# Patient Record
Sex: MALE | Race: BLACK OR AFRICAN AMERICAN | NOT HISPANIC OR LATINO
[De-identification: names, ages, dates, MRNs, and addresses within clinical notes are randomized per-mention and may not be internally consistent; named-entity substitution may affect disease eponyms.]

---

## 2021-04-09 PROBLEM — Z00.00 ENCOUNTER FOR PREVENTIVE HEALTH EXAMINATION: Status: ACTIVE | Noted: 2021-04-09

## 2021-04-12 ENCOUNTER — APPOINTMENT (OUTPATIENT)
Dept: HEMATOLOGY ONCOLOGY | Facility: CLINIC | Age: 66
End: 2021-04-12
Payer: MEDICAID

## 2021-04-12 VITALS
RESPIRATION RATE: 18 BRPM | SYSTOLIC BLOOD PRESSURE: 115 MMHG | WEIGHT: 132 LBS | DIASTOLIC BLOOD PRESSURE: 78 MMHG | HEART RATE: 91 BPM | BODY MASS INDEX: 18.9 KG/M2 | TEMPERATURE: 98.4 F | OXYGEN SATURATION: 99 % | HEIGHT: 70 IN

## 2021-04-12 PROCEDURE — 99205 OFFICE O/P NEW HI 60 MIN: CPT

## 2021-04-12 PROCEDURE — 99072 ADDL SUPL MATRL&STAF TM PHE: CPT

## 2021-04-12 NOTE — ASSESSMENT
[FreeTextEntry1] : atient is 65yr M with known gastric cancer with liver mets, here for further management. Details of therapy are obtained from pt's EMR. \par \par States he was diagnosed in 2020 during a routine endoscopy/colonoscopy. He completed 8 cycles of FLOT chemotherapy 4/16/20=8/12/20  then had total gastrectomy with esophagojejunostomy 9/20 and completed post-op chemo(Xeloda)RT 12/20 at [facility]. \par \par Surveillance CT AP on 2/3/21 showed interval development of left hepatic vein thrombosis as well as a large geographic region of hypodensity throughout the left hepatic lobe measuring 8.4x7.2x7.8 cm . He underwent liver biopsy on 3/23/21, path confirmed moderately differentiated adenocarcinoma, positive for CK7, negative for CK20, CDX-2 , Hepar-1.  His oncologist at [facility] is suggesting he has Immunotherapy. He has sought second opinion from [facility] and is now here for a third opinion.\par \par 2/3/21 CT CAP At [facility]\par 1.Interval development of left hepatic vein thrombosis as well as a large geographic region of hypodensity throughout the left hepatic lobe measuring 8.4x7.2x7.8cm. Finding could represent a perfusional anomaly related to left hepatic vein occlusion. However, a large metastatic lesion with associated left hepatic vein thrombosis is not excluded. Additionally, there is a 1.3am hypodensity in hepatic seg 6/7, which is also new and raises concern for a small metastatic focus. \par 2.Stable postsurgical changes of total gastrectomy and esophagojejunostomy and jejunojejunostomy , jejunostomy tube in place. No evidence of recurrence. No bowel obstruction. \par 3. No pulmonary nodule, focal consolidation, pleural effusion, or pneumothorax. No thoracic lymphadenopathy. \par 4. No abdominopelvic lymphadenopathy. \par \par 2/24/21 MRI Abdomen\par 1.Gastric adenocarcinoma s/p total gastrectomy with postsurgical appearance. No suspicious nodularity/enhancement at the surgical sites to suggest local recurrence. \par 2. Since the prior to CT dated 11/3/20 there has been interval increased atrophy of the left lateral segment, with  lack of uptake notes on hepatobiliary phase imaging  in a geographic distribution, suggestive of post radiation treatment changes. There is associated diminished caliber of the left portal vein and occluded left hepatic vein. Findings are most likely related to post radiation treatment changes of the left lateral segment. Infiltrative metastasis is considered much less likely. Recommend a 3 months f/u\par 3.Interval development of a new discrete 2 cm segment 6/7 hepatic lesion since the prior to CT AP dated 11/3/20 suspicious for viable hepatic metastasis. Additional indeterminate 0.3cm segment 5 hepatic lesion for which hepatic metastasis also remains on the differential diagnosis. \par \par Plan\par # Gastric cancer with liver mets, s/p C8 FLOT, Xeloda/RT\par -Obtain oncology notes\par -Obtain PDL-1, NGS reports\par - SW f/u\par -Further recommendations pending these results\par -Nutrition referral\par \par RTC next week

## 2021-04-12 NOTE — HISTORY OF PRESENT ILLNESS
[de-identified] : Patient is 65yr M with known gastric cancer with liver mets, here for an opinion. Details of therapy are obtained from pt's EMR. \par \par States he was diagnosed in 2020 during a routine endoscopy/colonoscopy. He completed 8 cycles of FLOT chemotherapy 4/16/20-8/12/20  then had total gastrectomy with esophagojejunostomy 9/20 and completed post-op chemo(Xeloda)RT 12/20 at [facility]. Surveillance CT AP on 2/3/21 showed interval development of left hepatic vein thrombosis as well as a large geographic region of hypodensity throughout the left hepatic lobe measuring 8.4x7.2x7.8 cm . He underwent liver biopsy on 3/23/21, path confirmed moderately differentiated adenocarcinoma, positive for CK7, negative for CK20, CDX-2 , Hepar-1.  His oncologist at [facility] is suggesting he has Immunotherapy. He has sought second opinion from [facility] and is now here for a third opinion. He is reluctant to offer in depth information regarding Phx.\par \par 2/3/21 CT CAP At [facility]\par 1.Interval development of left hepatic vein thrombosis as well as a large geographic region of hypodensity throughout the left hepatic lobe measuring 8.4x7.2x7.8cm. Finding could represent a perfusional anomaly related to left hepatic vein occlusion. However, a large metastatic lesion with associated left hepatic vein thrombosis is not excluded. Additionally, there is a 1.3am hypodensity in hepatic seg 6/7, which is also new and raises concern for a small metastatic focus. \par 2.Stable postsurgical changes of total gastrectomy and esophagojejunostomy and jejunojejunostomy , jejunostomy tube in place. No evidence of recurrence. No bowel obstruction. \par 3. No pulmonary nodule, focal consolidation, pleural effusion, or pneumothorax. No thoracic lymphadenopathy. \par 4. No abdominopelvic lymphadenopathy. \par \par 2/24/21 MRI Abdomen\par 1.Gastric adenocarcinoma s/p total gastrectomy with postsurgical appearance. No suspicious nodularity/enhancement at the surgical sites to suggest local recurrence. \par 2. Since the prior to CT dated 11/3/20 there has been interval increased atrophy of the left lateral segment, with  lack of uptake notes on hepatobiliary phase imaging  in a geographic distribution, suggestive of post radiation treatment changes. There is associated diminished caliber of the left portal vein and occluded left hepatic vein. Findings are most likely related to post radiation treatment changes of the left lateral segment. Infiltrative metastasis is considered much less likely. Recommend a 3 months f/u\par 3.Interval development of a new discrete 2 cm segment 6/7 hepatic lesion since the prior to CT AP dated 11/3/20 suspicious for viable hepatic metastasis. Additional indeterminate 0.3cm segment 5 hepatic lesion for which hepatic metastasis also remains on the differential diagnosis.  [de-identified] : Patient notes numbness/tingling to fingers and recent weight loss, denies all other symptoms at this time.

## 2021-04-12 NOTE — REVIEW OF SYSTEMS
[Recent Change In Weight] : ~T recent weight change [Fever] : no fever [Eye Pain] : no eye pain [Dysphagia] : no dysphagia [Chest Pain] : no chest pain [Lower Ext Edema] : no lower extremity edema [Shortness Of Breath] : no shortness of breath [Wheezing] : no wheezing [Cough] : no cough [Abdominal Pain] : no abdominal pain [Vomiting] : no vomiting [Constipation] : no constipation [Diarrhea] : no diarrhea [FreeTextEntry2] : when asked how much weight loss, pt states "a lot" [de-identified] : tingling to fingers

## 2021-04-12 NOTE — PHYSICAL EXAM
[Restricted in physically strenuous activity but ambulatory and able to carry out work of a light or sedentary nature] : Status 1- Restricted in physically strenuous activity but ambulatory and able to carry out work of a light or sedentary nature, e.g., light house work, office work [Normal] : affect appropriate [de-identified] : g- tube in place

## 2021-04-12 NOTE — REASON FOR VISIT
[Initial Consultation] : an initial consultation [FreeTextEntry1] : [id number] [FreeTextEntry2] : Ariela [TWNoteComboBox1] : Uz[place]

## 2021-04-20 ENCOUNTER — APPOINTMENT (OUTPATIENT)
Dept: HEMATOLOGY ONCOLOGY | Facility: CLINIC | Age: 66
End: 2021-04-20
Payer: MEDICAID

## 2021-04-21 ENCOUNTER — APPOINTMENT (OUTPATIENT)
Dept: HEMATOLOGY ONCOLOGY | Facility: CLINIC | Age: 66
End: 2021-04-21
Payer: MEDICAID

## 2021-04-21 VITALS
HEART RATE: 119 BPM | BODY MASS INDEX: 18.9 KG/M2 | SYSTOLIC BLOOD PRESSURE: 93 MMHG | WEIGHT: 132 LBS | TEMPERATURE: 99.5 F | OXYGEN SATURATION: 99 % | DIASTOLIC BLOOD PRESSURE: 67 MMHG | RESPIRATION RATE: 18 BRPM | HEIGHT: 70 IN

## 2021-04-21 DIAGNOSIS — G47.00 INSOMNIA, UNSPECIFIED: ICD-10-CM

## 2021-04-21 PROCEDURE — 99214 OFFICE O/P EST MOD 30 MIN: CPT

## 2021-04-21 PROCEDURE — 99072 ADDL SUPL MATRL&STAF TM PHE: CPT

## 2021-04-28 ENCOUNTER — NON-APPOINTMENT (OUTPATIENT)
Age: 66
End: 2021-04-28

## 2021-05-04 ENCOUNTER — APPOINTMENT (OUTPATIENT)
Dept: HEMATOLOGY ONCOLOGY | Facility: CLINIC | Age: 66
End: 2021-05-04
Payer: MEDICAID

## 2021-05-04 VITALS
WEIGHT: 130 LBS | HEIGHT: 70 IN | BODY MASS INDEX: 18.61 KG/M2 | TEMPERATURE: 98.2 F | HEART RATE: 69 BPM | DIASTOLIC BLOOD PRESSURE: 67 MMHG | RESPIRATION RATE: 18 BRPM | SYSTOLIC BLOOD PRESSURE: 105 MMHG | OXYGEN SATURATION: 100 %

## 2021-05-04 DIAGNOSIS — Z01.812 ENCOUNTER FOR PREPROCEDURAL LABORATORY EXAMINATION: ICD-10-CM

## 2021-05-04 PROCEDURE — 99214 OFFICE O/P EST MOD 30 MIN: CPT

## 2021-05-04 PROCEDURE — 99072 ADDL SUPL MATRL&STAF TM PHE: CPT

## 2021-05-04 RX ORDER — GABAPENTIN 100 MG/1
CAPSULE ORAL
Refills: 0 | Status: COMPLETED | COMMUNITY
End: 2021-05-04

## 2021-05-04 NOTE — HISTORY OF PRESENT ILLNESS
[de-identified] : Mr. Murry is 65yr M with Stage III(ypT4b,pN2,cMo AJCC 8th ed) poorly  differentiated gastric cancer of the body and antrum with  MSI-H, Her2 -ve, KRAS WT, PD- L1 expression with CPS 5 , s/p 8 cycles of FLOT (4/16/20-8/12/20), Total gastrectomy with esophagojejunostomy with enblock resection of segment 2/3, periportal and D2 lymphadenectomy, feeding jejunostomy, complete omentectomy,oversewing of duodenum (9/14/20), (Xeloda)RT(12/14/20). Now with recurrent dz,isolated biopsy proven liver met segment 6. Started C1 Pembrolizumab with Dr. Mendoza at [place] on 4/20/21. Today here for f/u after Pembrolizumab and wants to transfer his care to us. \par \par He was diagnosed with  Stage III(ypT4b,pN2,cMo) poorly  differentiated gastric cancer of the body and antrum, Her2 negative. He completed 8 cycles of FLOT chemotherapy 4/16/20-8/12/20  then had total gastrectomy with esophagojejunostomy with enblock resection of segment 2/3, periportal and D2 lymphadenectomy, feeding jejunostomy, complete omentectomy,oversewing of duodenum on 9/14/20 and completed post-op chemo(Xeloda)RT 12/14/20 at [facility]. Surveillance CT AP on 2/3/21 showed interval development of left hepatic vein thrombosis as well as a large geographic region of hypodensity throughout the left hepatic lobe measuring 8.4x7.2x7.8 cm . He underwent liver biopsy on 3/23/21, path confirmed moderately differentiated adenocarcinoma, positive for CK7, negative for CK20, CDX-2 , Hepar-1.  His oncologist at [facility] is suggesting he has Immunotherapy. He has sought second opinion from [facility] and is now here for a third opinion. He is reluctant to offer in depth information regarding Phx.\par \par 9/14/20\par surgical pathology report\par A. Omentum, Omentectomy: Benign omental fibroadipose tissue with chronic inflammation and reactive mesothelium\par B Lymph node, common bile duct , excision: One benign lymph node(0/1)\par C. Lymph node, Hepatic artery , excision: Metastatic adenocarcinoma involving three of three lymph node(3/3)\par D. Lymph node, portal vein excision: One benign lymph node(0/1)\par E. Stomach and Liber, Total gastrectomy and partial hepatectomy \par - Invasive poorly differentiated adenocarcinoma, measuring 8.0cm, predominantly involving gastric body and antrum\par -Tumor invades through entire thickness of gastric wall and into liver parenchyma. \par -All surgical margins( proximal ,distal, radial and liver resection margins) are free of tumor.\par - Focal lymphovascular space invasion present \par - No peripheral invasion seen\par -Metastatic adenocarcinoma involving one of twenty five lymph nodes(1/25)\par -Metastatic focus in lymph node measures 2.2cm\par -Background stomach shows intestinal metaplasia\par -AJCC 8th ed pathologic tumor stage: [id number]\par F.Lymph node, left gastric , excision:  One benign lymph node(0/1)\par G .Lymph node, Infrapancreatic, excision: Metastatic adenocarcinoma involving one of one lymph node(1/1) \par \par 2/3/21 CT CAP At [facility]\par 1.Interval development of left hepatic vein thrombosis as well as a large geographic region of hypodensity throughout the left hepatic lobe measuring 8.4x7.2x7.8cm. Finding could represent a perfusional anomaly related to left hepatic vein occlusion. However, a large metastatic lesion with associated left hepatic vein thrombosis is not excluded. Additionally, there is a 1.3am hypodensity in hepatic seg 6/7, which is also new and raises concern for a small metastatic focus. \par 2.Stable postsurgical changes of total gastrectomy and esophagojejunostomy and jejunojejunostomy , jejunostomy tube in place. No evidence of recurrence. No bowel obstruction. \par 3. No pulmonary nodule, focal consolidation, pleural effusion, or pneumothorax. No thoracic lymphadenopathy. \par 4. No abdominopelvic lymphadenopathy. \par \par 2/24/21 MRI Abdomen\par 1.Gastric adenocarcinoma s/p total gastrectomy with postsurgical appearance. No suspicious nodularity/enhancement at the surgical sites to suggest local recurrence. \par 2. Since the prior to CT dated 11/3/20 there has been interval increased atrophy of the left lateral segment, with  lack of uptake notes on hepatobiliary phase imaging  in a geographic distribution, suggestive of post radiation treatment changes. There is associated diminished caliber of the left portal vein and occluded left hepatic vein. Findings are most likely related to post radiation treatment changes of the left lateral segment. Infiltrative metastasis is considered much less likely. Recommend a 3 months f/u\par 3.Interval development of a new discrete 2 cm segment 6/7 hepatic lesion since the prior to CT AP dated 11/3/20 suspicious for viable hepatic metastasis. Additional indeterminate 0.3cm segment 5 hepatic lesion for which hepatic metastasis also remains on the differential diagnosis. \par \par 3/23/21\par CT guided liver biopsy\par -Adenocarcinoma, moderately-poorly differentiated \par positive for CK7, negative for CK20, CDX-2 Hepar-1\par This pattern of staining supports the diagnosis, but is not specific in regards to site of origin. Given  patient's history of gastric cancer, this morphology and immunophenotype would be compatible with a metastasis from that site.   [de-identified] : Patient returns for follow-up visit, and to further establish care, he is set to resume chemo treatments with us here next week. He notes constipation and fatigue, as well as continued neuropathy of the hands/feet from prior chemo treatments. He continues taking Gabapentin, he can not remember the dosage amount. PEG tube is in place, patient has been using QHS with Vital (3-4 cans via kangaroo pump x 8hr), ordered by [facility].

## 2021-05-04 NOTE — PHYSICAL EXAM
[Restricted in physically strenuous activity but ambulatory and able to carry out work of a light or sedentary nature] : Status 1- Restricted in physically strenuous activity but ambulatory and able to carry out work of a light or sedentary nature, e.g., light house work, office work [Normal] : affect appropriate [Thin] : thin [de-identified] : g- tube in place

## 2021-05-04 NOTE — ASSESSMENT
[FreeTextEntry1] : Mr. Murry is 65yr M with Stage III(ypT4b,pN2,cMo AJCC 8th ed) poorly  differentiated gastric cancer of the body and antrum with MSI-H, Her2 -ve, KRAS WT, PD- L1 expression with CPS 5, s/p 8 cycles of FLOT (4/16/20-8/12/20), Total gastrectomy with esophagojejunostomy with enblock resection of segment 2/3, periportal and D2 lymphadenectomy, feeding jejunostomy, complete omentectomy,oversewing of duodenum (9/14/20), (Xeloda)RT(12/14/20). Now with recurrent dz,isolated biopsy proven liver met segment 6. Started C1 Pembrolizumab with Dr. Mendoza at [place] on 4/20/21. Today here for f/u after Pembrolizumab and wants to transfer his care to us. . \par \par 9/14/20\par surgical pathology report\par A. Omentum, Omentectomy: Benign omental fibroadipose tissue with chronic inflammation and reactive mesothelium\par B Lymph node, common bile duct , excision: One benign lymph node(0/1)\par C. Lymph node, Hepatic artery , excision: Metastatic adenocarcinoma involving three of three lymph node(3/3)\par D. Lymph node, portal vein excision: One benign lymph node(0/1)\par E. Stomach and Liber, Total gastrectomy and partial hepatectomy \par - Invasive poorly differentiated adenocarcinoma, measuring 8.0cm, predominantly involving gastric body and antrum\par -Tumor invades through entire thickness of gastric wall and into liver parenchyma. \par -All surgical margins( proximal ,distal, radial and liver resection margins) are free of tumor.\par - Focal lymphovascular space invasion present \par - No peripheral invasion seen\par -Metastatic adenocarcinoma involving one of twenty five lymph nodes(1/25)\par -Metastatic focus in lymph node measures 2.2cm\par -Background stomach shows intestinal metaplasia\par -AJCC 8th ed pathologic tumor stage: [id number]\par F.Lymph node, left gastric , excision:  One benign lymph node(0/1)\par G .Lymph node, Infrapancreatic, excision: Metastatic adenocarcinoma involving one of one lymph node(1/1) \par \par 2/3/21 CT CAP At [facility]\par 1.Interval development of left hepatic vein thrombosis as well as a large geographic region of hypodensity throughout the left hepatic lobe measuring 8.4x7.2x7.8cm. Finding could represent a perfusional anomaly related to left hepatic vein occlusion. However, a large metastatic lesion with associated left hepatic vein thrombosis is not excluded. Additionally, there is a 1.3am hypodensity in hepatic seg 6/7, which is also new and raises concern for a small metastatic focus. \par 2.Stable postsurgical changes of total gastrectomy and esophagojejunostomy and jejunojejunostomy , jejunostomy tube in place. No evidence of recurrence. No bowel obstruction. \par 3. No pulmonary nodule, focal consolidation, pleural effusion, or pneumothorax. No thoracic lymphadenopathy. \par 4. No abdominopelvic lymphadenopathy. \par \par 2/24/21 MRI Abdomen\par 1.Gastric adenocarcinoma s/p total gastrectomy with postsurgical appearance. No suspicious nodularity/enhancement at the surgical sites to suggest local recurrence. \par 2. Since the prior to CT dated 11/3/20 there has been interval increased atrophy of the left lateral segment, with  lack of uptake notes on hepatobiliary phase imaging  in a geographic distribution, suggestive of post radiation treatment changes. There is associated diminished caliber of the left portal vein and occluded left hepatic vein. Findings are most likely related to post radiation treatment changes of the left lateral segment. Infiltrative metastasis is considered much less likely. Recommend a 3 months f/u\par 3.Interval development of a new discrete 2 cm segment 6/7 hepatic lesion since the prior to CT AP dated 11/3/20 suspicious for viable hepatic metastasis. Additional indeterminate 0.3cm segment 5 hepatic lesion for which hepatic metastasis also remains on the differential diagnosis. \par \par 3/23/21\par CT guided liver biopsy\par -Adenocarcinoma, moderately-poorly differentiated \par positive for CK7, negative for CK20, CDX-2 Hepar-1\par This pattern of staining supports the diagnosis, but is not specific in regards to site of origin. Given  patient's history of gastric cancer, this morphology and immunophenotype would be compatible with a metastasis from that site.  \par \par Plan\par # Stage III(ypT4b,pN2,cMo AJCC 8th ed) MSI-H, Her2 -ve, KRAS-ve, PD- L1 expression with CPS 5  Gastric cancer, now with recurrent dz with liver mets, s/p C8 FLOT, Total gastrectomy with esophagojejunostomy, Xeloda/RT\par -MSI -H Her2 -ve, KRAS-ve, PD- L1 expression with CPS 5 \par -Started C1 Pembrolizumab with Dr. Mendoza at [place] on 4/20/21. He wants to start Pembro with us on 5/11/21. He signed consent. \par - SW f/u\par - Will repeat CT CAP prior to next treatment to serve as baseline\par \par # Weight loss/ Malnutrition/ PEG tube feeding\par -PEG tube feeding QHS with Vital (3-4 cans via kangaroo pump x 8hr), ordered by [facility]. \par -Nutrition referral\par \par # Insomnia\par -Rx for Xanax at bedtime as prn\par \par # Peripheral neuropathy \par - Monitor now\par \par RTC in  2 weeks

## 2021-05-04 NOTE — REVIEW OF SYSTEMS
[Fatigue] : fatigue [Recent Change In Weight] : ~T recent weight change [Muscle Weakness] : muscle weakness [Fever] : no fever [Chills] : no chills [Night Sweats] : no night sweats [Eye Pain] : no eye pain [Dysphagia] : no dysphagia [Odynophagia] : no odynophagia [Chest Pain] : no chest pain [Lower Ext Edema] : no lower extremity edema [Shortness Of Breath] : no shortness of breath [Wheezing] : no wheezing [Cough] : no cough [Abdominal Pain] : no abdominal pain [Vomiting] : no vomiting [Constipation] : no constipation [Diarrhea] : no diarrhea [Dysuria] : no dysuria [FreeTextEntry2] : when asked how much weight loss, pt states "a lot" [de-identified] : tingling to fingers

## 2021-05-05 LAB
25(OH)D3 SERPL-MCNC: 38.2 NG/ML
ALBUMIN SERPL ELPH-MCNC: 4.3 G/DL
ALP BLD-CCNC: 187 U/L
ALT SERPL-CCNC: 32 U/L
ANION GAP SERPL CALC-SCNC: 14 MMOL/L
AST SERPL-CCNC: 30 U/L
BASOPHILS # BLD AUTO: 0.04 K/UL
BASOPHILS NFR BLD AUTO: 1 %
BILIRUB SERPL-MCNC: 0.2 MG/DL
BUN SERPL-MCNC: 14 MG/DL
CALCIUM SERPL-MCNC: 9.3 MG/DL
CANCER AG19-9 SERPL-ACNC: <2 U/ML
CEA SERPL-MCNC: 253 NG/ML
CHLORIDE SERPL-SCNC: 104 MMOL/L
CO2 SERPL-SCNC: 25 MMOL/L
CREAT SERPL-MCNC: 0.67 MG/DL
EOSINOPHIL # BLD AUTO: 0.05 K/UL
EOSINOPHIL NFR BLD AUTO: 1.3 %
GLUCOSE SERPL-MCNC: 81 MG/DL
HCT VFR BLD CALC: 37.1 %
HGB BLD-MCNC: 11.5 G/DL
IMM GRANULOCYTES NFR BLD AUTO: 0.3 %
IRON SATN MFR SERPL: 13 %
IRON SERPL-MCNC: 53 UG/DL
LYMPHOCYTES # BLD AUTO: 0.98 K/UL
LYMPHOCYTES NFR BLD AUTO: 25.4 %
MAN DIFF?: NORMAL
MCHC RBC-ENTMCNC: 27.1 PG
MCHC RBC-ENTMCNC: 31 GM/DL
MCV RBC AUTO: 87.3 FL
MONOCYTES # BLD AUTO: 0.58 K/UL
MONOCYTES NFR BLD AUTO: 15 %
NEUTROPHILS # BLD AUTO: 2.2 K/UL
NEUTROPHILS NFR BLD AUTO: 57 %
PLATELET # BLD AUTO: 260 K/UL
POTASSIUM SERPL-SCNC: 5 MMOL/L
PROT SERPL-MCNC: 7.2 G/DL
RBC # BLD: 4.25 M/UL
RBC # FLD: 16.2 %
SARS-COV-2 N GENE NPH QL NAA+PROBE: NOT DETECTED
SODIUM SERPL-SCNC: 142 MMOL/L
TIBC SERPL-MCNC: 410 UG/DL
UIBC SERPL-MCNC: 357 UG/DL
VIT B12 SERPL-MCNC: >2000 PG/ML
WBC # FLD AUTO: 3.86 K/UL

## 2021-05-06 ENCOUNTER — NON-APPOINTMENT (OUTPATIENT)
Age: 66
End: 2021-05-06

## 2021-05-06 LAB
T4 SERPL-MCNC: 7.6 UG/DL
TSH SERPL-ACNC: 1.19 UIU/ML

## 2021-05-08 ENCOUNTER — OUTPATIENT (OUTPATIENT)
Dept: OUTPATIENT SERVICES | Facility: HOSPITAL | Age: 66
LOS: 1 days | End: 2021-05-08

## 2021-05-08 ENCOUNTER — APPOINTMENT (OUTPATIENT)
Dept: CT IMAGING | Facility: CLINIC | Age: 66
End: 2021-05-08

## 2021-05-11 ENCOUNTER — APPOINTMENT (OUTPATIENT)
Age: 66
End: 2021-05-11

## 2021-05-19 ENCOUNTER — LABORATORY RESULT (OUTPATIENT)
Age: 66
End: 2021-05-19

## 2021-05-19 ENCOUNTER — APPOINTMENT (OUTPATIENT)
Dept: HEMATOLOGY ONCOLOGY | Facility: CLINIC | Age: 66
End: 2021-05-19
Payer: MEDICAID

## 2021-05-19 VITALS
HEART RATE: 98 BPM | DIASTOLIC BLOOD PRESSURE: 71 MMHG | BODY MASS INDEX: 18.9 KG/M2 | WEIGHT: 132 LBS | SYSTOLIC BLOOD PRESSURE: 117 MMHG | HEIGHT: 70 IN | TEMPERATURE: 98 F | OXYGEN SATURATION: 99 % | RESPIRATION RATE: 17 BRPM

## 2021-05-19 PROCEDURE — 99214 OFFICE O/P EST MOD 30 MIN: CPT

## 2021-05-19 RX ORDER — CHLORHEXIDINE GLUCONATE 4 %
325 (65 FE) LIQUID (ML) TOPICAL DAILY
Qty: 30 | Refills: 0 | Status: DISCONTINUED | COMMUNITY
Start: 2021-05-19 | End: 2021-05-19

## 2021-05-21 LAB
ALBUMIN SERPL ELPH-MCNC: 3.4 G/DL
ALP BLD-CCNC: 115 U/L
ALT SERPL-CCNC: 22 U/L
ANION GAP SERPL CALC-SCNC: 7 MMOL/L
AST SERPL-CCNC: 32 U/L
BILIRUB SERPL-MCNC: 0.8 MG/DL
BUN SERPL-MCNC: 15 MG/DL
CALCIUM SERPL-MCNC: 9.5 MG/DL
CANCER AG19-9 SERPL-ACNC: <2 U/ML
CEA SERPL-MCNC: 114 NG/ML
CHLORIDE SERPL-SCNC: 106 MMOL/L
CO2 SERPL-SCNC: 30 MMOL/L
CREAT SERPL-MCNC: 0.7 MG/DL
FERRITIN SERPL-MCNC: 27 NG/ML
GLUCOSE SERPL-MCNC: 92 MG/DL
POTASSIUM SERPL-SCNC: 5 MMOL/L
PROT SERPL-MCNC: 6.7 G/DL
SODIUM SERPL-SCNC: 143 MMOL/L

## 2021-05-21 NOTE — HISTORY OF PRESENT ILLNESS
[de-identified] : Mr. Murry is 65yr M with Stage III(ypT4b,pN2,cMo AJCC 8th ed) poorly  differentiated gastric cancer of the body and antrum with  MSI-H, Her2 -ve, KRAS WT, PD- L1 expression with CPS 5 , s/p 8 cycles of FLOT (4/16/20-8/12/20), Total gastrectomy with esophagojejunostomy with enblock resection of segment 2/3, periportal and D2 lymphadenectomy, feeding jejunostomy, complete omentectomy,oversewing of duodenum (9/14/20), (Xeloda)RT(12/14/20). Now with recurrent dz,isolated biopsy proven liver met segment 6. Started C1 Pembrolizumab with Dr. Mendoza at [place] on 4/20/21. Today here for f/u after Pembrolizumab 5/11/21  and wants to transfer his care to us. \par \par He was diagnosed with  Stage III(ypT4b,pN2,cMo) poorly  differentiated gastric cancer of the body and antrum, Her2 negative. He completed 8 cycles of FLOT chemotherapy 4/16/20-8/12/20  then had total gastrectomy with esophagojejunostomy with enblock resection of segment 2/3, periportal and D2 lymphadenectomy, feeding jejunostomy, complete omentectomy,oversewing of duodenum on 9/14/20 and completed post-op chemo(Xeloda)RT 12/14/20 at [facility]. Surveillance CT AP on 2/3/21 showed interval development of left hepatic vein thrombosis as well as a large geographic region of hypodensity throughout the left hepatic lobe measuring 8.4x7.2x7.8 cm . He underwent liver biopsy on 3/23/21, path confirmed moderately differentiated adenocarcinoma, positive for CK7, negative for CK20, CDX-2 , Hepar-1.  His oncologist at [facility] is suggesting he has Immunotherapy. He has sought second opinion from [facility] and is now here for a third opinion. He is reluctant to offer in depth information regarding Phx.\par \par 9/14/20\par surgical pathology report\par A. Omentum, Omentectomy: Benign omental fibroadipose tissue with chronic inflammation and reactive mesothelium\par B Lymph node, common bile duct , excision: One benign lymph node(0/1)\par C. Lymph node, Hepatic artery , excision: Metastatic adenocarcinoma involving three of three lymph node(3/3)\par D. Lymph node, portal vein excision: One benign lymph node(0/1)\par E. Stomach and Liber, Total gastrectomy and partial hepatectomy \par - Invasive poorly differentiated adenocarcinoma, measuring 8.0cm, predominantly involving gastric body and antrum\par -Tumor invades through entire thickness of gastric wall and into liver parenchyma. \par -All surgical margins( proximal ,distal, radial and liver resection margins) are free of tumor.\par - Focal lymphovascular space invasion present \par - No peripheral invasion seen\par -Metastatic adenocarcinoma involving one of twenty five lymph nodes(1/25)\par -Metastatic focus in lymph node measures 2.2cm\par -Background stomach shows intestinal metaplasia\par -AJCC 8th ed pathologic tumor stage: [id number]\par F.Lymph node, left gastric , excision:  One benign lymph node(0/1)\par G .Lymph node, Infrapancreatic, excision: Metastatic adenocarcinoma involving one of one lymph node(1/1) \par \par 2/3/21 CT CAP At [facility]\par 1.Interval development of left hepatic vein thrombosis as well as a large geographic region of hypodensity throughout the left hepatic lobe measuring 8.4x7.2x7.8cm. Finding could represent a perfusional anomaly related to left hepatic vein occlusion. However, a large metastatic lesion with associated left hepatic vein thrombosis is not excluded. Additionally, there is a 1.3am hypodensity in hepatic seg 6/7, which is also new and raises concern for a small metastatic focus. \par 2.Stable postsurgical changes of total gastrectomy and esophagojejunostomy and jejunojejunostomy , jejunostomy tube in place. No evidence of recurrence. No bowel obstruction. \par 3. No pulmonary nodule, focal consolidation, pleural effusion, or pneumothorax. No thoracic lymphadenopathy. \par 4. No abdominopelvic lymphadenopathy. \par \par 2/24/21 MRI Abdomen\par 1.Gastric adenocarcinoma s/p total gastrectomy with postsurgical appearance. No suspicious nodularity/enhancement at the surgical sites to suggest local recurrence. \par 2. Since the prior to CT dated 11/3/20 there has been interval increased atrophy of the left lateral segment, with  lack of uptake notes on hepatobiliary phase imaging  in a geographic distribution, suggestive of post radiation treatment changes. There is associated diminished caliber of the left portal vein and occluded left hepatic vein. Findings are most likely related to post radiation treatment changes of the left lateral segment. Infiltrative metastasis is considered much less likely. Recommend a 3 months f/u\par 3.Interval development of a new discrete 2 cm segment 6/7 hepatic lesion since the prior to CT AP dated 11/3/20 suspicious for viable hepatic metastasis. Additional indeterminate 0.3cm segment 5 hepatic lesion for which hepatic metastasis also remains on the differential diagnosis. \par \par 3/23/21\par CT guided liver biopsy\par -Adenocarcinoma, moderately-poorly differentiated \par positive for CK7, negative for CK20, CDX-2 Hepar-1\par This pattern of staining supports the diagnosis, but is not specific in regards to site of origin. Given  patient's history of gastric cancer, this morphology and immunophenotype would be compatible with a metastasis from that site.   [de-identified] : Patient returns for follow-up visit. He received C2 pembro at [place] on 5/11/21. Reports persistent peripheral neuropathy, constipation, increased fatigue.  He continues taking Gabapentin solution twice a day as needed.  PEG tube is in place, now using Vital  3-4 times a day  (3-4 cans via kangaroo pump x 8hr), ordered by [facility]. He completed nutrition consult. Denies fever, SOB, chest pain, or N/V/D

## 2021-05-21 NOTE — REVIEW OF SYSTEMS
[Fatigue] : fatigue [Muscle Weakness] : muscle weakness [Constipation] : constipation [Fever] : no fever [Chills] : no chills [Night Sweats] : no night sweats [Recent Change In Weight] : ~T no recent weight change [Eye Pain] : no eye pain [Dysphagia] : no dysphagia [Odynophagia] : no odynophagia [Chest Pain] : no chest pain [Lower Ext Edema] : no lower extremity edema [Shortness Of Breath] : no shortness of breath [Wheezing] : no wheezing [Cough] : no cough [Abdominal Pain] : no abdominal pain [Vomiting] : no vomiting [Diarrhea] : no diarrhea [Dysuria] : no dysuria [de-identified] : tingling to fingers

## 2021-05-21 NOTE — ASSESSMENT
[FreeTextEntry1] : Mr. Murry is 65yr M with Stage III(ypT4b,pN2,cMo AJCC 8th ed) poorly  differentiated gastric cancer of the body and antrum with MSI-H, Her2 -ve, KRAS WT, PD- L1 expression with CPS 5, s/p 8 cycles of FLOT (4/16/20-8/12/20), Total gastrectomy with esophagojejunostomy with enblock resection of segment 2/3, periportal and D2 lymphadenectomy, feeding jejunostomy, complete omentectomy,oversewing of duodenum (9/14/20), (Xeloda)RT(12/14/20). Now with recurrent dz,isolated biopsy proven liver met segment 6. Started C1 Pembrolizumab with Dr. Mendoza at [place] on 4/20/21. Today here for f/u after C2 Pembrolizumab on 5/11. \par \par 9/14/20\par surgical pathology report\par A. Omentum, Omentectomy: Benign omental fibroadipose tissue with chronic inflammation and reactive mesothelium\par B Lymph node, common bile duct , excision: One benign lymph node(0/1)\par C. Lymph node, Hepatic artery , excision: Metastatic adenocarcinoma involving three of three lymph node(3/3)\par D. Lymph node, portal vein excision: One benign lymph node(0/1)\par E. Stomach and Liber, Total gastrectomy and partial hepatectomy \par - Invasive poorly differentiated adenocarcinoma, measuring 8.0cm, predominantly involving gastric body and antrum\par -Tumor invades through entire thickness of gastric wall and into liver parenchyma. \par -All surgical margins( proximal ,distal, radial and liver resection margins) are free of tumor.\par - Focal lymphovascular space invasion present \par - No peripheral invasion seen\par -Metastatic adenocarcinoma involving one of twenty five lymph nodes(1/25)\par -Metastatic focus in lymph node measures 2.2cm\par -Background stomach shows intestinal metaplasia\par -AJCC 8th ed pathologic tumor stage: [id number]\par F.Lymph node, left gastric , excision:  One benign lymph node(0/1)\par G .Lymph node, Infrapancreatic, excision: Metastatic adenocarcinoma involving one of one lymph node(1/1) \par \par 2/3/21 CT CAP At [facility]\par 1.Interval development of left hepatic vein thrombosis as well as a large geographic region of hypodensity throughout the left hepatic lobe measuring 8.4x7.2x7.8cm. Finding could represent a perfusional anomaly related to left hepatic vein occlusion. However, a large metastatic lesion with associated left hepatic vein thrombosis is not excluded. Additionally, there is a 1.3am hypodensity in hepatic seg 6/7, which is also new and raises concern for a small metastatic focus. \par 2.Stable postsurgical changes of total gastrectomy and esophagojejunostomy and jejunojejunostomy , jejunostomy tube in place. No evidence of recurrence. No bowel obstruction. \par 3. No pulmonary nodule, focal consolidation, pleural effusion, or pneumothorax. No thoracic lymphadenopathy. \par 4. No abdominopelvic lymphadenopathy. \par \par 2/24/21 MRI Abdomen\par 1.Gastric adenocarcinoma s/p total gastrectomy with postsurgical appearance. No suspicious nodularity/enhancement at the surgical sites to suggest local recurrence. \par 2. Since the prior to CT dated 11/3/20 there has been interval increased atrophy of the left lateral segment, with  lack of uptake notes on hepatobiliary phase imaging  in a geographic distribution, suggestive of post radiation treatment changes. There is associated diminished caliber of the left portal vein and occluded left hepatic vein. Findings are most likely related to post radiation treatment changes of the left lateral segment. Infiltrative metastasis is considered much less likely. Recommend a 3 months f/u\par 3.Interval development of a new discrete 2 cm segment 6/7 hepatic lesion since the prior to CT AP dated 11/3/20 suspicious for viable hepatic metastasis. Additional indeterminate 0.3cm segment 5 hepatic lesion for which hepatic metastasis also remains on the differential diagnosis. \par \par 3/23/21\par CT guided liver biopsy\par -Adenocarcinoma, moderately-poorly differentiated \par positive for CK7, negative for CK20, CDX-2 Hepar-1\par This pattern of staining supports the diagnosis, but is not specific in regards to site of origin. Given  patient's history of gastric cancer, this morphology and immunophenotype would be compatible with a metastasis from that site.  \par \par Plan\par # Stage III(ypT4b,pN2,cMo AJCC 8th ed) MSI-H, Her2 -ve, KRAS-ve, PD- L1 expression with CPS 5  Gastric cancer, now with recurrent dz with liver mets, s/p C8 FLOT, Total gastrectomy with esophagojejunostomy, Xeloda/RT\par -MSI -H Her2 -ve, KRAS-ve, PD- L1 expression with CPS 5 \par -Started C1 Pembrolizumab with Dr. Mendoza at [place] on 4/20/21. Now S/P C2 on 5/11/21. He will complete C4 of pembrolizumab at [place] and then repeat CT scan and transfer his care to us. \par - SW f/u\par - Will repeat CBC, CMP, CEA today last CEA  253 on 5/5/21.  \par \par # Weight loss/ Malnutrition/ PEG tube feeding\par -PEG tube feeding with Vital 3-4 times a day (3-4 cans via kangaroo pump x 8hr), ordered by [facility]. \par -Nutrition referral completed. \par \par # Insomnia\par -Rx for Xanax at bedtime as prn\par \par # Peripheral neuropathy \par - Monitor now\par - Continue on Gabapentin as needed\par - Referral to Dr. Valdovinos for further management\par \par RTC in  3 weeks

## 2021-05-21 NOTE — PHYSICAL EXAM
[Restricted in physically strenuous activity but ambulatory and able to carry out work of a light or sedentary nature] : Status 1- Restricted in physically strenuous activity but ambulatory and able to carry out work of a light or sedentary nature, e.g., light house work, office work [Thin] : thin [Normal] : affect appropriate [de-identified] : g- tube in place

## 2021-05-26 ENCOUNTER — APPOINTMENT (OUTPATIENT)
Age: 66
End: 2021-05-26

## 2021-05-26 ENCOUNTER — OUTPATIENT (OUTPATIENT)
Dept: OUTPATIENT SERVICES | Facility: HOSPITAL | Age: 66
LOS: 1 days | End: 2021-05-26
Payer: MEDICAID

## 2021-05-26 VITALS
TEMPERATURE: 98 F | DIASTOLIC BLOOD PRESSURE: 74 MMHG | HEART RATE: 74 BPM | SYSTOLIC BLOOD PRESSURE: 126 MMHG | OXYGEN SATURATION: 98 % | RESPIRATION RATE: 16 BRPM

## 2021-05-26 DIAGNOSIS — C16.9 MALIGNANT NEOPLASM OF STOMACH, UNSPECIFIED: ICD-10-CM

## 2021-05-26 DIAGNOSIS — D50.9 IRON DEFICIENCY ANEMIA, UNSPECIFIED: ICD-10-CM

## 2021-05-26 PROCEDURE — 96365 THER/PROPH/DIAG IV INF INIT: CPT

## 2021-05-26 RX ORDER — FERRIC CARBOXYMALTOSE INJECTION 50 MG/ML
750 INJECTION, SOLUTION INTRAVENOUS ONCE
Refills: 0 | Status: COMPLETED | OUTPATIENT
Start: 2021-05-26 | End: 2021-05-26

## 2021-05-26 RX ADMIN — FERRIC CARBOXYMALTOSE INJECTION 750 MILLIGRAM(S): 50 INJECTION, SOLUTION INTRAVENOUS at 13:20

## 2021-05-26 RX ADMIN — FERRIC CARBOXYMALTOSE INJECTION 530 MILLIGRAM(S): 50 INJECTION, SOLUTION INTRAVENOUS at 12:45

## 2021-06-02 ENCOUNTER — OUTPATIENT (OUTPATIENT)
Dept: OUTPATIENT SERVICES | Facility: HOSPITAL | Age: 66
LOS: 1 days | End: 2021-06-02
Payer: MEDICAID

## 2021-06-02 ENCOUNTER — APPOINTMENT (OUTPATIENT)
Age: 66
End: 2021-06-02

## 2021-06-02 VITALS
TEMPERATURE: 98 F | RESPIRATION RATE: 18 BRPM | OXYGEN SATURATION: 98 % | HEIGHT: 70 IN | WEIGHT: 132.06 LBS | SYSTOLIC BLOOD PRESSURE: 126 MMHG | HEART RATE: 76 BPM | DIASTOLIC BLOOD PRESSURE: 73 MMHG

## 2021-06-02 DIAGNOSIS — C16.9 MALIGNANT NEOPLASM OF STOMACH, UNSPECIFIED: ICD-10-CM

## 2021-06-02 DIAGNOSIS — D50.9 IRON DEFICIENCY ANEMIA, UNSPECIFIED: ICD-10-CM

## 2021-06-02 PROCEDURE — 96365 THER/PROPH/DIAG IV INF INIT: CPT

## 2021-06-02 RX ORDER — FERRIC CARBOXYMALTOSE INJECTION 50 MG/ML
750 INJECTION, SOLUTION INTRAVENOUS ONCE
Refills: 0 | Status: COMPLETED | OUTPATIENT
Start: 2021-06-02 | End: 2021-06-02

## 2021-06-02 RX ADMIN — FERRIC CARBOXYMALTOSE INJECTION 530 MILLIGRAM(S): 50 INJECTION, SOLUTION INTRAVENOUS at 10:08

## 2021-06-02 RX ADMIN — FERRIC CARBOXYMALTOSE INJECTION 750 MILLIGRAM(S): 50 INJECTION, SOLUTION INTRAVENOUS at 10:38

## 2021-06-07 ENCOUNTER — LABORATORY RESULT (OUTPATIENT)
Age: 66
End: 2021-06-07

## 2021-06-08 ENCOUNTER — APPOINTMENT (OUTPATIENT)
Dept: HEMATOLOGY ONCOLOGY | Facility: CLINIC | Age: 66
End: 2021-06-08
Payer: MEDICAID

## 2021-06-08 VITALS
SYSTOLIC BLOOD PRESSURE: 106 MMHG | RESPIRATION RATE: 18 BRPM | WEIGHT: 129 LBS | TEMPERATURE: 98.5 F | HEART RATE: 65 BPM | OXYGEN SATURATION: 99 % | HEIGHT: 70 IN | DIASTOLIC BLOOD PRESSURE: 67 MMHG | BODY MASS INDEX: 18.47 KG/M2

## 2021-06-08 PROCEDURE — 99214 OFFICE O/P EST MOD 30 MIN: CPT

## 2021-06-08 NOTE — ASSESSMENT
[FreeTextEntry1] : Mr. Murry is 65yr M with Stage III(ypT4b,pN2,cMo AJCC 8th ed) poorly  differentiated gastric cancer of the body and antrum with MSI-H, Her2 -ve, KRAS WT, PD- L1 expression with CPS 5, s/p 8 cycles of FLOT (4/16/20-8/12/20), Total gastrectomy with esophagojejunostomy with enblock resection of segment 2/3, periportal and D2 lymphadenectomy, feeding jejunostomy, complete omentectomy,oversewing of duodenum (9/14/20), (Xeloda)RT(12/14/20). Now with recurrent dz,isolated biopsy proven liver met segment 6. Started C1 Pembrolizumab with Dr. Mendoza at [place] on 4/20/21. He went to the ED at [facility] for pain and leakage from PEG tube on 5/27 and repeated CT AP with mixed response and PEG tube was removed after that. Today here for f/u after C3 Pembrolizumab on 6/1 \par \par 9/14/20\par surgical pathology report\par A. Omentum, Omentectomy: Benign omental fibroadipose tissue with chronic inflammation and reactive mesothelium\par B Lymph node, common bile duct , excision: One benign lymph node(0/1)\par C. Lymph node, Hepatic artery , excision: Metastatic adenocarcinoma involving three of three lymph node(3/3)\par D. Lymph node, portal vein excision: One benign lymph node(0/1)\par E. Stomach and Liber, Total gastrectomy and partial hepatectomy \par - Invasive poorly differentiated adenocarcinoma, measuring 8.0cm, predominantly involving gastric body and antrum\par -Tumor invades through entire thickness of gastric wall and into liver parenchyma. \par -All surgical margins( proximal ,distal, radial and liver resection margins) are free of tumor.\par - Focal lymphovascular space invasion present \par - No peripheral invasion seen\par -Metastatic adenocarcinoma involving one of twenty five lymph nodes(1/25)\par -Metastatic focus in lymph node measures 2.2cm\par -Background stomach shows intestinal metaplasia\par -AJCC 8th ed pathologic tumor stage: [id number]\par F.Lymph node, left gastric , excision:  One benign lymph node(0/1)\par G .Lymph node, Infrapancreatic, excision: Metastatic adenocarcinoma involving one of one lymph node(1/1) \par \par 2/3/21 CT CAP At [facility]\par 1.Interval development of left hepatic vein thrombosis as well as a large geographic region of hypodensity throughout the left hepatic lobe measuring 8.4x7.2x7.8cm. Finding could represent a perfusional anomaly related to left hepatic vein occlusion. However, a large metastatic lesion with associated left hepatic vein thrombosis is not excluded. Additionally, there is a 1.3am hypodensity in hepatic seg 6/7, which is also new and raises concern for a small metastatic focus. \par 2.Stable postsurgical changes of total gastrectomy and esophagojejunostomy and jejunojejunostomy , jejunostomy tube in place. No evidence of recurrence. No bowel obstruction. \par 3. No pulmonary nodule, focal consolidation, pleural effusion, or pneumothorax. No thoracic lymphadenopathy. \par 4. No abdominopelvic lymphadenopathy. \par \par 2/24/21 MRI Abdomen\par 1.Gastric adenocarcinoma s/p total gastrectomy with postsurgical appearance. No suspicious nodularity/enhancement at the surgical sites to suggest local recurrence. \par 2. Since the prior to CT dated 11/3/20 there has been interval increased atrophy of the left lateral segment, with  lack of uptake notes on hepatobiliary phase imaging  in a geographic distribution, suggestive of post radiation treatment changes. There is associated diminished caliber of the left portal vein and occluded left hepatic vein. Findings are most likely related to post radiation treatment changes of the left lateral segment. Infiltrative metastasis is considered much less likely. Recommend a 3 months f/u\par 3.Interval development of a new discrete 2 cm segment 6/7 hepatic lesion since the prior to CT AP dated 11/3/20 suspicious for viable hepatic metastasis. Additional indeterminate 0.3cm segment 5 hepatic lesion for which hepatic metastasis also remains on the differential diagnosis. \par \par 3/23/21\par CT guided liver biopsy\par -Adenocarcinoma, moderately-poorly differentiated \par positive for CK7, negative for CK20, CDX-2 Hepar-1\par This pattern of staining supports the diagnosis, but is not specific in regards to site of origin. Given  patient's history of gastric cancer, this morphology and immunophenotype would be compatible with a metastasis from that site.  \par \par 5/27/21 CT AP at [facility]\par 2.1 cm x 1.9 cm right hepatic mass previously 1.3x0.9cm. 1.1cm hypodense lesion right hepatic lobe not seen previously. Previously seen 8.4cmx7.2cm ill defined hypodensity throughout the left hepatic lobe is no longer seen. Imp: right hepatic mass increased in size. New small right hepatic lesion suspicious metastasis.\par \par Plan\par # Stage III(ypT4b,pN2,cMo AJCC 8th ed) MSI-H, Her2 -ve, KRAS-ve, PD- L1 expression with CPS 5  Gastric cancer, now with recurrent dz with liver mets, s/p C8 FLOT, Total gastrectomy with esophagojejunostomy, Xeloda/RT\par -MSI -H Her2 -ve, KRAS-ve, PD- L1 expression with CPS 5 \par -Started C1 Pembrolizumab with Dr. Mendoza at [place] on 4/20/21. Now S/P C3 on 6/1//21. Recent CT AP on 5/27 with mixed response. Continue on Pembrolizumab now and  He will complete C4 of pembrolizumab at [place] and then repeat CT scan and transfer his care to us. \par - SW f/u\par - Will repeat CBC, CMP, CEA today. Last CEA  was 114 from 253 on 5/5/21.  \par -Given clinical benefit (no longer requires PEG feeds), decreasing CEA and disappearance of large liver lesion, ct Pembrolizumab. If CEA increases and increased lesions, will add ipilimumab. \par \par # Weight loss/ Malnutrition/ s/p PEG tube removal\par  -Nutrition f/u \par \par # Insomnia\par -Rx for Xanax at bedtime as prn\par \par # Peripheral neuropathy \par - Monitor now\par - Continue on Gabapentin as needed\par - Referral to Dr. Valdovinos for further management\par \par # ADAN\par -s/p Injectafer x2 (5/26,6/2)\par \par RTC next week

## 2021-06-08 NOTE — PHYSICAL EXAM
[Restricted in physically strenuous activity but ambulatory and able to carry out work of a light or sedentary nature] : Status 1- Restricted in physically strenuous activity but ambulatory and able to carry out work of a light or sedentary nature, e.g., light house work, office work [Thin] : thin [Normal] : affect appropriate [de-identified] : s/p g- tube removal

## 2021-06-08 NOTE — REVIEW OF SYSTEMS
[Fatigue] : fatigue [Recent Change In Weight] : ~T recent weight change [Fever] : no fever [Chills] : no chills [Night Sweats] : no night sweats [Eye Pain] : no eye pain [Dysphagia] : no dysphagia [Odynophagia] : no odynophagia [Chest Pain] : no chest pain [Lower Ext Edema] : no lower extremity edema [Shortness Of Breath] : no shortness of breath [Wheezing] : no wheezing [Cough] : no cough [Abdominal Pain] : no abdominal pain [Vomiting] : no vomiting [Constipation] : no constipation [Diarrhea] : no diarrhea [Dysuria] : no dysuria [Muscle Weakness] : no muscle weakness [Confused] : no confusion [Dizziness] : no dizziness [Difficulty Walking] : no difficulty walking [FreeTextEntry2] : 3lb weight loss since May [FreeTextEntry7] : PEG tube removed [de-identified] : tingling to fingers/toes

## 2021-06-08 NOTE — HISTORY OF PRESENT ILLNESS
[de-identified] : Mr. Murry is 65yr M with Stage III(ypT4b,pN2,cMo AJCC 8th ed) poorly  differentiated gastric cancer of the body and antrum with  MSI-H, Her2 -ve, KRAS WT, PD- L1 expression with CPS 5 , s/p 8 cycles of FLOT (4/16/20-8/12/20), Total gastrectomy with esophagojejunostomy with enblock resection of segment 2/3, periportal and D2 lymphadenectomy, feeding jejunostomy, complete omentectomy,oversewing of duodenum (9/14/20), (Xeloda)RT(12/14/20). Now with recurrent dz,isolated biopsy proven liver met segment 6. Started C1 Pembrolizumab with Dr. Mendoza at [place] on 4/20/21. Today here for f/u after C3 Pembrolizumab 6/1//21  and wants to transfer his care to us. \par \par He was diagnosed with  Stage III(ypT4b,pN2,cMo) poorly  differentiated gastric cancer of the body and antrum, Her2 negative. He completed 8 cycles of FLOT chemotherapy 4/16/20-8/12/20  then had total gastrectomy with esophagojejunostomy with enblock resection of segment 2/3, periportal and D2 lymphadenectomy, feeding jejunostomy, complete omentectomy,oversewing of duodenum on 9/14/20 and completed post-op chemo(Xeloda)RT 12/14/20 at [facility]. Surveillance CT AP on 2/3/21 showed interval development of left hepatic vein thrombosis as well as a large geographic region of hypodensity throughout the left hepatic lobe measuring 8.4x7.2x7.8 cm . He underwent liver biopsy on 3/23/21, path confirmed moderately differentiated adenocarcinoma, positive for CK7, negative for CK20, CDX-2 , Hepar-1.  His oncologist at [facility] is suggesting he has Immunotherapy. He has sought second opinion from [facility] and is now here for a third opinion. He is reluctant to offer in depth information regarding Phx.\par \par 9/14/20\par surgical pathology report\par A. Omentum, Omentectomy: Benign omental fibroadipose tissue with chronic inflammation and reactive mesothelium\par B Lymph node, common bile duct , excision: One benign lymph node(0/1)\par C. Lymph node, Hepatic artery , excision: Metastatic adenocarcinoma involving three of three lymph node(3/3)\par D. Lymph node, portal vein excision: One benign lymph node(0/1)\par E. Stomach and Liber, Total gastrectomy and partial hepatectomy \par - Invasive poorly differentiated adenocarcinoma, measuring 8.0cm, predominantly involving gastric body and antrum\par -Tumor invades through entire thickness of gastric wall and into liver parenchyma. \par -All surgical margins( proximal ,distal, radial and liver resection margins) are free of tumor.\par - Focal lymphovascular space invasion present \par - No peripheral invasion seen\par -Metastatic adenocarcinoma involving one of twenty five lymph nodes(1/25)\par -Metastatic focus in lymph node measures 2.2cm\par -Background stomach shows intestinal metaplasia\par -AJCC 8th ed pathologic tumor stage: [id number]\par F.Lymph node, left gastric , excision:  One benign lymph node(0/1)\par G .Lymph node, Infrapancreatic, excision: Metastatic adenocarcinoma involving one of one lymph node(1/1) \par \par 2/3/21 CT CAP At [facility]\par 1.Interval development of left hepatic vein thrombosis as well as a large geographic region of hypodensity throughout the left hepatic lobe measuring 8.4x7.2x7.8cm. Finding could represent a perfusional anomaly related to left hepatic vein occlusion. However, a large metastatic lesion with associated left hepatic vein thrombosis is not excluded. Additionally, there is a 1.3am hypodensity in hepatic seg 6/7, which is also new and raises concern for a small metastatic focus. \par 2.Stable postsurgical changes of total gastrectomy and esophagojejunostomy and jejunojejunostomy , jejunostomy tube in place. No evidence of recurrence. No bowel obstruction. \par 3. No pulmonary nodule, focal consolidation, pleural effusion, or pneumothorax. No thoracic lymphadenopathy. \par 4. No abdominopelvic lymphadenopathy. \par \par 2/24/21 MRI Abdomen\par 1.Gastric adenocarcinoma s/p total gastrectomy with postsurgical appearance. No suspicious nodularity/enhancement at the surgical sites to suggest local recurrence. \par 2. Since the prior to CT dated 11/3/20 there has been interval increased atrophy of the left lateral segment, with  lack of uptake notes on hepatobiliary phase imaging  in a geographic distribution, suggestive of post radiation treatment changes. There is associated diminished caliber of the left portal vein and occluded left hepatic vein. Findings are most likely related to post radiation treatment changes of the left lateral segment. Infiltrative metastasis is considered much less likely. Recommend a 3 months f/u\par 3.Interval development of a new discrete 2 cm segment 6/7 hepatic lesion since the prior to CT AP dated 11/3/20 suspicious for viable hepatic metastasis. Additional indeterminate 0.3cm segment 5 hepatic lesion for which hepatic metastasis also remains on the differential diagnosis. \par \par 3/23/21\par CT guided liver biopsy\par -Adenocarcinoma, moderately-poorly differentiated \par positive for CK7, negative for CK20, CDX-2 Hepar-1\par This pattern of staining supports the diagnosis, but is not specific in regards to site of origin. Given  patient's history of gastric cancer, this morphology and immunophenotype would be compatible with a metastasis from that site.  \par \par 5/27/21 CT AP at [facility]\par 2.1 cm x 1.9 cm right hepatic mass previously 1.3x0.9cm. 1.1cm hypodense lesion right hepatic lobe not seen previously. Previously seen 8.4cmx7.2cm ill defined hypodensity throughout the left hepatic lobe is no longer seen. Imp: right hepatic mass increased in size. New small right hepatic lesion suspicious metastasis [de-identified] : Patient returns for follow-up visit. He was seen at [facility] ED last week for abd pain surrounding PEG tube which was later removed. States he has been drinking 2 Vital PO and 1 Ensure daily in addition to eating 3 meals a day, noted 3lb weight loss since last month. He currently denies pain and states he has completed C3 of Pembro as of June 1. Repeat Abd CT was done while in ED and notes increase in size of hepatic lesion as well as new lesion suspected to be a new met. He continues to have Neuropathy to hands and feet, appointment with Dr. Tavarez isn't until August, pt endorses taking Gabapentin on an inconsistent basis.

## 2021-06-09 LAB
ALBUMIN SERPL ELPH-MCNC: 4.4 G/DL
ALP BLD-CCNC: 157 U/L
ALT SERPL-CCNC: 25 U/L
ANION GAP SERPL CALC-SCNC: 14 MMOL/L
AST SERPL-CCNC: 29 U/L
BILIRUB SERPL-MCNC: 0.3 MG/DL
BUN SERPL-MCNC: 13 MG/DL
CALCIUM SERPL-MCNC: 9.1 MG/DL
CANCER AG19-9 SERPL-ACNC: <2 U/ML
CEA SERPL-MCNC: 55.5 NG/ML
CHLORIDE SERPL-SCNC: 105 MMOL/L
CO2 SERPL-SCNC: 23 MMOL/L
CREAT SERPL-MCNC: 0.64 MG/DL
GLUCOSE SERPL-MCNC: 61 MG/DL
POTASSIUM SERPL-SCNC: 4.3 MMOL/L
PROT SERPL-MCNC: 6.7 G/DL
SODIUM SERPL-SCNC: 143 MMOL/L

## 2021-06-15 ENCOUNTER — LABORATORY RESULT (OUTPATIENT)
Age: 66
End: 2021-06-15

## 2021-06-15 ENCOUNTER — APPOINTMENT (OUTPATIENT)
Dept: HEMATOLOGY ONCOLOGY | Facility: CLINIC | Age: 66
End: 2021-06-15
Payer: MEDICAID

## 2021-06-15 VITALS
WEIGHT: 130 LBS | BODY MASS INDEX: 18.61 KG/M2 | HEART RATE: 70 BPM | OXYGEN SATURATION: 96 % | HEIGHT: 70 IN | SYSTOLIC BLOOD PRESSURE: 107 MMHG | RESPIRATION RATE: 18 BRPM | TEMPERATURE: 98 F | DIASTOLIC BLOOD PRESSURE: 69 MMHG

## 2021-06-15 LAB
ALBUMIN SERPL ELPH-MCNC: 3.6 G/DL
ALP BLD-CCNC: 120 U/L
ALT SERPL-CCNC: 32 U/L
ANION GAP SERPL CALC-SCNC: 2 MMOL/L
AST SERPL-CCNC: 38 U/L
BILIRUB SERPL-MCNC: 0.6 MG/DL
BUN SERPL-MCNC: 11 MG/DL
CALCIUM SERPL-MCNC: 8.9 MG/DL
CHLORIDE SERPL-SCNC: 108 MMOL/L
CO2 SERPL-SCNC: 30 MMOL/L
CREAT SERPL-MCNC: 0.5 MG/DL
GLUCOSE SERPL-MCNC: 78 MG/DL
POTASSIUM SERPL-SCNC: 3.9 MMOL/L
PROT SERPL-MCNC: 6.5 G/DL
SODIUM SERPL-SCNC: 140 MMOL/L

## 2021-06-15 PROCEDURE — 99214 OFFICE O/P EST MOD 30 MIN: CPT

## 2021-06-15 NOTE — REVIEW OF SYSTEMS
[Fatigue] : fatigue [Recent Change In Weight] : ~T recent weight change [Fever] : no fever [Chills] : no chills [Night Sweats] : no night sweats [Eye Pain] : no eye pain [Dysphagia] : no dysphagia [Odynophagia] : no odynophagia [Chest Pain] : no chest pain [Lower Ext Edema] : no lower extremity edema [Shortness Of Breath] : no shortness of breath [Wheezing] : no wheezing [Cough] : no cough [Abdominal Pain] : no abdominal pain [Vomiting] : no vomiting [Constipation] : no constipation [Diarrhea] : no diarrhea [Dysuria] : no dysuria [Muscle Weakness] : no muscle weakness [Confused] : no confusion [Dizziness] : no dizziness [Difficulty Walking] : no difficulty walking [FreeTextEntry7] : PEG tube removed [de-identified] : tingling to fingers/toes

## 2021-06-15 NOTE — PHYSICAL EXAM
[Restricted in physically strenuous activity but ambulatory and able to carry out work of a light or sedentary nature] : Status 1- Restricted in physically strenuous activity but ambulatory and able to carry out work of a light or sedentary nature, e.g., light house work, office work [Thin] : thin [Normal] : affect appropriate [de-identified] : s/p g- tube removal

## 2021-06-15 NOTE — ASSESSMENT
[FreeTextEntry1] : Mr. Murry is 65yr M with Stage III(ypT4b,pN2,cMo AJCC 8th ed) poorly  differentiated gastric cancer of the body and antrum with MSI-H, Her2 -ve, KRAS WT, PD- L1 expression with CPS 5, s/p 8 cycles of FLOT (4/16/20-8/12/20), Total gastrectomy with esophagojejunostomy with enblock resection of segment 2/3, periportal and D2 lymphadenectomy, feeding jejunostomy, complete omentectomy,oversewing of duodenum (9/14/20), (Xeloda)RT(12/14/20). Now with recurrent dz,isolated biopsy proven liver met segment 6. Started C1 Pembrolizumab with Dr. Mendoza at [place] on 4/20/21. He went to the ED at [facility] for pain and leakage from PEG tube on 5/27 and repeated CT AP with mixed response and PEG tube was removed after that. Today here for f/u after C3 Pembrolizumab on 6/1 \par \par 9/14/20\par surgical pathology report\par A. Omentum, Omentectomy: Benign omental fibroadipose tissue with chronic inflammation and reactive mesothelium\par B Lymph node, common bile duct , excision: One benign lymph node(0/1)\par C. Lymph node, Hepatic artery , excision: Metastatic adenocarcinoma involving three of three lymph node(3/3)\par D. Lymph node, portal vein excision: One benign lymph node(0/1)\par E. Stomach and Liber, Total gastrectomy and partial hepatectomy \par - Invasive poorly differentiated adenocarcinoma, measuring 8.0cm, predominantly involving gastric body and antrum\par -Tumor invades through entire thickness of gastric wall and into liver parenchyma. \par -All surgical margins( proximal ,distal, radial and liver resection margins) are free of tumor.\par - Focal lymphovascular space invasion present \par - No peripheral invasion seen\par -Metastatic adenocarcinoma involving one of twenty five lymph nodes(1/25)\par -Metastatic focus in lymph node measures 2.2cm\par -Background stomach shows intestinal metaplasia\par -AJCC 8th ed pathologic tumor stage: [id number]\par F.Lymph node, left gastric , excision:  One benign lymph node(0/1)\par G .Lymph node, Infrapancreatic, excision: Metastatic adenocarcinoma involving one of one lymph node(1/1) \par \par 2/3/21 CT CAP At [facility]\par 1.Interval development of left hepatic vein thrombosis as well as a large geographic region of hypodensity throughout the left hepatic lobe measuring 8.4x7.2x7.8cm. Finding could represent a perfusional anomaly related to left hepatic vein occlusion. However, a large metastatic lesion with associated left hepatic vein thrombosis is not excluded. Additionally, there is a 1.3am hypodensity in hepatic seg 6/7, which is also new and raises concern for a small metastatic focus. \par 2.Stable postsurgical changes of total gastrectomy and esophagojejunostomy and jejunojejunostomy , jejunostomy tube in place. No evidence of recurrence. No bowel obstruction. \par 3. No pulmonary nodule, focal consolidation, pleural effusion, or pneumothorax. No thoracic lymphadenopathy. \par 4. No abdominopelvic lymphadenopathy. \par \par 2/24/21 MRI Abdomen\par 1.Gastric adenocarcinoma s/p total gastrectomy with postsurgical appearance. No suspicious nodularity/enhancement at the surgical sites to suggest local recurrence. \par 2. Since the prior to CT dated 11/3/20 there has been interval increased atrophy of the left lateral segment, with  lack of uptake notes on hepatobiliary phase imaging  in a geographic distribution, suggestive of post radiation treatment changes. There is associated diminished caliber of the left portal vein and occluded left hepatic vein. Findings are most likely related to post radiation treatment changes of the left lateral segment. Infiltrative metastasis is considered much less likely. Recommend a 3 months f/u\par 3.Interval development of a new discrete 2 cm segment 6/7 hepatic lesion since the prior to CT AP dated 11/3/20 suspicious for viable hepatic metastasis. Additional indeterminate 0.3cm segment 5 hepatic lesion for which hepatic metastasis also remains on the differential diagnosis. \par \par 3/23/21\par CT guided liver biopsy\par -Adenocarcinoma, moderately-poorly differentiated \par positive for CK7, negative for CK20, CDX-2 Hepar-1\par This pattern of staining supports the diagnosis, but is not specific in regards to site of origin. Given  patient's history of gastric cancer, this morphology and immunophenotype would be compatible with a metastasis from that site.  \par \par 5/27/21 CT AP at [facility]\par 2.1 cm x 1.9 cm right hepatic mass previously 1.3x0.9cm. 1.1cm hypodense lesion right hepatic lobe not seen previously. Previously seen 8.4cmx7.2cm ill defined hypodensity throughout the left hepatic lobe is no longer seen. Imp: right hepatic mass increased in size. New small right hepatic lesion suspicious metastasis.\par \par Plan\par # Stage III(ypT4b,pN2,cMo AJCC 8th ed) MSI-H, Her2 -ve, KRAS-ve, PD- L1 expression with CPS 5  Gastric cancer, now with recurrent dz with liver mets, s/p C8 FLOT, Total gastrectomy with esophagojejunostomy, Xeloda/RT\par -MSI -H Her2 -ve, KRAS-ve, PD- L1 expression with CPS 5 \par -Started C1 Pembrolizumab with Dr. Mendoza at [place] on 4/20/21. Now S/P C3 on 6/1//21. Recent CT AP on 5/27 with mixed response. Continue on Pembrolizumab now and  He will complete C4 of pembrolizumab at [place] and then repeat CT scan and transfer his care to us. \par - SW f/u\par - Will repeat CBC, CMP, CEA today. Last CEA  was 114 from 253 on 5/5/21, 55 on 6/9/21\par -Given clinical benefit (no longer requires PEG feeds), decreasing CEA and disappearance of large liver lesion, ct Pembrolizumab. If CEA increases and increased lesions, will add ipilimumab. \par \par # Weight loss/ Malnutrition/ s/p PEG tube removal\par  -Nutrition f/u \par \par # Insomnia\par -Rx for Xanax at bedtime as prn\par \par # Peripheral neuropathy \par - Monitor now\par - Continue on Gabapentin as needed\par - Referral to Dr. Valdovinos for further management\par \par # ADAN\par -s/p Injectafer x2 (5/26,6/2)\par \par RTC 2 weeks

## 2021-06-15 NOTE — HISTORY OF PRESENT ILLNESS
[de-identified] : Mr. Murry is 65yr M with Stage III(ypT4b,pN2,cMo AJCC 8th ed) poorly  differentiated gastric cancer of the body and antrum with  MSI-H, Her2 -ve, KRAS WT, PD- L1 expression with CPS 5 , s/p 8 cycles of FLOT (4/16/20-8/12/20), Total gastrectomy with esophagojejunostomy with enblock resection of segment 2/3, periportal and D2 lymphadenectomy, feeding jejunostomy, complete omentectomy,oversewing of duodenum (9/14/20), (Xeloda)RT(12/14/20). Now with recurrent dz,isolated biopsy proven liver met segment 6. Started C1 Pembrolizumab with Dr. Mendoza at [place] on 4/20/21. Today here for f/u after C3 Pembrolizumab 6/1//21  and wants to transfer his care to us. \par \par He was diagnosed with  Stage III(ypT4b,pN2,cMo) poorly  differentiated gastric cancer of the body and antrum, Her2 negative. He completed 8 cycles of FLOT chemotherapy 4/16/20-8/12/20  then had total gastrectomy with esophagojejunostomy with enblock resection of segment 2/3, periportal and D2 lymphadenectomy, feeding jejunostomy, complete omentectomy,oversewing of duodenum on 9/14/20 and completed post-op chemo(Xeloda)RT 12/14/20 at [facility]. Surveillance CT AP on 2/3/21 showed interval development of left hepatic vein thrombosis as well as a large geographic region of hypodensity throughout the left hepatic lobe measuring 8.4x7.2x7.8 cm . He underwent liver biopsy on 3/23/21, path confirmed moderately differentiated adenocarcinoma, positive for CK7, negative for CK20, CDX-2 , Hepar-1.  His oncologist at [facility] is suggesting he has Immunotherapy. He has sought second opinion from [facility] and is now here for a third opinion. He is reluctant to offer in depth information regarding Phx.\par \par 9/14/20\par surgical pathology report\par A. Omentum, Omentectomy: Benign omental fibroadipose tissue with chronic inflammation and reactive mesothelium\par B Lymph node, common bile duct , excision: One benign lymph node(0/1)\par C. Lymph node, Hepatic artery , excision: Metastatic adenocarcinoma involving three of three lymph node(3/3)\par D. Lymph node, portal vein excision: One benign lymph node(0/1)\par E. Stomach and Liber, Total gastrectomy and partial hepatectomy \par - Invasive poorly differentiated adenocarcinoma, measuring 8.0cm, predominantly involving gastric body and antrum\par -Tumor invades through entire thickness of gastric wall and into liver parenchyma. \par -All surgical margins( proximal ,distal, radial and liver resection margins) are free of tumor.\par - Focal lymphovascular space invasion present \par - No peripheral invasion seen\par -Metastatic adenocarcinoma involving one of twenty five lymph nodes(1/25)\par -Metastatic focus in lymph node measures 2.2cm\par -Background stomach shows intestinal metaplasia\par -AJCC 8th ed pathologic tumor stage: [id number]\par F.Lymph node, left gastric , excision:  One benign lymph node(0/1)\par G .Lymph node, Infrapancreatic, excision: Metastatic adenocarcinoma involving one of one lymph node(1/1) \par \par 2/3/21 CT CAP At [facility]\par 1.Interval development of left hepatic vein thrombosis as well as a large geographic region of hypodensity throughout the left hepatic lobe measuring 8.4x7.2x7.8cm. Finding could represent a perfusional anomaly related to left hepatic vein occlusion. However, a large metastatic lesion with associated left hepatic vein thrombosis is not excluded. Additionally, there is a 1.3am hypodensity in hepatic seg 6/7, which is also new and raises concern for a small metastatic focus. \par 2.Stable postsurgical changes of total gastrectomy and esophagojejunostomy and jejunojejunostomy , jejunostomy tube in place. No evidence of recurrence. No bowel obstruction. \par 3. No pulmonary nodule, focal consolidation, pleural effusion, or pneumothorax. No thoracic lymphadenopathy. \par 4. No abdominopelvic lymphadenopathy. \par \par 2/24/21 MRI Abdomen\par 1.Gastric adenocarcinoma s/p total gastrectomy with postsurgical appearance. No suspicious nodularity/enhancement at the surgical sites to suggest local recurrence. \par 2. Since the prior to CT dated 11/3/20 there has been interval increased atrophy of the left lateral segment, with  lack of uptake notes on hepatobiliary phase imaging  in a geographic distribution, suggestive of post radiation treatment changes. There is associated diminished caliber of the left portal vein and occluded left hepatic vein. Findings are most likely related to post radiation treatment changes of the left lateral segment. Infiltrative metastasis is considered much less likely. Recommend a 3 months f/u\par 3.Interval development of a new discrete 2 cm segment 6/7 hepatic lesion since the prior to CT AP dated 11/3/20 suspicious for viable hepatic metastasis. Additional indeterminate 0.3cm segment 5 hepatic lesion for which hepatic metastasis also remains on the differential diagnosis. \par \par 3/23/21\par CT guided liver biopsy\par -Adenocarcinoma, moderately-poorly differentiated \par positive for CK7, negative for CK20, CDX-2 Hepar-1\par This pattern of staining supports the diagnosis, but is not specific in regards to site of origin. Given  patient's history of gastric cancer, this morphology and immunophenotype would be compatible with a metastasis from that site.  \par \par 5/27/21 CT AP at [facility]\par 2.1 cm x 1.9 cm right hepatic mass previously 1.3x0.9cm. 1.1cm hypodense lesion right hepatic lobe not seen previously. Previously seen 8.4cmx7.2cm ill defined hypodensity throughout the left hepatic lobe is no longer seen. Imp: right hepatic mass increased in size. New small right hepatic lesion suspicious metastasis [de-identified] : 6/15/21- Reports ongoing peripheral neuropathy. Appetite is ok and weight is stable. Denies any pain, SOB, chest pain, V/N/C/D, or fever.

## 2021-06-16 LAB — CEA SERPL-MCNC: 42.3 NG/ML

## 2021-07-06 ENCOUNTER — LABORATORY RESULT (OUTPATIENT)
Age: 66
End: 2021-07-06

## 2021-07-06 ENCOUNTER — APPOINTMENT (OUTPATIENT)
Dept: HEMATOLOGY ONCOLOGY | Facility: CLINIC | Age: 66
End: 2021-07-06
Payer: MEDICAID

## 2021-07-06 VITALS
DIASTOLIC BLOOD PRESSURE: 67 MMHG | RESPIRATION RATE: 18 BRPM | BODY MASS INDEX: 18.47 KG/M2 | SYSTOLIC BLOOD PRESSURE: 104 MMHG | TEMPERATURE: 98.1 F | HEIGHT: 70 IN | OXYGEN SATURATION: 100 % | HEART RATE: 81 BPM | WEIGHT: 129 LBS

## 2021-07-06 PROCEDURE — 99214 OFFICE O/P EST MOD 30 MIN: CPT

## 2021-07-06 RX ORDER — FERROUS SULFATE 220 (44)/5
220 (44 FE) SOLUTION, ORAL ORAL DAILY
Qty: 1 | Refills: 0 | Status: DISCONTINUED | COMMUNITY
Start: 2021-05-19 | End: 2021-07-06

## 2021-07-06 NOTE — PHYSICAL EXAM
[Restricted in physically strenuous activity but ambulatory and able to carry out work of a light or sedentary nature] : Status 1- Restricted in physically strenuous activity but ambulatory and able to carry out work of a light or sedentary nature, e.g., light house work, office work [Thin] : thin [Normal] : affect appropriate [de-identified] : s/p g- tube removal

## 2021-07-06 NOTE — REVIEW OF SYSTEMS
[Fatigue] : fatigue [Fever] : no fever [Chills] : no chills [Night Sweats] : no night sweats [Recent Change In Weight] : ~T no recent weight change [Eye Pain] : no eye pain [Dysphagia] : no dysphagia [Odynophagia] : no odynophagia [Chest Pain] : no chest pain [Lower Ext Edema] : no lower extremity edema [Shortness Of Breath] : no shortness of breath [Wheezing] : no wheezing [Cough] : no cough [Abdominal Pain] : no abdominal pain [Vomiting] : no vomiting [Constipation] : no constipation [Diarrhea] : no diarrhea [Dysuria] : no dysuria [Muscle Weakness] : no muscle weakness [Confused] : no confusion [Dizziness] : no dizziness [Difficulty Walking] : no difficulty walking [FreeTextEntry7] : PEG tube removed [de-identified] : tingling to fingers/toes

## 2021-07-06 NOTE — HISTORY OF PRESENT ILLNESS
[de-identified] : Mr. Murry is 65yr M with Stage III(ypT4b,pN2,cMo AJCC 8th ed) poorly  differentiated gastric cancer of the body and antrum with  MSI-H, Her2 -ve, KRAS WT, PD- L1 expression with CPS 5 , s/p 8 cycles of FLOT (4/16/20-8/12/20), Total gastrectomy with esophagojejunostomy with enblock resection of segment 2/3, periportal and D2 lymphadenectomy, feeding jejunostomy, complete omentectomy,oversewing of duodenum (9/14/20), (Xeloda)RT(12/14/20). Now with recurrent dz,isolated biopsy proven liver met segment 6. Started C1 Pembrolizumab with Dr. Mendoza at [place] on 4/20/21. Today here for f/u after C4 Pembrolizumab 6/22//21  and wants to transfer his care to us. \par \par He was diagnosed with  Stage III(ypT4b,pN2,cMo) poorly  differentiated gastric cancer of the body and antrum, Her2 negative. He completed 8 cycles of FLOT chemotherapy 4/16/20-8/12/20  then had total gastrectomy with esophagojejunostomy with enblock resection of segment 2/3, periportal and D2 lymphadenectomy, feeding jejunostomy, complete omentectomy,oversewing of duodenum on 9/14/20 and completed post-op chemo(Xeloda)RT 12/14/20 at [facility]. Surveillance CT AP on 2/3/21 showed interval development of left hepatic vein thrombosis as well as a large geographic region of hypodensity throughout the left hepatic lobe measuring 8.4x7.2x7.8 cm . He underwent liver biopsy on 3/23/21, path confirmed moderately differentiated adenocarcinoma, positive for CK7, negative for CK20, CDX-2 , Hepar-1.  His oncologist at [facility] is suggesting he has Immunotherapy. He has sought second opinion from [facility] and is now here for a third opinion. He is reluctant to offer in depth information regarding Phx.\par \par 9/14/20\par surgical pathology report\par A. Omentum, Omentectomy: Benign omental fibroadipose tissue with chronic inflammation and reactive mesothelium\par B Lymph node, common bile duct , excision: One benign lymph node(0/1)\par C. Lymph node, Hepatic artery , excision: Metastatic adenocarcinoma involving three of three lymph node(3/3)\par D. Lymph node, portal vein excision: One benign lymph node(0/1)\par E. Stomach and Liber, Total gastrectomy and partial hepatectomy \par - Invasive poorly differentiated adenocarcinoma, measuring 8.0cm, predominantly involving gastric body and antrum\par -Tumor invades through entire thickness of gastric wall and into liver parenchyma. \par -All surgical margins( proximal ,distal, radial and liver resection margins) are free of tumor.\par - Focal lymphovascular space invasion present \par - No peripheral invasion seen\par -Metastatic adenocarcinoma involving one of twenty five lymph nodes(1/25)\par -Metastatic focus in lymph node measures 2.2cm\par -Background stomach shows intestinal metaplasia\par -AJCC 8th ed pathologic tumor stage: [id number]\par F.Lymph node, left gastric , excision:  One benign lymph node(0/1)\par G .Lymph node, Infrapancreatic, excision: Metastatic adenocarcinoma involving one of one lymph node(1/1) \par \par 2/3/21 CT CAP At [facility]\par 1.Interval development of left hepatic vein thrombosis as well as a large geographic region of hypodensity throughout the left hepatic lobe measuring 8.4x7.2x7.8cm. Finding could represent a perfusional anomaly related to left hepatic vein occlusion. However, a large metastatic lesion with associated left hepatic vein thrombosis is not excluded. Additionally, there is a 1.3am hypodensity in hepatic seg 6/7, which is also new and raises concern for a small metastatic focus. \par 2.Stable postsurgical changes of total gastrectomy and esophagojejunostomy and jejunojejunostomy , jejunostomy tube in place. No evidence of recurrence. No bowel obstruction. \par 3. No pulmonary nodule, focal consolidation, pleural effusion, or pneumothorax. No thoracic lymphadenopathy. \par 4. No abdominopelvic lymphadenopathy. \par \par 2/24/21 MRI Abdomen\par 1.Gastric adenocarcinoma s/p total gastrectomy with postsurgical appearance. No suspicious nodularity/enhancement at the surgical sites to suggest local recurrence. \par 2. Since the prior to CT dated 11/3/20 there has been interval increased atrophy of the left lateral segment, with  lack of uptake notes on hepatobiliary phase imaging  in a geographic distribution, suggestive of post radiation treatment changes. There is associated diminished caliber of the left portal vein and occluded left hepatic vein. Findings are most likely related to post radiation treatment changes of the left lateral segment. Infiltrative metastasis is considered much less likely. Recommend a 3 months f/u\par 3.Interval development of a new discrete 2 cm segment 6/7 hepatic lesion since the prior to CT AP dated 11/3/20 suspicious for viable hepatic metastasis. Additional indeterminate 0.3cm segment 5 hepatic lesion for which hepatic metastasis also remains on the differential diagnosis. \par \par 3/23/21\par CT guided liver biopsy\par -Adenocarcinoma, moderately-poorly differentiated \par positive for CK7, negative for CK20, CDX-2 Hepar-1\par This pattern of staining supports the diagnosis, but is not specific in regards to site of origin. Given  patient's history of gastric cancer, this morphology and immunophenotype would be compatible with a metastasis from that site.  \par \par 5/27/21 CT AP at [facility]\par 2.1 cm x 1.9 cm right hepatic mass previously 1.3x0.9cm. 1.1cm hypodense lesion right hepatic lobe not seen previously. Previously seen 8.4cmx7.2cm ill defined hypodensity throughout the left hepatic lobe is no longer seen. Imp: right hepatic mass increased in size. New small right hepatic lesion suspicious metastasis [de-identified] : Patient presents to clinic today for f/u. Recieved C4 pembro on 6/22. Continue to have ongoing peripheral neuropathy and takes Gabapentin with some help.  Appetite is ok and weight is stable. Denies any pain, SOB, chest pain, V/N/C/D, or fever.

## 2021-07-06 NOTE — ASSESSMENT
[FreeTextEntry1] : Mr. Murry is 65yr M with Stage III(ypT4b,pN2,cMo AJCC 8th ed) poorly  differentiated gastric cancer of the body and antrum with MSI-H, Her2 -ve, KRAS WT, PD- L1 expression with CPS 5, s/p 8 cycles of FLOT (4/16/20-8/12/20), Total gastrectomy with esophagojejunostomy with enblock resection of segment 2/3, periportal and D2 lymphadenectomy, feeding jejunostomy, complete omentectomy,oversewing of duodenum (9/14/20), (Xeloda)RT(12/14/20). Now with recurrent dz,isolated biopsy proven liver met segment 6. Started C1 Pembrolizumab with Dr. Mendoza at [place] on 4/20/21. He went to the ED at [facility] for pain and leakage from PEG tube on 5/27 and repeated CT AP with mixed response and PEG tube was removed after that. Today here for f/u after C4 Pembrolizumab on 6/22. Tolerating Tx well, symptomatically improved and CEA has been trending down.\par \par 9/14/20\par surgical pathology report\par A. Omentum, Omentectomy: Benign omental fibroadipose tissue with chronic inflammation and reactive mesothelium\par B Lymph node, common bile duct , excision: One benign lymph node(0/1)\par C. Lymph node, Hepatic artery , excision: Metastatic adenocarcinoma involving three of three lymph node(3/3)\par D. Lymph node, portal vein excision: One benign lymph node(0/1)\par E. Stomach and Liber, Total gastrectomy and partial hepatectomy \par - Invasive poorly differentiated adenocarcinoma, measuring 8.0cm, predominantly involving gastric body and antrum\par -Tumor invades through entire thickness of gastric wall and into liver parenchyma. \par -All surgical margins( proximal ,distal, radial and liver resection margins) are free of tumor.\par - Focal lymphovascular space invasion present \par - No peripheral invasion seen\par -Metastatic adenocarcinoma involving one of twenty five lymph nodes(1/25)\par -Metastatic focus in lymph node measures 2.2cm\par -Background stomach shows intestinal metaplasia\par -AJCC 8th ed pathologic tumor stage: [id number]\par F.Lymph node, left gastric , excision:  One benign lymph node(0/1)\par G .Lymph node, Infrapancreatic, excision: Metastatic adenocarcinoma involving one of one lymph node(1/1) \par \par 2/3/21 CT CAP At [facility]\par 1.Interval development of left hepatic vein thrombosis as well as a large geographic region of hypodensity throughout the left hepatic lobe measuring 8.4x7.2x7.8cm. Finding could represent a perfusional anomaly related to left hepatic vein occlusion. However, a large metastatic lesion with associated left hepatic vein thrombosis is not excluded. Additionally, there is a 1.3am hypodensity in hepatic seg 6/7, which is also new and raises concern for a small metastatic focus. \par 2.Stable postsurgical changes of total gastrectomy and esophagojejunostomy and jejunojejunostomy , jejunostomy tube in place. No evidence of recurrence. No bowel obstruction. \par 3. No pulmonary nodule, focal consolidation, pleural effusion, or pneumothorax. No thoracic lymphadenopathy. \par 4. No abdominopelvic lymphadenopathy. \par \par 2/24/21 MRI Abdomen\par 1.Gastric adenocarcinoma s/p total gastrectomy with postsurgical appearance. No suspicious nodularity/enhancement at the surgical sites to suggest local recurrence. \par 2. Since the prior to CT dated 11/3/20 there has been interval increased atrophy of the left lateral segment, with  lack of uptake notes on hepatobiliary phase imaging  in a geographic distribution, suggestive of post radiation treatment changes. There is associated diminished caliber of the left portal vein and occluded left hepatic vein. Findings are most likely related to post radiation treatment changes of the left lateral segment. Infiltrative metastasis is considered much less likely. Recommend a 3 months f/u\par 3.Interval development of a new discrete 2 cm segment 6/7 hepatic lesion since the prior to CT AP dated 11/3/20 suspicious for viable hepatic metastasis. Additional indeterminate 0.3cm segment 5 hepatic lesion for which hepatic metastasis also remains on the differential diagnosis. \par \par 3/23/21\par CT guided liver biopsy\par -Adenocarcinoma, moderately-poorly differentiated \par positive for CK7, negative for CK20, CDX-2 Hepar-1\par This pattern of staining supports the diagnosis, but is not specific in regards to site of origin. Given  patient's history of gastric cancer, this morphology and immunophenotype would be compatible with a metastasis from that site.  \par \par 5/27/21 CT AP at [facility]\par 2.1 cm x 1.9 cm right hepatic mass previously 1.3x0.9cm. 1.1cm hypodense lesion right hepatic lobe not seen previously. Previously seen 8.4cmx7.2cm ill defined hypodensity throughout the left hepatic lobe is no longer seen. Imp: right hepatic mass increased in size. New small right hepatic lesion suspicious metastasis.\par \par Plan\par # Stage III(ypT4b,pN2,cMo AJCC 8th ed) MSI-H, Her2 -ve, KRAS-ve, PD- L1 expression with CPS 5  Gastric cancer, now with recurrent dz with liver mets, s/p C8 FLOT, Total gastrectomy with esophagojejunostomy, Xeloda/RT and then noted to have local and hep recurrence\par -Started C1 Pembrolizumab with Dr. Mendoza at [place] on 4/20/21. Now S/P C4 on 6/1//22. Recent CT AP on 5/27 with mixed response. Continue on Pembrolizumab now and  He is scheduled for MRI AP at  [place] on 7/10 and will be receiving C5 Pembro on 7/13  and transfer his care to us. \par - SW f/u\par - Will repeat CBC, CMP, CEA today. Recent CEA on 6/14  was 42.3 from 253 on 5/5/2. \par -Given clinical benefit (no longer requires PEG feeds), decreasing CEA and disappearance of large liver lesion, ct Pembrolizumab. If CEA increases and increased lesions, will add ipilimumab. \par - Will send ct DNA today\par \par # Weight loss/ Malnutrition/ s/p PEG tube removal\par  -Nutrition f/u \par \par # Peripheral neuropathy \par - Monitor now\par - Continue on Gabapentin as needed\par - Referral to Dr. Valdovinos for further management\par \par # ADAN\par -s/p Injectafer x2 (5/26,6/2)\par \par RTC on 7/20 after C5 Pembro

## 2021-07-07 LAB — CEA SERPL-MCNC: 20.2 NG/ML

## 2021-07-27 ENCOUNTER — APPOINTMENT (OUTPATIENT)
Dept: HEMATOLOGY ONCOLOGY | Facility: CLINIC | Age: 66
End: 2021-07-27
Payer: MEDICAID

## 2021-07-27 PROCEDURE — 99214 OFFICE O/P EST MOD 30 MIN: CPT

## 2021-07-27 NOTE — REVIEW OF SYSTEMS
[Fatigue] : fatigue [Fever] : no fever [Chills] : no chills [Night Sweats] : no night sweats [Recent Change In Weight] : ~T no recent weight change [Eye Pain] : no eye pain [Dysphagia] : no dysphagia [Odynophagia] : no odynophagia [Chest Pain] : no chest pain [Lower Ext Edema] : no lower extremity edema [Shortness Of Breath] : no shortness of breath [Wheezing] : no wheezing [Cough] : no cough [Abdominal Pain] : no abdominal pain [Vomiting] : no vomiting [Constipation] : no constipation [Diarrhea] : no diarrhea [Dysuria] : no dysuria [Muscle Weakness] : no muscle weakness [Confused] : no confusion [Dizziness] : no dizziness [Difficulty Walking] : no difficulty walking [FreeTextEntry7] : PEG tube removed, open wound in left abdominal region [de-identified] : tingling to fingers/toes, burning in body, Progressive confusion/memory loss noticeable in last month

## 2021-07-27 NOTE — HISTORY OF PRESENT ILLNESS
[de-identified] : Mr. Murry is 65yr M with Stage III(ypT4b,pN2,cMo AJCC 8th ed) poorly  differentiated gastric cancer of the body and antrum with MSI-H, Her2 -ve, KRAS WT, PD- L1 expression with CPS 5, s/p 8 cycles of FLOT (4/16/20-8/12/20), Total gastrectomy with esophagojejunostomy with enblock resection of segment 2/3, periportal and D2 lymphadenectomy, feeding jejunostomy, complete omentectomy,oversewing of duodenum (9/14/20), (Xeloda)RT(12/14/20). Now with recurrent dz,isolated biopsy proven liver met segment 6. Started C1 Pembrolizumab with Dr. Mendoza at [place] on 4/20/21. He went to the ED at [facility] for pain and leakage from PEG tube on 5/27 and repeated CT AP with mixed response and PEG tube was removed after that. Today here for f/u after C5 Pembrolizumab on 7/20/21. Tolerating Tx well, symptomatically improved and CEA has been trending down. MR Abdomen (07/13) showing decreased in left and right lobe mets. Patient here for follow-up of MRI report.  \par \par He was diagnosed with  Stage III(ypT4b,pN2,cMo) poorly  differentiated gastric cancer of the body and antrum, Her2 negative. He completed 8 cycles of FLOT chemotherapy 4/16/20-8/12/20  then had total gastrectomy with esophagojejunostomy with enblock resection of segment 2/3, periportal and D2 lymphadenectomy, feeding jejunostomy, complete omentectomy,oversewing of duodenum on 9/14/20 and completed post-op chemo(Xeloda)RT 12/14/20 at [facility]. Surveillance CT AP on 2/3/21 showed interval development of left hepatic vein thrombosis as well as a large geographic region of hypodensity throughout the left hepatic lobe measuring 8.4x7.2x7.8 cm . He underwent liver biopsy on 3/23/21, path confirmed moderately differentiated adenocarcinoma, positive for CK7, negative for CK20, CDX-2 , Hepar-1.  His oncologist at [facility] is suggesting he has Immunotherapy. He has sought second opinion from [facility] and is now here for a third opinion. He is reluctant to offer in depth information regarding Phx.\par \par 9/14/20\par surgical pathology report\par A. Omentum, Omentectomy: Benign omental fibroadipose tissue with chronic inflammation and reactive mesothelium\par B Lymph node, common bile duct , excision: One benign lymph node(0/1)\par C. Lymph node, Hepatic artery , excision: Metastatic adenocarcinoma involving three of three lymph node(3/3)\par D. Lymph node, portal vein excision: One benign lymph node(0/1)\par E. Stomach and Liber, Total gastrectomy and partial hepatectomy \par - Invasive poorly differentiated adenocarcinoma, measuring 8.0cm, predominantly involving gastric body and antrum\par -Tumor invades through entire thickness of gastric wall and into liver parenchyma. \par -All surgical margins( proximal ,distal, radial and liver resection margins) are free of tumor.\par - Focal lymphovascular space invasion present \par - No peripheral invasion seen\par -Metastatic adenocarcinoma involving one of twenty five lymph nodes(1/25)\par -Metastatic focus in lymph node measures 2.2cm\par -Background stomach shows intestinal metaplasia\par -AJCC 8th ed pathologic tumor stage: [id number]\par F.Lymph node, left gastric , excision:  One benign lymph node(0/1)\par G .Lymph node, Infrapancreatic, excision: Metastatic adenocarcinoma involving one of one lymph node(1/1) \par \par 2/3/21 CT CAP At [facility]\par 1.Interval development of left hepatic vein thrombosis as well as a large geographic region of hypodensity throughout the left hepatic lobe measuring 8.4x7.2x7.8cm. Finding could represent a perfusional anomaly related to left hepatic vein occlusion. However, a large metastatic lesion with associated left hepatic vein thrombosis is not excluded. Additionally, there is a 1.3am hypodensity in hepatic seg 6/7, which is also new and raises concern for a small metastatic focus. \par 2.Stable postsurgical changes of total gastrectomy and esophagojejunostomy and jejunojejunostomy , jejunostomy tube in place. No evidence of recurrence. No bowel obstruction. \par 3. No pulmonary nodule, focal consolidation, pleural effusion, or pneumothorax. No thoracic lymphadenopathy. \par 4. No abdominopelvic lymphadenopathy. \par \par 2/24/21 MRI Abdomen\par 1.Gastric adenocarcinoma s/p total gastrectomy with postsurgical appearance. No suspicious nodularity/enhancement at the surgical sites to suggest local recurrence. \par 2. Since the prior to CT dated 11/3/20 there has been interval increased atrophy of the left lateral segment, with  lack of uptake notes on hepatobiliary phase imaging  in a geographic distribution, suggestive of post radiation treatment changes. There is associated diminished caliber of the left portal vein and occluded left hepatic vein. Findings are most likely related to post radiation treatment changes of the left lateral segment. Infiltrative metastasis is considered much less likely. Recommend a 3 months f/u\par 3.Interval development of a new discrete 2 cm segment 6/7 hepatic lesion since the prior to CT AP dated 11/3/20 suspicious for viable hepatic metastasis. Additional indeterminate 0.3cm segment 5 hepatic lesion for which hepatic metastasis also remains on the differential diagnosis. \par \par 3/23/21\par CT guided liver biopsy\par -Adenocarcinoma, moderately-poorly differentiated \par positive for CK7, negative for CK20, CDX-2 Hepar-1\par This pattern of staining supports the diagnosis, but is not specific in regards to site of origin. Given  patient's history of gastric cancer, this morphology and immunophenotype would be compatible with a metastasis from that site.  \par \par 5/27/21 CT AP at [facility]\par 2.1 cm x 1.9 cm right hepatic mass previously 1.3x0.9cm. 1.1cm hypodense lesion right hepatic lobe not seen previously. Previously seen 8.4cmx7.2cm ill defined hypodensity throughout the left hepatic lobe is no longer seen. Imp: right hepatic mass increased in size. New small right hepatic lesion suspicious metastasis\par \par 07/13/21 MR Abdomen w/wo IV Contrast ([facility])\par Progressive atrophy of the left lobe of the liver and metastatic lesion with hypoenhancing defect on hepatobiliary phase imaging measuring approximately 4.2 x 3.2 cm, decreased in size from February CT where it measured 8.4 x 7.2 cm and from May CT where it measured approximately 4.3 x 4.1 cm. Hypoenhancing segment 6/7 lesion measuring 2.1 cm on prior CT scan measures 1.4 cm on the current exam. Segment 6 lesion is not readily seen on current study. \par \par No new liver metastasis are identified. MRI Findings of hepatic iron deposition. Hemochromatosis protocol by be performed as needed. Asymmetric left hemidiaphragm elevation as above.  [de-identified] : Received Keytruda on 07/20. Continue to have ongoing peripheral neuropathy and takes Gabapentin PRN once daily with some help.  Appetite is ok and weight is stable. Denies any pain, SOB, chest pain, V/N/C/D, skin rash, or fever. \par \par Has burning sensation in body and has been more confused in the past month.

## 2021-07-27 NOTE — PHYSICAL EXAM
[Restricted in physically strenuous activity but ambulatory and able to carry out work of a light or sedentary nature] : Status 1- Restricted in physically strenuous activity but ambulatory and able to carry out work of a light or sedentary nature, e.g., light house work, office work [Thin] : thin [Normal] : affect appropriate [de-identified] : s/p g- tube removal with clean open wound without surrounding erythema/tenderness

## 2021-07-27 NOTE — ASSESSMENT
[FreeTextEntry1] : Mr. Murry is 65yr M with Stage III(ypT4b,pN2,cMo AJCC 8th ed) poorly  differentiated gastric cancer of the body and antrum with MSI-H, Her2 -ve, KRAS WT, PD- L1 expression with CPS 5, s/p 8 cycles of FLOT (4/16/20-8/12/20), Total gastrectomy with esophagojejunostomy with enblock resection of segment 2/3, periportal and D2 lymphadenectomy, feeding jejunostomy, complete omentectomy,oversewing of duodenum (9/14/20), (Xeloda)RT(12/14/20). Now with recurrent dz,isolated biopsy proven liver met segment 6. Started C1 Pembrolizumab with Dr. Mendoza at [facility] on 4/20/21. He went to the ED at [facility] for pain and leakage from PEG tube on 5/27 and repeated CT AP with mixed response and PEG tube was removed after that. Today here for f/u after C5 Pembrolizumab on 7/13/21. Tolerating Tx well, symptomatically improved and CEA has been trending down. MR Abdomen (07/20) showing decreased in left and right lobe mets. Patient here for follow-up of MRI report. \par \par 9/14/20\par surgical pathology report\par A. Omentum, Omentectomy: Benign omental fibroadipose tissue with chronic inflammation and reactive mesothelium\par B Lymph node, common bile duct , excision: One benign lymph node(0/1)\par C. Lymph node, Hepatic artery , excision: Metastatic adenocarcinoma involving three of three lymph node(3/3)\par D. Lymph node, portal vein excision: One benign lymph node(0/1)\par E. Stomach and Liber, Total gastrectomy and partial hepatectomy \par - Invasive poorly differentiated adenocarcinoma, measuring 8.0cm, predominantly involving gastric body and antrum\par -Tumor invades through entire thickness of gastric wall and into liver parenchyma. \par -All surgical margins( proximal ,distal, radial and liver resection margins) are free of tumor.\par - Focal lymphovascular space invasion present \par - No peripheral invasion seen\par -Metastatic adenocarcinoma involving one of twenty five lymph nodes(1/25)\par -Metastatic focus in lymph node measures 2.2cm\par -Background stomach shows intestinal metaplasia\par -AJCC 8th ed pathologic tumor stage: [id number]\par F.Lymph node, left gastric , excision:  One benign lymph node(0/1)\par G .Lymph node, Infrapancreatic, excision: Metastatic adenocarcinoma involving one of one lymph node(1/1) \par \par 2/3/21 CT CAP At [facility]\par 1.Interval development of left hepatic vein thrombosis as well as a large geographic region of hypodensity throughout the left hepatic lobe measuring 8.4x7.2x7.8cm. Finding could represent a perfusional anomaly related to left hepatic vein occlusion. However, a large metastatic lesion with associated left hepatic vein thrombosis is not excluded. Additionally, there is a 1.3am hypodensity in hepatic seg 6/7, which is also new and raises concern for a small metastatic focus. \par 2.Stable postsurgical changes of total gastrectomy and esophagojejunostomy and jejunojejunostomy , jejunostomy tube in place. No evidence of recurrence. No bowel obstruction. \par 3. No pulmonary nodule, focal consolidation, pleural effusion, or pneumothorax. No thoracic lymphadenopathy. \par 4. No abdominopelvic lymphadenopathy. \par \par 2/24/21 MRI Abdomen\par 1.Gastric adenocarcinoma s/p total gastrectomy with postsurgical appearance. No suspicious nodularity/enhancement at the surgical sites to suggest local recurrence. \par 2. Since the prior to CT dated 11/3/20 there has been interval increased atrophy of the left lateral segment, with  lack of uptake notes on hepatobiliary phase imaging  in a geographic distribution, suggestive of post radiation treatment changes. There is associated diminished caliber of the left portal vein and occluded left hepatic vein. Findings are most likely related to post radiation treatment changes of the left lateral segment. Infiltrative metastasis is considered much less likely. Recommend a 3 months f/u\par 3.Interval development of a new discrete 2 cm segment 6/7 hepatic lesion since the prior to CT AP dated 11/3/20 suspicious for viable hepatic metastasis. Additional indeterminate 0.3cm segment 5 hepatic lesion for which hepatic metastasis also remains on the differential diagnosis. \par \par 3/23/21\par CT guided liver biopsy\par -Adenocarcinoma, moderately-poorly differentiated \par positive for CK7, negative for CK20, CDX-2 Hepar-1\par This pattern of staining supports the diagnosis, but is not specific in regards to site of origin. Given  patient's history of gastric cancer, this morphology and immunophenotype would be compatible with a metastasis from that site.  \par \par 5/27/21 CT AP at [facility]\par 2.1 cm x 1.9 cm right hepatic mass previously 1.3x0.9cm. 1.1cm hypodense lesion right hepatic lobe not seen previously. Previously seen 8.4cmx7.2cm ill defined hypodensity throughout the left hepatic lobe is no longer seen. Imp: right hepatic mass increased in size. New small right hepatic lesion suspicious metastasis.\par \par 07/13/21 MR Abdomen w/wo IV Contrast ([facility])\par Progressive atrophy of the left lobe of the liver and metastatic lesion with hypoenhancing defect on hepatobiliary phase imaging measuring approximately 4.2 x 3.2 cm, decreased in size from February CT where it measured 8.4 x 7.2 cm and from May CT where it measured approximately 4.3 x 4.1 cm. Hypoenhancing segment 6/7 lesion measuring 2.1 cm on prior CT scan measures 1.4 cm on the current exam. Segment 6 lesion is not readily seen on current study. \par \par No new liver metastasis are identified. MRI Findings of hepatic iron deposition. Hemochromatosis protocol by be performed as needed. Asymmetric left hemidiaphragm elevation as above. \par \par Plan\par # Stage III(ypT4b,pN2,cMo AJCC 8th ed) MSI-H, Her2 -ve, KRAS-ve, PD- L1 expression CPS 5  Gastric cancer, now with recurrent dz with liver mets\par -s/p C8 FLOT, Total gastrectomy with esophagojejunostomy, Xeloda/RT and then noted to have local and hep recurrence\par -Started C1 Pembrolizumab with Dr. Mendoza at [facility] on 4/20/21. Now S/P C5 on 07/20. Recent CT AP on 5/27 with mixed response. MR Abdomen/Pelvis on 07/13 showing decrease in size of liver mets. Continue on Pembrolizumab for now, potentially for 3 more cycles. Received C5 Pembro on 7/20. \par - Patient to decide by next week to see whether or not he would like to transfer all of his care to [facility] versus [facility]. Patient may consider surgery here at [facility] if liver lesions continue to shrink. \par - SW f/u\par - Will repeat CBC, CMP, CEA today. Recent CEA on 7/6/20 was 20,  42.3 (06/14/21) and 253 (05/05/21). \par -Given clinical benefit (no longer requires PEG feeds), decreasing CEA and disappearance of large liver lesion, continue with Pembrolizumab. If CEA increases and increased lesions, will add ipilimumab. \par - Follow-up ct DNA \par \par # Weight loss/ Malnutrition/ s/p PEG tube removal\par - Nutrition f/u \par \par # Stoma wound\par - Follow-up with Surgeon at [facility] regarding wound care for now\par \par # Peripheral neuropathy/confusion \par - Stop Gabapentin for now due to report of confusion. May also be potentially related to IO side effect. Will reevaluate next week after cessation of Gabapentin\par - Referral to Dr. Valdovinos (next appointment on 08/13)\par \par # ADAN\par -s/p Injectafer x2 (5/26,6/2)\par \par RTC on 08/03/21

## 2021-07-28 LAB
ALBUMIN SERPL ELPH-MCNC: 4.1 G/DL
ALP BLD-CCNC: 142 U/L
ALT SERPL-CCNC: 34 U/L
ANION GAP SERPL CALC-SCNC: 8 MMOL/L
AST SERPL-CCNC: 37 U/L
BASOPHILS # BLD AUTO: 0.03 K/UL
BASOPHILS NFR BLD AUTO: 1 %
BILIRUB SERPL-MCNC: 0.4 MG/DL
BUN SERPL-MCNC: 10 MG/DL
CALCIUM SERPL-MCNC: 8.7 MG/DL
CEA SERPL-MCNC: 11.2 NG/ML
CHLORIDE SERPL-SCNC: 104 MMOL/L
CO2 SERPL-SCNC: 27 MMOL/L
CREAT SERPL-MCNC: 0.75 MG/DL
EOSINOPHIL # BLD AUTO: 0.13 K/UL
EOSINOPHIL NFR BLD AUTO: 4.3 %
GLUCOSE SERPL-MCNC: 48 MG/DL
HCT VFR BLD CALC: 35.5 %
HGB BLD-MCNC: 11.6 G/DL
IMM GRANULOCYTES NFR BLD AUTO: 0.3 %
LYMPHOCYTES # BLD AUTO: 0.82 K/UL
LYMPHOCYTES NFR BLD AUTO: 27.2 %
MAN DIFF?: NORMAL
MCHC RBC-ENTMCNC: 28.7 PG
MCHC RBC-ENTMCNC: 32.7 GM/DL
MCV RBC AUTO: 87.9 FL
MONOCYTES # BLD AUTO: 0.49 K/UL
MONOCYTES NFR BLD AUTO: 16.2 %
NEUTROPHILS # BLD AUTO: 1.54 K/UL
NEUTROPHILS NFR BLD AUTO: 51 %
PLATELET # BLD AUTO: 181 K/UL
POTASSIUM SERPL-SCNC: 4.4 MMOL/L
PROT SERPL-MCNC: 6.2 G/DL
RBC # BLD: 4.04 M/UL
RBC # FLD: 16.5 %
SODIUM SERPL-SCNC: 139 MMOL/L
WBC # FLD AUTO: 3.02 K/UL

## 2021-08-04 ENCOUNTER — LABORATORY RESULT (OUTPATIENT)
Age: 66
End: 2021-08-04

## 2021-08-04 ENCOUNTER — APPOINTMENT (OUTPATIENT)
Dept: HEMATOLOGY ONCOLOGY | Facility: CLINIC | Age: 66
End: 2021-08-04
Payer: MEDICAID

## 2021-08-04 VITALS
SYSTOLIC BLOOD PRESSURE: 85 MMHG | OXYGEN SATURATION: 100 % | RESPIRATION RATE: 16 BRPM | DIASTOLIC BLOOD PRESSURE: 53 MMHG | BODY MASS INDEX: 18.47 KG/M2 | TEMPERATURE: 98.3 F | HEIGHT: 70 IN | WEIGHT: 129 LBS | HEART RATE: 92 BPM

## 2021-08-04 LAB
ALBUMIN SERPL ELPH-MCNC: 3.5 G/DL
ALP BLD-CCNC: 122 U/L
ALT SERPL-CCNC: 30 U/L
ANION GAP SERPL CALC-SCNC: 5 MMOL/L
AST SERPL-CCNC: 36 U/L
BILIRUB SERPL-MCNC: 0.6 MG/DL
BUN SERPL-MCNC: 12 MG/DL
CALCIUM SERPL-MCNC: 8.9 MG/DL
CHLORIDE SERPL-SCNC: 106 MMOL/L
CO2 SERPL-SCNC: 29 MMOL/L
CREAT SERPL-MCNC: 0.9 MG/DL
GLUCOSE SERPL-MCNC: 105 MG/DL
POTASSIUM SERPL-SCNC: 4.1 MMOL/L
PROT SERPL-MCNC: 6.3 G/DL
SODIUM SERPL-SCNC: 140 MMOL/L

## 2021-08-04 PROCEDURE — 99214 OFFICE O/P EST MOD 30 MIN: CPT

## 2021-08-04 NOTE — PHYSICAL EXAM
[Restricted in physically strenuous activity but ambulatory and able to carry out work of a light or sedentary nature] : Status 1- Restricted in physically strenuous activity but ambulatory and able to carry out work of a light or sedentary nature, e.g., light house work, office work [Thin] : thin [Normal] : affect appropriate [de-identified] : s/p g- tube removal

## 2021-08-04 NOTE — REVIEW OF SYSTEMS
[Fatigue] : fatigue [Fever] : no fever [Chills] : no chills [Night Sweats] : no night sweats [Recent Change In Weight] : ~T no recent weight change [Eye Pain] : no eye pain [Dysphagia] : no dysphagia [Odynophagia] : no odynophagia [Chest Pain] : no chest pain [Lower Ext Edema] : no lower extremity edema [Shortness Of Breath] : no shortness of breath [Wheezing] : no wheezing [Cough] : no cough [Abdominal Pain] : no abdominal pain [Vomiting] : no vomiting [Constipation] : no constipation [Diarrhea] : no diarrhea [Dysuria] : no dysuria [Muscle Weakness] : no muscle weakness [Confused] : no confusion [Dizziness] : no dizziness [Difficulty Walking] : no difficulty walking [FreeTextEntry7] : PEG tube removed [de-identified] : tingling to fingers/toes, burning in body, Progressive confusion/memory loss noticeable in last month

## 2021-08-04 NOTE — ASSESSMENT
[FreeTextEntry1] : Mr. Murry is 65yr M with Stage III(ypT4b,pN2,cMo AJCC 8th ed) poorly  differentiated gastric cancer of the body and antrum with MSI-H, Her2 -ve, KRAS WT, PD- L1 expression with CPS 5, s/p 8 cycles of FLOT (4/16/20-8/12/20), Total gastrectomy with esophagojejunostomy with enblock resection of segment 2/3, periportal and D2 lymphadenectomy, feeding jejunostomy, complete omentectomy,oversewing of duodenum (9/14/20), (Xeloda)RT(12/14/20). Now with recurrent dz,isolated biopsy proven liver met segment 6. Started C1 Pembrolizumab with Dr. Mendoza at [facility] on 4/20/21. He went to the ED at [facility] for pain and leakage from PEG tube on 5/27 and repeated CT AP with mixed response and PEG tube was removed after that. Today here for f/u after C5 Pembrolizumab on 7/13/21. Tolerating Tx well, symptomatically improved and CEA has been trending down. MR Abdomen (07/20) showing decreased in left and right lobe mets. Patient here for f/u\par \par 9/14/20\par surgical pathology report\par A. Omentum, Omentectomy: Benign omental fibroadipose tissue with chronic inflammation and reactive mesothelium\par B Lymph node, common bile duct , excision: One benign lymph node(0/1)\par C. Lymph node, Hepatic artery , excision: Metastatic adenocarcinoma involving three of three lymph node(3/3)\par D. Lymph node, portal vein excision: One benign lymph node(0/1)\par E. Stomach and Liber, Total gastrectomy and partial hepatectomy \par - Invasive poorly differentiated adenocarcinoma, measuring 8.0cm, predominantly involving gastric body and antrum\par -Tumor invades through entire thickness of gastric wall and into liver parenchyma. \par -All surgical margins( proximal ,distal, radial and liver resection margins) are free of tumor.\par - Focal lymphovascular space invasion present \par - No peripheral invasion seen\par -Metastatic adenocarcinoma involving one of twenty five lymph nodes(1/25)\par -Metastatic focus in lymph node measures 2.2cm\par -Background stomach shows intestinal metaplasia\par -AJCC 8th ed pathologic tumor stage: [id number]\par F.Lymph node, left gastric , excision:  One benign lymph node(0/1)\par G .Lymph node, Infrapancreatic, excision: Metastatic adenocarcinoma involving one of one lymph node(1/1) \par \par 2/3/21 CT CAP At [facility]\par 1.Interval development of left hepatic vein thrombosis as well as a large geographic region of hypodensity throughout the left hepatic lobe measuring 8.4x7.2x7.8cm. Finding could represent a perfusional anomaly related to left hepatic vein occlusion. However, a large metastatic lesion with associated left hepatic vein thrombosis is not excluded. Additionally, there is a 1.3am hypodensity in hepatic seg 6/7, which is also new and raises concern for a small metastatic focus. \par 2.Stable postsurgical changes of total gastrectomy and esophagojejunostomy and jejunojejunostomy , jejunostomy tube in place. No evidence of recurrence. No bowel obstruction. \par 3. No pulmonary nodule, focal consolidation, pleural effusion, or pneumothorax. No thoracic lymphadenopathy. \par 4. No abdominopelvic lymphadenopathy. \par \par 2/24/21 MRI Abdomen\par 1.Gastric adenocarcinoma s/p total gastrectomy with postsurgical appearance. No suspicious nodularity/enhancement at the surgical sites to suggest local recurrence. \par 2. Since the prior to CT dated 11/3/20 there has been interval increased atrophy of the left lateral segment, with  lack of uptake notes on hepatobiliary phase imaging  in a geographic distribution, suggestive of post radiation treatment changes. There is associated diminished caliber of the left portal vein and occluded left hepatic vein. Findings are most likely related to post radiation treatment changes of the left lateral segment. Infiltrative metastasis is considered much less likely. Recommend a 3 months f/u\par 3.Interval development of a new discrete 2 cm segment 6/7 hepatic lesion since the prior to CT AP dated 11/3/20 suspicious for viable hepatic metastasis. Additional indeterminate 0.3cm segment 5 hepatic lesion for which hepatic metastasis also remains on the differential diagnosis. \par \par 3/23/21\par CT guided liver biopsy\par -Adenocarcinoma, moderately-poorly differentiated \par positive for CK7, negative for CK20, CDX-2 Hepar-1\par This pattern of staining supports the diagnosis, but is not specific in regards to site of origin. Given  patient's history of gastric cancer, this morphology and immunophenotype would be compatible with a metastasis from that site.  \par \par 5/27/21 CT AP at [facility]\par 2.1 cm x 1.9 cm right hepatic mass previously 1.3x0.9cm. 1.1cm hypodense lesion right hepatic lobe not seen previously. Previously seen 8.4cmx7.2cm ill defined hypodensity throughout the left hepatic lobe is no longer seen. Imp: right hepatic mass increased in size. New small right hepatic lesion suspicious metastasis.\par \par 07/13/21 MR Abdomen w/wo IV Contrast ([facility])\par Progressive atrophy of the left lobe of the liver and metastatic lesion with hypoenhancing defect on hepatobiliary phase imaging measuring approximately 4.2 x 3.2 cm, decreased in size from February CT where it measured 8.4 x 7.2 cm and from May CT where it measured approximately 4.3 x 4.1 cm. Hypoenhancing segment 6/7 lesion measuring 2.1 cm on prior CT scan measures 1.4 cm on the current exam. Segment 6 lesion is not readily seen on current study. \par \par No new liver metastasis are identified. MRI Findings of hepatic iron deposition. Hemochromatosis protocol by be performed as needed. Asymmetric left hemidiaphragm elevation as above. \par \par Plan\par # Stage III(ypT4b,pN2,cMo AJCC 8th ed) MSI-H, Her2 -ve, KRAS-ve, PD- L1 expression CPS 5  Gastric cancer, now with recurrent dz with liver mets\par -s/p C8 FLOT, Total gastrectomy with esophagojejunostomy, Xeloda/RT and then noted to have local and hep recurrence\par -Started C1 Pembrolizumab with Dr. Mendoza at [facility] on 4/20/21. Now S/P C5 on 07/20. Recent CT AP on 5/27 with mixed response. MR Abdomen/Pelvis on 07/13 showing decrease in size of liver mets. Continue on Pembrolizumab for now, potentially for 3 more cycles. Received C5 Pembro on 7/20. C6 will be on 8/12. \par - Patient to decide by next week to see whether or not he would like to transfer all of his care to [facility] versus [facility]. Patient may consider surgery here at [facility] if liver lesions continue to shrink. \par - SW f/u\par - Will repeat CBC, CMP, CEA today. Recent CEA on 7/26/20 was 11.2,  42.3 (06/14/21) and 253 (05/05/21). \par -Given clinical benefit (no longer requires PEG feeds), decreasing CEA and disappearance of large liver lesion, continue with Pembrolizumab. If CEA increases and increased lesions, will add ipilimumab. \par - ct DNA  with pending results\par \par # Weight loss/ Malnutrition/ s/p PEG tube removal\par - Nutrition f/u \par \par # Peripheral neuropathy/confusion \par - Stop Gabapentin for now due to report of confusion. May also be potentially related to IO side effect. Will reevaluate next week after cessation of Gabapentin\par - Referral to Dr. Valdovinos (next appointment on 08/13)\par \par # ADAN\par -s/p Injectafer x2 (5/26,6/2)\par \par RTC on 8/25/21

## 2021-08-04 NOTE — HISTORY OF PRESENT ILLNESS
[de-identified] : Mr. Murry is 65yr M with Stage III(ypT4b,pN2,cMo AJCC 8th ed) poorly  differentiated gastric cancer of the body and antrum with MSI-H, Her2 -ve, KRAS WT, PD- L1 expression with CPS 5, s/p 8 cycles of FLOT (4/16/20-8/12/20), Total gastrectomy with esophagojejunostomy with enblock resection of segment 2/3, periportal and D2 lymphadenectomy, feeding jejunostomy, complete omentectomy,oversewing of duodenum (9/14/20), (Xeloda)RT(12/14/20). Now with recurrent dz,isolated biopsy proven liver met segment 6. Started C1 Pembrolizumab with Dr. Mendoza at [place] on 4/20/21. He went to the ED at [facility] for pain and leakage from PEG tube on 5/27 and repeated CT AP with mixed response and PEG tube was removed after that. Today here for f/u after C5 Pembrolizumab on 7/20/21. Tolerating Tx well, symptomatically improved and CEA has been trending down. MR Abdomen (07/13) showing decreased in left and right lobe mets. Patient here for follow-up. \par \par He was diagnosed with  Stage III(ypT4b,pN2,cMo) poorly  differentiated gastric cancer of the body and antrum, Her2 negative. He completed 8 cycles of FLOT chemotherapy 4/16/20-8/12/20  then had total gastrectomy with esophagojejunostomy with enblock resection of segment 2/3, periportal and D2 lymphadenectomy, feeding jejunostomy, complete omentectomy,oversewing of duodenum on 9/14/20 and completed post-op chemo(Xeloda)RT 12/14/20 at [facility]. Surveillance CT AP on 2/3/21 showed interval development of left hepatic vein thrombosis as well as a large geographic region of hypodensity throughout the left hepatic lobe measuring 8.4x7.2x7.8 cm . He underwent liver biopsy on 3/23/21, path confirmed moderately differentiated adenocarcinoma, positive for CK7, negative for CK20, CDX-2 , Hepar-1.  His oncologist at [facility] is suggesting he has Immunotherapy. He has sought second opinion from [facility] and is now here for a third opinion. He is reluctant to offer in depth information regarding Phx.\par \par 9/14/20\par surgical pathology report\par A. Omentum, Omentectomy: Benign omental fibroadipose tissue with chronic inflammation and reactive mesothelium\par B Lymph node, common bile duct , excision: One benign lymph node(0/1)\par C. Lymph node, Hepatic artery , excision: Metastatic adenocarcinoma involving three of three lymph node(3/3)\par D. Lymph node, portal vein excision: One benign lymph node(0/1)\par E. Stomach and Liber, Total gastrectomy and partial hepatectomy \par - Invasive poorly differentiated adenocarcinoma, measuring 8.0cm, predominantly involving gastric body and antrum\par -Tumor invades through entire thickness of gastric wall and into liver parenchyma. \par -All surgical margins( proximal ,distal, radial and liver resection margins) are free of tumor.\par - Focal lymphovascular space invasion present \par - No peripheral invasion seen\par -Metastatic adenocarcinoma involving one of twenty five lymph nodes(1/25)\par -Metastatic focus in lymph node measures 2.2cm\par -Background stomach shows intestinal metaplasia\par -AJCC 8th ed pathologic tumor stage: [id number]\par F.Lymph node, left gastric , excision:  One benign lymph node(0/1)\par G .Lymph node, Infrapancreatic, excision: Metastatic adenocarcinoma involving one of one lymph node(1/1) \par \par 2/3/21 CT CAP At [facility]\par 1.Interval development of left hepatic vein thrombosis as well as a large geographic region of hypodensity throughout the left hepatic lobe measuring 8.4x7.2x7.8cm. Finding could represent a perfusional anomaly related to left hepatic vein occlusion. However, a large metastatic lesion with associated left hepatic vein thrombosis is not excluded. Additionally, there is a 1.3am hypodensity in hepatic seg 6/7, which is also new and raises concern for a small metastatic focus. \par 2.Stable postsurgical changes of total gastrectomy and esophagojejunostomy and jejunojejunostomy , jejunostomy tube in place. No evidence of recurrence. No bowel obstruction. \par 3. No pulmonary nodule, focal consolidation, pleural effusion, or pneumothorax. No thoracic lymphadenopathy. \par 4. No abdominopelvic lymphadenopathy. \par \par 2/24/21 MRI Abdomen\par 1.Gastric adenocarcinoma s/p total gastrectomy with postsurgical appearance. No suspicious nodularity/enhancement at the surgical sites to suggest local recurrence. \par 2. Since the prior to CT dated 11/3/20 there has been interval increased atrophy of the left lateral segment, with  lack of uptake notes on hepatobiliary phase imaging  in a geographic distribution, suggestive of post radiation treatment changes. There is associated diminished caliber of the left portal vein and occluded left hepatic vein. Findings are most likely related to post radiation treatment changes of the left lateral segment. Infiltrative metastasis is considered much less likely. Recommend a 3 months f/u\par 3.Interval development of a new discrete 2 cm segment 6/7 hepatic lesion since the prior to CT AP dated 11/3/20 suspicious for viable hepatic metastasis. Additional indeterminate 0.3cm segment 5 hepatic lesion for which hepatic metastasis also remains on the differential diagnosis. \par \par 3/23/21\par CT guided liver biopsy\par -Adenocarcinoma, moderately-poorly differentiated \par positive for CK7, negative for CK20, CDX-2 Hepar-1\par This pattern of staining supports the diagnosis, but is not specific in regards to site of origin. Given  patient's history of gastric cancer, this morphology and immunophenotype would be compatible with a metastasis from that site.  \par \par 5/27/21 CT AP at [facility]\par 2.1 cm x 1.9 cm right hepatic mass previously 1.3x0.9cm. 1.1cm hypodense lesion right hepatic lobe not seen previously. Previously seen 8.4cmx7.2cm ill defined hypodensity throughout the left hepatic lobe is no longer seen. Imp: right hepatic mass increased in size. New small right hepatic lesion suspicious metastasis\par \par 07/13/21 MR Abdomen w/wo IV Contrast ([facility])\par Progressive atrophy of the left lobe of the liver and metastatic lesion with hypoenhancing defect on hepatobiliary phase imaging measuring approximately 4.2 x 3.2 cm, decreased in size from February CT where it measured 8.4 x 7.2 cm and from May CT where it measured approximately 4.3 x 4.1 cm. Hypoenhancing segment 6/7 lesion measuring 2.1 cm on prior CT scan measures 1.4 cm on the current exam. Segment 6 lesion is not readily seen on current study. \par \par No new liver metastasis are identified. MRI Findings of hepatic iron deposition. Hemochromatosis protocol by be performed as needed. Asymmetric left hemidiaphragm elevation as above.  [de-identified] : Patient presents to clinic for f/u.  He lost his mother and sister recently, looks depressed.  Continue to have ongoing peripheral neuropathy and takes Gabapentin PRN once daily with some help. Weight is stable. Denies any pain, SOB, chest pain, V/N/C/D, skin rash, or fever. \par \par

## 2021-08-05 LAB
CEA SERPL-MCNC: 9.6 NG/ML
TSH SERPL-ACNC: 0.68 UIU/ML

## 2021-08-13 ENCOUNTER — APPOINTMENT (OUTPATIENT)
Dept: NEUROLOGY | Facility: CLINIC | Age: 66
End: 2021-08-13
Payer: MEDICAID

## 2021-08-13 VITALS
HEIGHT: 70 IN | BODY MASS INDEX: 18.47 KG/M2 | OXYGEN SATURATION: 95 % | WEIGHT: 129 LBS | SYSTOLIC BLOOD PRESSURE: 96 MMHG | DIASTOLIC BLOOD PRESSURE: 61 MMHG | HEART RATE: 72 BPM | TEMPERATURE: 98.4 F

## 2021-08-13 PROCEDURE — 99204 OFFICE O/P NEW MOD 45 MIN: CPT

## 2021-08-13 NOTE — HISTORY OF PRESENT ILLNESS
[FreeTextEntry1] : Referred by Dr. Ramirez for numbness and burning sensation in hands and feet\par Symptoms started years ago, but worse after chemotherapy started about 1 year ago \par He was prescribed gabapentin, which helped "sometimes" but stopped it due to confusion and fatigue. However after stopping gabapentin confusion did not get completely better - feels he has trouble concentrating. \par Gabapentin has helped him with sleeping - without it he wakes up at night. \par \par Reviewed:\par Notes from heme/onc\par Labs - B12 > 2000, TSH 1.19, CMP unremarkable\par

## 2021-08-13 NOTE — PHYSICAL EXAM
[FreeTextEntry1] : Gen: appears well, well-nourished, no acute distress\par \par MS: awake, alert, oriented, speech fluent, comprehension intact, good fund of knowledge, recent and remote memory intact, attention intact\par \par CN: PERRL, EOMI, visual fields full, facial strength and sensation intact and symmetric, palate elevation symmetric, tongue midline, no tongue atrophy or fasciculations\par \par Motor: normal bulk and tone, 5/5 strength throughout, no abnormal movements\par \par Sensory: vibration severely reduced at knees, absent at toes and ankles; cold sensation diminished up to knees b/l \par \par Reflexes: 1+ triceps b/l, otherwise absent \par \par Coordination: no dysmetria on finger to nose, Romberg negative\par \par Gait: normal

## 2021-08-13 NOTE — CONSULT LETTER
[Dear  ___] : Dear  [unfilled], [Consult Letter:] : I had the pleasure of evaluating your patient, [unfilled]. [Please see my note below.] : Please see my note below. [Consult Closing:] : Thank you very much for allowing me to participate in the care of this patient.  If you have any questions, please do not hesitate to contact me. [Sincerely,] : Sincerely, [FreeTextEntry3] : Bonifacio Tavarez M.D.\par Neurology, Electromyography and Neuromuscular Medicine\par [facility]\par \par  of Neurology\par [facility] / [employer]

## 2021-08-16 ENCOUNTER — APPOINTMENT (OUTPATIENT)
Dept: HEMATOLOGY ONCOLOGY | Facility: CLINIC | Age: 66
End: 2021-08-16

## 2021-08-16 LAB
FOLATE SERPL-MCNC: 10.9 NG/ML
HCYS SERPL-MCNC: 15.5 UMOL/L

## 2021-08-19 LAB
PARANEOPLASTIC AB PNL SER: NORMAL
VIT B1 SERPL-MCNC: 94 NMOL/L

## 2021-08-23 ENCOUNTER — NON-APPOINTMENT (OUTPATIENT)
Age: 66
End: 2021-08-23

## 2021-08-23 LAB — VIT B6 SERPL-MCNC: 16 UG/L

## 2021-08-25 ENCOUNTER — LABORATORY RESULT (OUTPATIENT)
Age: 66
End: 2021-08-25

## 2021-08-25 ENCOUNTER — APPOINTMENT (OUTPATIENT)
Dept: HEMATOLOGY ONCOLOGY | Facility: CLINIC | Age: 66
End: 2021-08-25
Payer: MEDICAID

## 2021-08-25 VITALS
WEIGHT: 127 LBS | DIASTOLIC BLOOD PRESSURE: 64 MMHG | SYSTOLIC BLOOD PRESSURE: 100 MMHG | RESPIRATION RATE: 18 BRPM | HEART RATE: 72 BPM | OXYGEN SATURATION: 99 % | HEIGHT: 70 IN | BODY MASS INDEX: 18.18 KG/M2

## 2021-08-25 DIAGNOSIS — R63.4 ABNORMAL WEIGHT LOSS: ICD-10-CM

## 2021-08-25 PROCEDURE — 99214 OFFICE O/P EST MOD 30 MIN: CPT

## 2021-08-25 RX ORDER — ALPRAZOLAM 0.25 MG/1
0.25 TABLET ORAL
Qty: 15 | Refills: 0 | Status: DISCONTINUED | COMMUNITY
Start: 2021-04-21 | End: 2021-08-25

## 2021-08-25 NOTE — ASSESSMENT
[FreeTextEntry1] : Mr. Murry is 65yr M with Stage III(ypT4b,pN2,cMo AJCC 8th ed) poorly  differentiated gastric cancer of the body and antrum with MSI-H, Her2 -ve, KRAS WT, PD- L1 expression with CPS 5, s/p 8 cycles of FLOT (4/16/20-8/12/20), Total gastrectomy with esophagojejunostomy with enblock resection of segment 2/3, periportal and D2 lymphadenectomy, feeding jejunostomy, complete omentectomy,oversewing of duodenum (9/14/20), (Xeloda)RT(12/14/20). Now with recurrent dz,isolated biopsy proven liver met segment 6. Started C1 Pembrolizumab with Dr. Mendoza at [place] on 4/20/21. He went to the ED at [facility] for pain and leakage from PEG tube on 5/27 and repeated CT AP with mixed response and PEG tube was removed after that. S/P C6 Pembrolizumab on 8/12/21. He gets Pembro at [facility] due to proximity to his home. Tolerating Tx well, symptomatically improved and CEA has been trending down. MR Abdomen (8/9/21) showing decreased in left and right lobe mets. Patient here for f/u after C6. \par \par 9/14/20\par surgical pathology report\par A. Omentum, Omentectomy: Benign omental fibroadipose tissue with chronic inflammation and reactive mesothelium\par B Lymph node, common bile duct , excision: One benign lymph node(0/1)\par C. Lymph node, Hepatic artery , excision: Metastatic adenocarcinoma involving three of three lymph node(3/3)\par D. Lymph node, portal vein excision: One benign lymph node(0/1)\par E. Stomach and Liber, Total gastrectomy and partial hepatectomy \par - Invasive poorly differentiated adenocarcinoma, measuring 8.0cm, predominantly involving gastric body and antrum\par -Tumor invades through entire thickness of gastric wall and into liver parenchyma. \par -All surgical margins( proximal ,distal, radial and liver resection margins) are free of tumor.\par - Focal lymphovascular space invasion present \par - No peripheral invasion seen\par -Metastatic adenocarcinoma involving one of twenty five lymph nodes(1/25)\par -Metastatic focus in lymph node measures 2.2cm\par -Background stomach shows intestinal metaplasia\par -AJCC 8th ed pathologic tumor stage: [id number]\par F.Lymph node, left gastric , excision:  One benign lymph node(0/1)\par G .Lymph node, Infrapancreatic, excision: Metastatic adenocarcinoma involving one of one lymph node(1/1) \par \par 2/3/21 CT CAP At [facility]\par 1.Interval development of left hepatic vein thrombosis as well as a large geographic region of hypodensity throughout the left hepatic lobe measuring 8.4x7.2x7.8cm. Finding could represent a perfusional anomaly related to left hepatic vein occlusion. However, a large metastatic lesion with associated left hepatic vein thrombosis is not excluded. Additionally, there is a 1.3am hypodensity in hepatic seg 6/7, which is also new and raises concern for a small metastatic focus. \par 2.Stable postsurgical changes of total gastrectomy and esophagojejunostomy and jejunojejunostomy , jejunostomy tube in place. No evidence of recurrence. No bowel obstruction. \par 3. No pulmonary nodule, focal consolidation, pleural effusion, or pneumothorax. No thoracic lymphadenopathy. \par 4. No abdominopelvic lymphadenopathy. \par \par 2/24/21 MRI Abdomen\par 1.Gastric adenocarcinoma s/p total gastrectomy with postsurgical appearance. No suspicious nodularity/enhancement at the surgical sites to suggest local recurrence. \par 2. Since the prior to CT dated 11/3/20 there has been interval increased atrophy of the left lateral segment, with  lack of uptake notes on hepatobiliary phase imaging  in a geographic distribution, suggestive of post radiation treatment changes. There is associated diminished caliber of the left portal vein and occluded left hepatic vein. Findings are most likely related to post radiation treatment changes of the left lateral segment. Infiltrative metastasis is considered much less likely. Recommend a 3 months f/u\par 3.Interval development of a new discrete 2 cm segment 6/7 hepatic lesion since the prior to CT AP dated 11/3/20 suspicious for viable hepatic metastasis. Additional indeterminate 0.3cm segment 5 hepatic lesion for which hepatic metastasis also remains on the differential diagnosis. \par \par 3/23/21\par CT guided liver biopsy\par -Adenocarcinoma, moderately-poorly differentiated \par positive for CK7, negative for CK20, CDX-2 Hepar-1\par This pattern of staining supports the diagnosis, but is not specific in regards to site of origin. Given  patient's history of gastric cancer, this morphology and immunophenotype would be compatible with a metastasis from that site.  \par \par 5/27/21 CT AP at [facility]\par 2.1 cm x 1.9 cm right hepatic mass previously 1.3x0.9cm. 1.1cm hypodense lesion right hepatic lobe not seen previously. Previously seen 8.4cmx7.2cm ill defined hypodensity throughout the left hepatic lobe is no longer seen. Imp: right hepatic mass increased in size. New small right hepatic lesion suspicious metastasis.\par \par 07/13/21 MR Abdomen w/wo IV Contrast ([facility])\par Progressive atrophy of the left lobe of the liver and metastatic lesion with hypoenhancing defect on hepatobiliary phase imaging measuring approximately 4.2 x 3.2 cm, decreased in size from February CT where it measured 8.4 x 7.2 cm and from May CT where it measured approximately 4.3 x 4.1 cm. Hypoenhancing segment 6/7 lesion measuring 2.1 cm on prior CT scan measures 1.4 cm on the current exam. Segment 6 lesion is not readily seen on current study. \par No new liver metastasis are identified. MRI Findings of hepatic iron deposition. Hemochromatosis protocol by be performed as needed. Asymmetric left hemidiaphragm elevation as above. \par \par ct DNA not detected (7/6/21)\par \par 8/9/21 MRI Abdomen\par 1. Minimal interval decreased size left hepatic lobe hypoenhancing lesion now measuring 3.9cm, previously measuring 4.2cm\par 2. Interval decreased size right hepatic lobe segment 6/7 lesion measuring 1.0 cm previously measuring 1.4cm\par 3. No significant change right hepatic lobe segment 6 lesion ,measuring 0.7cm\par 4. No signs of hepatic iron deposition. \par  \par Plan\par # Stage III(ypT4b,pN2,cMo AJCC 8th ed) MSI-H, Her2 -ve, KRAS-ve, PD- L1 expression CPS 5  Gastric cancer, now with recurrent dz with liver mets\par -s/p C8 FLOT, Total gastrectomy with esophagojejunostomy, Xeloda/RT and then noted to have local and hep recurrence\par -Started C1 Pembrolizumab with Dr. Mendoza at [facility] on 4/20/21. CT AP on 5/27 with mixed response. MR Abdomen/Pelvis on 8/9/21 showing decrease in size of liver mets. \par - Received C6 Pembro on 8/12/21. C7 will be on 9/2\par - Saw surgeon at [facility] and not candidate for surgery  at this time. would continue treatment for 2- 3 months and reevaluate. \par - SW f/u\par - Will repeat CBC, CMP, CEA today. Recent CEA on 8/4/21 was 9.6,  42.3 (06/14/21) and 253 (05/05/21). \par -Given clinical benefit (no longer requires PEG feeds), decreasing CEA and disappearance of large liver lesion, continue with Pembrolizumab. If CEA increases and increased lesions, will add ipilimumab. \par -ct DNA not detected (7/6/21) will repeat it in 3 months\par \par # Weight loss/ Malnutrition/ s/p PEG tube removal\par - Nutrition f/u \par \par # Peripheral neuropathy/confusion \par - Continue gabapentin as per Dr. Tavarez. \par \par # ADAN\par -s/p Injectafer x2 (5/26,6/2)\par \par RTC in 3 weeks

## 2021-08-25 NOTE — PHYSICAL EXAM
[Restricted in physically strenuous activity but ambulatory and able to carry out work of a light or sedentary nature] : Status 1- Restricted in physically strenuous activity but ambulatory and able to carry out work of a light or sedentary nature, e.g., light house work, office work [Thin] : thin [Normal] : affect appropriate [de-identified] : s/p g- tube removal

## 2021-08-25 NOTE — HISTORY OF PRESENT ILLNESS
[de-identified] : Mr. Murry is 65yr M with Stage III(ypT4b,pN2,cMo AJCC 8th ed) poorly  differentiated gastric cancer of the body and antrum with MSI-H, Her2 -ve, KRAS WT, PD- L1 expression with CPS 5, s/p 8 cycles of FLOT (4/16/20-8/12/20), Total gastrectomy with esophagojejunostomy with enblock resection of segment 2/3, periportal and D2 lymphadenectomy, feeding jejunostomy, complete omentectomy,oversewing of duodenum (9/14/20), (Xeloda)RT(12/14/20). Now with recurrent dz,isolated biopsy proven liver met segment 6. Started C1 Pembrolizumab with Dr. Mendoza at [place] on 4/20/21. He went to the ED at [facility] for pain and leakage from PEG tube on 5/27 and repeated CT AP with mixed response and PEG tube was removed after that. s/p C6 Pembrolizumab on 8/12/21. Tolerating Tx well, symptomatically improved and CEA has been trending down. MR Abdomen (8/9/21) showing decreased in left and right lobe mets. Patient here for follow-up after C6 on 8/12/21\par \par He was diagnosed with  Stage III(ypT4b,pN2,cMo) poorly  differentiated gastric cancer of the body and antrum, Her2 negative. He completed 8 cycles of FLOT chemotherapy 4/16/20-8/12/20  then had total gastrectomy with esophagojejunostomy with enblock resection of segment 2/3, periportal and D2 lymphadenectomy, feeding jejunostomy, complete omentectomy,oversewing of duodenum on 9/14/20 and completed post-op chemo(Xeloda)RT 12/14/20 at [facility]. Surveillance CT AP on 2/3/21 showed interval development of left hepatic vein thrombosis as well as a large geographic region of hypodensity throughout the left hepatic lobe measuring 8.4x7.2x7.8 cm . He underwent liver biopsy on 3/23/21, path confirmed moderately differentiated adenocarcinoma, positive for CK7, negative for CK20, CDX-2 , Hepar-1.  His oncologist at [facility] is suggesting he has Immunotherapy. He has sought second opinion from [facility] and is now here for a third opinion. He is reluctant to offer in depth information regarding Phx.\par \par 9/14/20\par surgical pathology report\par A. Omentum, Omentectomy: Benign omental fibroadipose tissue with chronic inflammation and reactive mesothelium\par B Lymph node, common bile duct , excision: One benign lymph node(0/1)\par C. Lymph node, Hepatic artery , excision: Metastatic adenocarcinoma involving three of three lymph node(3/3)\par D. Lymph node, portal vein excision: One benign lymph node(0/1)\par E. Stomach and Liber, Total gastrectomy and partial hepatectomy \par - Invasive poorly differentiated adenocarcinoma, measuring 8.0cm, predominantly involving gastric body and antrum\par -Tumor invades through entire thickness of gastric wall and into liver parenchyma. \par -All surgical margins( proximal ,distal, radial and liver resection margins) are free of tumor.\par - Focal lymphovascular space invasion present \par - No peripheral invasion seen\par -Metastatic adenocarcinoma involving one of twenty five lymph nodes(1/25)\par -Metastatic focus in lymph node measures 2.2cm\par -Background stomach shows intestinal metaplasia\par -AJCC 8th ed pathologic tumor stage: [id number]\par F.Lymph node, left gastric , excision:  One benign lymph node(0/1)\par G .Lymph node, Infrapancreatic, excision: Metastatic adenocarcinoma involving one of one lymph node(1/1) \par \par 2/3/21 CT CAP At [facility]\par 1.Interval development of left hepatic vein thrombosis as well as a large geographic region of hypodensity throughout the left hepatic lobe measuring 8.4x7.2x7.8cm. Finding could represent a perfusional anomaly related to left hepatic vein occlusion. However, a large metastatic lesion with associated left hepatic vein thrombosis is not excluded. Additionally, there is a 1.3am hypodensity in hepatic seg 6/7, which is also new and raises concern for a small metastatic focus. \par 2.Stable postsurgical changes of total gastrectomy and esophagojejunostomy and jejunojejunostomy , jejunostomy tube in place. No evidence of recurrence. No bowel obstruction. \par 3. No pulmonary nodule, focal consolidation, pleural effusion, or pneumothorax. No thoracic lymphadenopathy. \par 4. No abdominopelvic lymphadenopathy. \par \par 2/24/21 MRI Abdomen\par 1.Gastric adenocarcinoma s/p total gastrectomy with postsurgical appearance. No suspicious nodularity/enhancement at the surgical sites to suggest local recurrence. \par 2. Since the prior to CT dated 11/3/20 there has been interval increased atrophy of the left lateral segment, with  lack of uptake notes on hepatobiliary phase imaging  in a geographic distribution, suggestive of post radiation treatment changes. There is associated diminished caliber of the left portal vein and occluded left hepatic vein. Findings are most likely related to post radiation treatment changes of the left lateral segment. Infiltrative metastasis is considered much less likely. Recommend a 3 months f/u\par 3.Interval development of a new discrete 2 cm segment 6/7 hepatic lesion since the prior to CT AP dated 11/3/20 suspicious for viable hepatic metastasis. Additional indeterminate 0.3cm segment 5 hepatic lesion for which hepatic metastasis also remains on the differential diagnosis. \par \par 3/23/21\par CT guided liver biopsy\par -Adenocarcinoma, moderately-poorly differentiated \par positive for CK7, negative for CK20, CDX-2 Hepar-1\par This pattern of staining supports the diagnosis, but is not specific in regards to site of origin. Given  patient's history of gastric cancer, this morphology and immunophenotype would be compatible with a metastasis from that site.  \par \par 5/27/21 CT AP at [facility]\par 2.1 cm x 1.9 cm right hepatic mass previously 1.3x0.9cm. 1.1cm hypodense lesion right hepatic lobe not seen previously. Previously seen 8.4cmx7.2cm ill defined hypodensity throughout the left hepatic lobe is no longer seen. Imp: right hepatic mass increased in size. New small right hepatic lesion suspicious metastasis\par \par 07/13/21 MR Abdomen w/wo IV Contrast ([facility])\par Progressive atrophy of the left lobe of the liver and metastatic lesion with hypoenhancing defect on hepatobiliary phase imaging measuring approximately 4.2 x 3.2 cm, decreased in size from February CT where it measured 8.4 x 7.2 cm and from May CT where it measured approximately 4.3 x 4.1 cm. Hypoenhancing segment 6/7 lesion measuring 2.1 cm on prior CT scan measures 1.4 cm on the current exam. Segment 6 lesion is not readily seen on current study. \par No new liver metastasis are identified. MRI Findings of hepatic iron deposition. Hemochromatosis protocol by be performed as needed. Asymmetric left hemidiaphragm elevation as above. \par \par ct DNA not detected (7/6/21)\par \par 8/9/21 MRI Abdomen\par 1. Minimal interval decreased size left hepatic lobe hypoenhancing lesion now measuring 3.9cm, previously measuring 4.2cm\par 2. Interval decreased size right hepatic lobe segment 6/7 lesion measuring 1.0 cm previously measuring 1.4cm\par 3. No significant change right hepatic lobe segment 6 lesion ,measuring 0.7cm\par 4. No signs of hepatic iron deposition. \par   [de-identified] : Patient presents to clinic for f/u after C6. He saw Dr. Tavarez and was recommended to continue on Gabapentin 500mg TID  Continue to have ongoing peripheral neuropathy. Lost 2 lbs since last visit but states appetite is fine. Denies any pain, SOB, chest pain, V/N/C/D, skin rash, or fever. \par \par

## 2021-08-25 NOTE — REVIEW OF SYSTEMS
[Fatigue] : fatigue [Recent Change In Weight] : ~T recent weight change [Fever] : no fever [Chills] : no chills [Night Sweats] : no night sweats [Eye Pain] : no eye pain [Dysphagia] : no dysphagia [Odynophagia] : no odynophagia [Chest Pain] : no chest pain [Lower Ext Edema] : no lower extremity edema [Shortness Of Breath] : no shortness of breath [Wheezing] : no wheezing [Cough] : no cough [Abdominal Pain] : no abdominal pain [Vomiting] : no vomiting [Diarrhea] : no diarrhea [Constipation] : no constipation [Dysuria] : no dysuria [Muscle Weakness] : no muscle weakness [Confused] : no confusion [Dizziness] : no dizziness [Difficulty Walking] : no difficulty walking [FreeTextEntry7] : PEG tube removed [de-identified] : chronic tingling to fingers/toes, burning in body,

## 2021-08-27 LAB — CEA SERPL-MCNC: 7 NG/ML

## 2021-09-09 ENCOUNTER — NON-APPOINTMENT (OUTPATIENT)
Age: 66
End: 2021-09-09

## 2021-09-14 ENCOUNTER — LABORATORY RESULT (OUTPATIENT)
Age: 66
End: 2021-09-14

## 2021-09-14 ENCOUNTER — APPOINTMENT (OUTPATIENT)
Dept: HEMATOLOGY ONCOLOGY | Facility: CLINIC | Age: 66
End: 2021-09-14
Payer: MEDICAID

## 2021-09-14 VITALS
OXYGEN SATURATION: 100 % | WEIGHT: 128 LBS | HEIGHT: 70 IN | BODY MASS INDEX: 18.32 KG/M2 | TEMPERATURE: 98.2 F | RESPIRATION RATE: 18 BRPM | DIASTOLIC BLOOD PRESSURE: 70 MMHG | SYSTOLIC BLOOD PRESSURE: 108 MMHG | HEART RATE: 60 BPM

## 2021-09-14 PROBLEM — G47.00 INSOMNIA: Status: ACTIVE | Noted: 2021-04-21

## 2021-09-14 PROCEDURE — 99214 OFFICE O/P EST MOD 30 MIN: CPT

## 2021-09-14 NOTE — HISTORY OF PRESENT ILLNESS
[de-identified] : Mr. Murry is 65yr M with Stage III(ypT4b,pN2,cMo AJCC 8th ed) poorly  differentiated gastric cancer of the body and antrum with MSI-H, Her2 -ve, KRAS WT, PD- L1 expression with CPS 5, s/p 8 cycles of FLOT (4/16/20-8/12/20), Total gastrectomy with esophagojejunostomy with enblock resection of segment 2/3, periportal and D2 lymphadenectomy, feeding jejunostomy, complete omentectomy,oversewing of duodenum (9/14/20), (Xeloda)RT(12/14/20). Now with recurrent dz,isolated biopsy proven liver met segment 6. Started C1 Pembrolizumab with Dr. Mendoza at [place] on 4/20/21. He went to the ED at [facility] for pain and leakage from PEG tube on 5/27 and repeated CT AP with mixed response and PEG tube was removed after that. s/p C6 Pembrolizumab on 8/12/21. Tolerating Tx well, symptomatically improved and CEA has been trending down. MR Abdomen (8/9/21) showing decreased in left and right lobe mets. Patient here for follow-up after C7 on 9/2/21.\par \par He was diagnosed with  Stage III(ypT4b,pN2,cMo) poorly  differentiated gastric cancer of the body and antrum, Her2 negative. He completed 8 cycles of FLOT chemotherapy 4/16/20-8/12/20  then had total gastrectomy with esophagojejunostomy with enblock resection of segment 2/3, periportal and D2 lymphadenectomy, feeding jejunostomy, complete omentectomy,oversewing of duodenum on 9/14/20 and completed post-op chemo(Xeloda)RT 12/14/20 at [facility]. Surveillance CT AP on 2/3/21 showed interval development of left hepatic vein thrombosis as well as a large geographic region of hypodensity throughout the left hepatic lobe measuring 8.4x7.2x7.8 cm . He underwent liver biopsy on 3/23/21, path confirmed moderately differentiated adenocarcinoma, positive for CK7, negative for CK20, CDX-2 , Hepar-1.  His oncologist at [facility] is suggesting he has Immunotherapy. He has sought second opinion from [facility] and is now here for a third opinion. He is reluctant to offer in depth information regarding Phx.\par \par 9/14/20\par surgical pathology report\par A. Omentum, Omentectomy: Benign omental fibroadipose tissue with chronic inflammation and reactive mesothelium\par B Lymph node, common bile duct , excision: One benign lymph node(0/1)\par C. Lymph node, Hepatic artery , excision: Metastatic adenocarcinoma involving three of three lymph node(3/3)\par D. Lymph node, portal vein excision: One benign lymph node(0/1)\par E. Stomach and Liber, Total gastrectomy and partial hepatectomy \par - Invasive poorly differentiated adenocarcinoma, measuring 8.0cm, predominantly involving gastric body and antrum\par -Tumor invades through entire thickness of gastric wall and into liver parenchyma. \par -All surgical margins( proximal ,distal, radial and liver resection margins) are free of tumor.\par - Focal lymphovascular space invasion present \par - No peripheral invasion seen\par -Metastatic adenocarcinoma involving one of twenty five lymph nodes(1/25)\par -Metastatic focus in lymph node measures 2.2cm\par -Background stomach shows intestinal metaplasia\par -AJCC 8th ed pathologic tumor stage: [id number]\par F.Lymph node, left gastric , excision:  One benign lymph node(0/1)\par G .Lymph node, Infrapancreatic, excision: Metastatic adenocarcinoma involving one of one lymph node(1/1) \par \par 2/3/21 CT CAP At [facility]\par 1.Interval development of left hepatic vein thrombosis as well as a large geographic region of hypodensity throughout the left hepatic lobe measuring 8.4x7.2x7.8cm. Finding could represent a perfusional anomaly related to left hepatic vein occlusion. However, a large metastatic lesion with associated left hepatic vein thrombosis is not excluded. Additionally, there is a 1.3am hypodensity in hepatic seg 6/7, which is also new and raises concern for a small metastatic focus. \par 2.Stable postsurgical changes of total gastrectomy and esophagojejunostomy and jejunojejunostomy , jejunostomy tube in place. No evidence of recurrence. No bowel obstruction. \par 3. No pulmonary nodule, focal consolidation, pleural effusion, or pneumothorax. No thoracic lymphadenopathy. \par 4. No abdominopelvic lymphadenopathy. \par \par 2/24/21 MRI Abdomen\par 1.Gastric adenocarcinoma s/p total gastrectomy with postsurgical appearance. No suspicious nodularity/enhancement at the surgical sites to suggest local recurrence. \par 2. Since the prior to CT dated 11/3/20 there has been interval increased atrophy of the left lateral segment, with  lack of uptake notes on hepatobiliary phase imaging  in a geographic distribution, suggestive of post radiation treatment changes. There is associated diminished caliber of the left portal vein and occluded left hepatic vein. Findings are most likely related to post radiation treatment changes of the left lateral segment. Infiltrative metastasis is considered much less likely. Recommend a 3 months f/u\par 3.Interval development of a new discrete 2 cm segment 6/7 hepatic lesion since the prior to CT AP dated 11/3/20 suspicious for viable hepatic metastasis. Additional indeterminate 0.3cm segment 5 hepatic lesion for which hepatic metastasis also remains on the differential diagnosis. \par \par 3/23/21\par CT guided liver biopsy\par -Adenocarcinoma, moderately-poorly differentiated \par positive for CK7, negative for CK20, CDX-2 Hepar-1\par This pattern of staining supports the diagnosis, but is not specific in regards to site of origin. Given  patient's history of gastric cancer, this morphology and immunophenotype would be compatible with a metastasis from that site.  \par \par 5/27/21 CT AP at [facility]\par 2.1 cm x 1.9 cm right hepatic mass previously 1.3x0.9cm. 1.1cm hypodense lesion right hepatic lobe not seen previously. Previously seen 8.4cmx7.2cm ill defined hypodensity throughout the left hepatic lobe is no longer seen. Imp: right hepatic mass increased in size. New small right hepatic lesion suspicious metastasis\par \par 07/13/21 MR Abdomen w/wo IV Contrast ([facility])\par Progressive atrophy of the left lobe of the liver and metastatic lesion with hypoenhancing defect on hepatobiliary phase imaging measuring approximately 4.2 x 3.2 cm, decreased in size from February CT where it measured 8.4 x 7.2 cm and from May CT where it measured approximately 4.3 x 4.1 cm. Hypoenhancing segment 6/7 lesion measuring 2.1 cm on prior CT scan measures 1.4 cm on the current exam. Segment 6 lesion is not readily seen on current study. \par No new liver metastasis are identified. MRI Findings of hepatic iron deposition. Hemochromatosis protocol by be performed as needed. Asymmetric left hemidiaphragm elevation as above. \par \par ct DNA not detected (7/6/21)\par \par 8/9/21 MRI Abdomen\par 1. Minimal interval decreased size left hepatic lobe hypoenhancing lesion now measuring 3.9cm, previously measuring 4.2cm\par 2. Interval decreased size right hepatic lobe segment 6/7 lesion measuring 1.0 cm previously measuring 1.4cm\par 3. No significant change right hepatic lobe segment 6 lesion ,measuring 0.7cm\par 4. No signs of hepatic iron deposition. \par   [de-identified] : Patient presents to clinic for f/u after C7. States appetite is fine and he feels well. Denies any pain, SOB, chest pain, V/N/C/D, skin rash, or fever. \par \par

## 2021-09-14 NOTE — PHYSICAL EXAM
[Restricted in physically strenuous activity but ambulatory and able to carry out work of a light or sedentary nature] : Status 1- Restricted in physically strenuous activity but ambulatory and able to carry out work of a light or sedentary nature, e.g., light house work, office work [Normal] : affect appropriate [Thin] : thin [de-identified] : s/p g- tube removal

## 2021-09-14 NOTE — REVIEW OF SYSTEMS
[Recent Change In Weight] : ~T recent weight change [Fatigue] : fatigue [Fever] : no fever [Chills] : no chills [Night Sweats] : no night sweats [Eye Pain] : no eye pain [Dysphagia] : no dysphagia [Odynophagia] : no odynophagia [Chest Pain] : no chest pain [Lower Ext Edema] : no lower extremity edema [Shortness Of Breath] : no shortness of breath [Wheezing] : no wheezing [Cough] : no cough [Abdominal Pain] : no abdominal pain [Vomiting] : no vomiting [Constipation] : no constipation [Diarrhea] : no diarrhea [Dysuria] : no dysuria [Muscle Weakness] : no muscle weakness [Confused] : no confusion [Dizziness] : no dizziness [Difficulty Walking] : no difficulty walking [FreeTextEntry2] : when asked how much weight loss, pt states "a lot" [FreeTextEntry7] : PEG tube removed [de-identified] : chronic tingling to fingers/toes, burning in body,

## 2021-09-14 NOTE — ASSESSMENT
[FreeTextEntry1] : Mr. Murry is 65yr M with Stage III(ypT4b,pN2,cMo AJCC 8th ed) poorly  differentiated gastric cancer of the body and antrum with MSI-H, Her2 -ve, KRAS WT, PD- L1 expression with CPS 5, s/p 8 cycles of FLOT (4/16/20-8/12/20), Total gastrectomy with esophagojejunostomy with enblock resection of segment 2/3, periportal and D2 lymphadenectomy, feeding jejunostomy, complete omentectomy,oversewing of duodenum (9/14/20), (Xeloda)RT(12/14/20). Now with recurrent dz,isolated biopsy proven liver met segment 6. Started C1 Pembrolizumab with Dr. Mendoza at [place] on 4/20/21. He went to the ED at [facility] for pain and leakage from PEG tube on 5/27 and repeated CT AP with mixed response and PEG tube was removed after that. S/P C6 Pembrolizumab on 8/12/21. He gets Pembro at [facility] due to proximity to his home. Tolerating Tx well, symptomatically improved and CEA has been trending down. MR Abdomen (8/9/21) showing decreased in left and right lobe mets. Patient here for f/u after C7. \par \par 9/14/20\par surgical pathology report\par A. Omentum, Omentectomy: Benign omental fibroadipose tissue with chronic inflammation and reactive mesothelium\par B Lymph node, common bile duct , excision: One benign lymph node(0/1)\par C. Lymph node, Hepatic artery , excision: Metastatic adenocarcinoma involving three of three lymph node(3/3)\par D. Lymph node, portal vein excision: One benign lymph node(0/1)\par E. Stomach and Liber, Total gastrectomy and partial hepatectomy \par - Invasive poorly differentiated adenocarcinoma, measuring 8.0cm, predominantly involving gastric body and antrum\par -Tumor invades through entire thickness of gastric wall and into liver parenchyma. \par -All surgical margins( proximal ,distal, radial and liver resection margins) are free of tumor.\par - Focal lymphovascular space invasion present \par - No peripheral invasion seen\par -Metastatic adenocarcinoma involving one of twenty five lymph nodes(1/25)\par -Metastatic focus in lymph node measures 2.2cm\par -Background stomach shows intestinal metaplasia\par -AJCC 8th ed pathologic tumor stage: [id number]\par F.Lymph node, left gastric , excision:  One benign lymph node(0/1)\par G .Lymph node, Infrapancreatic, excision: Metastatic adenocarcinoma involving one of one lymph node(1/1) \par \par 2/3/21 CT CAP At [facility]\par 1.Interval development of left hepatic vein thrombosis as well as a large geographic region of hypodensity throughout the left hepatic lobe measuring 8.4x7.2x7.8cm. Finding could represent a perfusional anomaly related to left hepatic vein occlusion. However, a large metastatic lesion with associated left hepatic vein thrombosis is not excluded. Additionally, there is a 1.3am hypodensity in hepatic seg 6/7, which is also new and raises concern for a small metastatic focus. \par 2.Stable postsurgical changes of total gastrectomy and esophagojejunostomy and jejunojejunostomy , jejunostomy tube in place. No evidence of recurrence. No bowel obstruction. \par 3. No pulmonary nodule, focal consolidation, pleural effusion, or pneumothorax. No thoracic lymphadenopathy. \par 4. No abdominopelvic lymphadenopathy. \par \par 2/24/21 MRI Abdomen\par 1.Gastric adenocarcinoma s/p total gastrectomy with postsurgical appearance. No suspicious nodularity/enhancement at the surgical sites to suggest local recurrence. \par 2. Since the prior to CT dated 11/3/20 there has been interval increased atrophy of the left lateral segment, with  lack of uptake notes on hepatobiliary phase imaging  in a geographic distribution, suggestive of post radiation treatment changes. There is associated diminished caliber of the left portal vein and occluded left hepatic vein. Findings are most likely related to post radiation treatment changes of the left lateral segment. Infiltrative metastasis is considered much less likely. Recommend a 3 months f/u\par 3.Interval development of a new discrete 2 cm segment 6/7 hepatic lesion since the prior to CT AP dated 11/3/20 suspicious for viable hepatic metastasis. Additional indeterminate 0.3cm segment 5 hepatic lesion for which hepatic metastasis also remains on the differential diagnosis. \par \par 3/23/21\par CT guided liver biopsy\par -Adenocarcinoma, moderately-poorly differentiated \par positive for CK7, negative for CK20, CDX-2 Hepar-1\par This pattern of staining supports the diagnosis, but is not specific in regards to site of origin. Given  patient's history of gastric cancer, this morphology and immunophenotype would be compatible with a metastasis from that site.  \par \par 5/27/21 CT AP at [facility]\par 2.1 cm x 1.9 cm right hepatic mass previously 1.3x0.9cm. 1.1cm hypodense lesion right hepatic lobe not seen previously. Previously seen 8.4cmx7.2cm ill defined hypodensity throughout the left hepatic lobe is no longer seen. Imp: right hepatic mass increased in size. New small right hepatic lesion suspicious metastasis.\par \par 07/13/21 MR Abdomen w/wo IV Contrast ([facility])\par Progressive atrophy of the left lobe of the liver and metastatic lesion with hypoenhancing defect on hepatobiliary phase imaging measuring approximately 4.2 x 3.2 cm, decreased in size from February CT where it measured 8.4 x 7.2 cm and from May CT where it measured approximately 4.3 x 4.1 cm. Hypoenhancing segment 6/7 lesion measuring 2.1 cm on prior CT scan measures 1.4 cm on the current exam. Segment 6 lesion is not readily seen on current study. \par No new liver metastasis are identified. MRI Findings of hepatic iron deposition. Hemochromatosis protocol by be performed as needed. Asymmetric left hemidiaphragm elevation as above. \par \par ct DNA not detected (7/6/21)\par \par 8/9/21 MRI Abdomen\par 1. Minimal interval decreased size left hepatic lobe hypoenhancing lesion now measuring 3.9cm, previously measuring 4.2cm\par 2. Interval decreased size right hepatic lobe segment 6/7 lesion measuring 1.0 cm previously measuring 1.4cm\par 3. No significant change right hepatic lobe segment 6 lesion ,measuring 0.7cm\par 4. No signs of hepatic iron deposition. \par  \par Plan\par # Stage III(ypT4b,pN2,cMo AJCC 8th ed) MSI-H, Her2 -ve, KRAS-ve, PD- L1 expression CPS 5  Gastric cancer, now with recurrent dz with liver mets\par -s/p C8 FLOT, Total gastrectomy with esophagojejunostomy, Xeloda/RT and then noted to have local and hep recurrence\par -Started C1 Pembrolizumab with Dr. Mendoza at [place] on 4/20/21. CT AP on 5/27 with mixed response. MR Abdomen/Pelvis on 8/9/21 showing decrease in size of liver mets. \par - Received C7 Pembro on 9/2\par - Saw surgeon at [facility] and not candidate for surgery  at this time. would continue treatment for 2- 3 months and reevaluate. \par - SW f/u\par - Will repeat CBC, CMP, CEA today. Recent CEA on 8/25/21 was 7,  42.3 (06/14/21) and 253 (05/05/21). \par -Given clinical benefit (no longer requires PEG feeds), decreasing CEA and disappearance of large liver lesion, continue with Pembrolizumab. If CEA increases and increased lesions, will add ipilimumab. \par -ct DNA not detected (7/6/21) will repeat it in 3 months\par -check CBC, CMP, CEA, B12, fe studies and Vit D\par \par # Peripheral neuropathy/confusion \par - Continue gabapentin as per Dr. Tavarez. \par \par # ADAN\par -s/p Injectafer x2 (5/26,6/2)\par \par RTC in 3 weeks

## 2021-09-14 NOTE — REVIEW OF SYSTEMS
[FreeTextEntry2] : when asked how much weight loss, pt states "a lot" [Fever] : no fever [Chills] : no chills [Night Sweats] : no night sweats [Fatigue] : fatigue [Recent Change In Weight] : ~T recent weight change [Eye Pain] : no eye pain [Dysphagia] : no dysphagia [Odynophagia] : no odynophagia [Chest Pain] : no chest pain [Lower Ext Edema] : no lower extremity edema [Wheezing] : no wheezing [Shortness Of Breath] : no shortness of breath [Cough] : no cough [Abdominal Pain] : no abdominal pain [Vomiting] : no vomiting [Constipation] : no constipation [Dysuria] : no dysuria [Diarrhea] : no diarrhea [Muscle Weakness] : no muscle weakness [Confused] : no confusion [Dizziness] : no dizziness [Difficulty Walking] : no difficulty walking [FreeTextEntry7] : PEG tube removed [de-identified] : chronic tingling to fingers/toes, burning in body,

## 2021-09-14 NOTE — REVIEW OF SYSTEMS
[FreeTextEntry2] : when asked how much weight loss, pt states "a lot" [Fever] : no fever [Chills] : no chills [Night Sweats] : no night sweats [Fatigue] : fatigue [Recent Change In Weight] : ~T recent weight change [Eye Pain] : no eye pain [Dysphagia] : no dysphagia [Odynophagia] : no odynophagia [Chest Pain] : no chest pain [Lower Ext Edema] : no lower extremity edema [Wheezing] : no wheezing [Shortness Of Breath] : no shortness of breath [Cough] : no cough [Abdominal Pain] : no abdominal pain [Vomiting] : no vomiting [Constipation] : no constipation [Diarrhea] : no diarrhea [Dysuria] : no dysuria [Muscle Weakness] : no muscle weakness [Confused] : no confusion [Dizziness] : no dizziness [Difficulty Walking] : no difficulty walking [FreeTextEntry7] : PEG tube removed [de-identified] : chronic tingling to fingers/toes, burning in body,

## 2021-09-14 NOTE — PHYSICAL EXAM
[Restricted in physically strenuous activity but ambulatory and able to carry out work of a light or sedentary nature] : Status 1- Restricted in physically strenuous activity but ambulatory and able to carry out work of a light or sedentary nature, e.g., light house work, office work [Thin] : thin [Normal] : affect appropriate [de-identified] : s/p g- tube removal

## 2021-09-14 NOTE — ASSESSMENT
[FreeTextEntry1] : Mr. Murry is 65yr M with Stage III(ypT4b,pN2,cMo AJCC 8th ed) poorly  differentiated gastric cancer of the body and antrum with MSI-H, Her2 -ve, KRAS WT, PD- L1 expression with CPS 5, s/p 8 cycles of FLOT (4/16/20-8/12/20), Total gastrectomy with esophagojejunostomy with enblock resection of segment 2/3, periportal and D2 lymphadenectomy, feeding jejunostomy, complete omentectomy,oversewing of duodenum (9/14/20), (Xeloda)RT(12/14/20). Now with recurrent dz,isolated biopsy proven liver met segment 6. Started C1 Pembrolizumab with Dr. Mendoza at [place] on 4/20/21. He went to the ED at [facility] for pain and leakage from PEG tube on 5/27 and repeated CT AP with mixed response and PEG tube was removed after that. S/P C6 Pembrolizumab on 8/12/21. He gets Pembro at [facility] due to proximity to his home. Tolerating Tx well, symptomatically improved and CEA has been trending down. MR Abdomen (8/9/21) showing decreased in left and right lobe mets. Patient here for f/u after C7. \par \par 9/14/20\par surgical pathology report\par A. Omentum, Omentectomy: Benign omental fibroadipose tissue with chronic inflammation and reactive mesothelium\par B Lymph node, common bile duct , excision: One benign lymph node(0/1)\par C. Lymph node, Hepatic artery , excision: Metastatic adenocarcinoma involving three of three lymph node(3/3)\par D. Lymph node, portal vein excision: One benign lymph node(0/1)\par E. Stomach and Liber, Total gastrectomy and partial hepatectomy \par - Invasive poorly differentiated adenocarcinoma, measuring 8.0cm, predominantly involving gastric body and antrum\par -Tumor invades through entire thickness of gastric wall and into liver parenchyma. \par -All surgical margins( proximal ,distal, radial and liver resection margins) are free of tumor.\par - Focal lymphovascular space invasion present \par - No peripheral invasion seen\par -Metastatic adenocarcinoma involving one of twenty five lymph nodes(1/25)\par -Metastatic focus in lymph node measures 2.2cm\par -Background stomach shows intestinal metaplasia\par -AJCC 8th ed pathologic tumor stage: [id number]\par F.Lymph node, left gastric , excision:  One benign lymph node(0/1)\par G .Lymph node, Infrapancreatic, excision: Metastatic adenocarcinoma involving one of one lymph node(1/1) \par \par 2/3/21 CT CAP At [facility]\par 1.Interval development of left hepatic vein thrombosis as well as a large geographic region of hypodensity throughout the left hepatic lobe measuring 8.4x7.2x7.8cm. Finding could represent a perfusional anomaly related to left hepatic vein occlusion. However, a large metastatic lesion with associated left hepatic vein thrombosis is not excluded. Additionally, there is a 1.3am hypodensity in hepatic seg 6/7, which is also new and raises concern for a small metastatic focus. \par 2.Stable postsurgical changes of total gastrectomy and esophagojejunostomy and jejunojejunostomy , jejunostomy tube in place. No evidence of recurrence. No bowel obstruction. \par 3. No pulmonary nodule, focal consolidation, pleural effusion, or pneumothorax. No thoracic lymphadenopathy. \par 4. No abdominopelvic lymphadenopathy. \par \par 2/24/21 MRI Abdomen\par 1.Gastric adenocarcinoma s/p total gastrectomy with postsurgical appearance. No suspicious nodularity/enhancement at the surgical sites to suggest local recurrence. \par 2. Since the prior to CT dated 11/3/20 there has been interval increased atrophy of the left lateral segment, with  lack of uptake notes on hepatobiliary phase imaging  in a geographic distribution, suggestive of post radiation treatment changes. There is associated diminished caliber of the left portal vein and occluded left hepatic vein. Findings are most likely related to post radiation treatment changes of the left lateral segment. Infiltrative metastasis is considered much less likely. Recommend a 3 months f/u\par 3.Interval development of a new discrete 2 cm segment 6/7 hepatic lesion since the prior to CT AP dated 11/3/20 suspicious for viable hepatic metastasis. Additional indeterminate 0.3cm segment 5 hepatic lesion for which hepatic metastasis also remains on the differential diagnosis. \par \par 3/23/21\par CT guided liver biopsy\par -Adenocarcinoma, moderately-poorly differentiated \par positive for CK7, negative for CK20, CDX-2 Hepar-1\par This pattern of staining supports the diagnosis, but is not specific in regards to site of origin. Given  patient's history of gastric cancer, this morphology and immunophenotype would be compatible with a metastasis from that site.  \par \par 5/27/21 CT AP at [facility]\par 2.1 cm x 1.9 cm right hepatic mass previously 1.3x0.9cm. 1.1cm hypodense lesion right hepatic lobe not seen previously. Previously seen 8.4cmx7.2cm ill defined hypodensity throughout the left hepatic lobe is no longer seen. Imp: right hepatic mass increased in size. New small right hepatic lesion suspicious metastasis.\par \par 07/13/21 MR Abdomen w/wo IV Contrast ([facility])\par Progressive atrophy of the left lobe of the liver and metastatic lesion with hypoenhancing defect on hepatobiliary phase imaging measuring approximately 4.2 x 3.2 cm, decreased in size from February CT where it measured 8.4 x 7.2 cm and from May CT where it measured approximately 4.3 x 4.1 cm. Hypoenhancing segment 6/7 lesion measuring 2.1 cm on prior CT scan measures 1.4 cm on the current exam. Segment 6 lesion is not readily seen on current study. \par No new liver metastasis are identified. MRI Findings of hepatic iron deposition. Hemochromatosis protocol by be performed as needed. Asymmetric left hemidiaphragm elevation as above. \par \par ct DNA not detected (7/6/21)\par \par 8/9/21 MRI Abdomen\par 1. Minimal interval decreased size left hepatic lobe hypoenhancing lesion now measuring 3.9cm, previously measuring 4.2cm\par 2. Interval decreased size right hepatic lobe segment 6/7 lesion measuring 1.0 cm previously measuring 1.4cm\par 3. No significant change right hepatic lobe segment 6 lesion ,measuring 0.7cm\par 4. No signs of hepatic iron deposition. \par  \par Plan\par # Stage III(ypT4b,pN2,cMo AJCC 8th ed) MSI-H, Her2 -ve, KRAS-ve, PD- L1 expression CPS 5  Gastric cancer, now with recurrent dz with liver mets\par -s/p C8 FLOT, Total gastrectomy with esophagojejunostomy, Xeloda/RT and then noted to have local and hep recurrence\par -Started C1 Pembrolizumab with Dr. Mendoza at [place] on 4/20/21. CT AP on 5/27 with mixed response. MR Abdomen/Pelvis on 8/9/21 showing decrease in size of liver mets. \par - Received C7 Pembro on 9/2\par - Saw surgeon at [facility] and not candidate for surgery  at this time. would continue treatment for 2- 3 months and reevaluate. \par - SW f/u\par - Will repeat CBC, CMP, CEA today. Recent CEA on 8/25/21 was 7,  42.3 (06/14/21) and 253 (05/05/21). \par -Given clinical benefit (no longer requires PEG feeds), decreasing CEA and disappearance of large liver lesion, continue with Pembrolizumab. If CEA increases and increased lesions, will add ipilimumab. \par -ct DNA not detected (7/6/21) will repeat it in 3 months\par -check CBC, CMP, CEA, B12, fe studies and Vit D\par \par # Peripheral neuropathy/confusion \par - Continue gabapentin as per Dr. Tavarez. \par \par # ADAN\par -s/p Injectafer x2 (5/26,6/2)\par \par RTC in 3 weeks

## 2021-09-14 NOTE — HISTORY OF PRESENT ILLNESS
[de-identified] : Mr. Murry is 65yr M with Stage III(ypT4b,pN2,cMo AJCC 8th ed) poorly  differentiated gastric cancer of the body and antrum with MSI-H, Her2 -ve, KRAS WT, PD- L1 expression with CPS 5, s/p 8 cycles of FLOT (4/16/20-8/12/20), Total gastrectomy with esophagojejunostomy with enblock resection of segment 2/3, periportal and D2 lymphadenectomy, feeding jejunostomy, complete omentectomy,oversewing of duodenum (9/14/20), (Xeloda)RT(12/14/20). Now with recurrent dz,isolated biopsy proven liver met segment 6. Started C1 Pembrolizumab with Dr. Mendoza at [place] on 4/20/21. He went to the ED at [facility] for pain and leakage from PEG tube on 5/27 and repeated CT AP with mixed response and PEG tube was removed after that. s/p C6 Pembrolizumab on 8/12/21. Tolerating Tx well, symptomatically improved and CEA has been trending down. MR Abdomen (8/9/21) showing decreased in left and right lobe mets. Patient here for follow-up after C7 on 9/2/21.\par \par He was diagnosed with  Stage III(ypT4b,pN2,cMo) poorly  differentiated gastric cancer of the body and antrum, Her2 negative. He completed 8 cycles of FLOT chemotherapy 4/16/20-8/12/20  then had total gastrectomy with esophagojejunostomy with enblock resection of segment 2/3, periportal and D2 lymphadenectomy, feeding jejunostomy, complete omentectomy,oversewing of duodenum on 9/14/20 and completed post-op chemo(Xeloda)RT 12/14/20 at [facility]. Surveillance CT AP on 2/3/21 showed interval development of left hepatic vein thrombosis as well as a large geographic region of hypodensity throughout the left hepatic lobe measuring 8.4x7.2x7.8 cm . He underwent liver biopsy on 3/23/21, path confirmed moderately differentiated adenocarcinoma, positive for CK7, negative for CK20, CDX-2 , Hepar-1.  His oncologist at [facility] is suggesting he has Immunotherapy. He has sought second opinion from [facility] and is now here for a third opinion. He is reluctant to offer in depth information regarding Phx.\par \par 9/14/20\par surgical pathology report\par A. Omentum, Omentectomy: Benign omental fibroadipose tissue with chronic inflammation and reactive mesothelium\par B Lymph node, common bile duct , excision: One benign lymph node(0/1)\par C. Lymph node, Hepatic artery , excision: Metastatic adenocarcinoma involving three of three lymph node(3/3)\par D. Lymph node, portal vein excision: One benign lymph node(0/1)\par E. Stomach and Liber, Total gastrectomy and partial hepatectomy \par - Invasive poorly differentiated adenocarcinoma, measuring 8.0cm, predominantly involving gastric body and antrum\par -Tumor invades through entire thickness of gastric wall and into liver parenchyma. \par -All surgical margins( proximal ,distal, radial and liver resection margins) are free of tumor.\par - Focal lymphovascular space invasion present \par - No peripheral invasion seen\par -Metastatic adenocarcinoma involving one of twenty five lymph nodes(1/25)\par -Metastatic focus in lymph node measures 2.2cm\par -Background stomach shows intestinal metaplasia\par -AJCC 8th ed pathologic tumor stage: [id number]\par F.Lymph node, left gastric , excision:  One benign lymph node(0/1)\par G .Lymph node, Infrapancreatic, excision: Metastatic adenocarcinoma involving one of one lymph node(1/1) \par \par 2/3/21 CT CAP At [facility]\par 1.Interval development of left hepatic vein thrombosis as well as a large geographic region of hypodensity throughout the left hepatic lobe measuring 8.4x7.2x7.8cm. Finding could represent a perfusional anomaly related to left hepatic vein occlusion. However, a large metastatic lesion with associated left hepatic vein thrombosis is not excluded. Additionally, there is a 1.3am hypodensity in hepatic seg 6/7, which is also new and raises concern for a small metastatic focus. \par 2.Stable postsurgical changes of total gastrectomy and esophagojejunostomy and jejunojejunostomy , jejunostomy tube in place. No evidence of recurrence. No bowel obstruction. \par 3. No pulmonary nodule, focal consolidation, pleural effusion, or pneumothorax. No thoracic lymphadenopathy. \par 4. No abdominopelvic lymphadenopathy. \par \par 2/24/21 MRI Abdomen\par 1.Gastric adenocarcinoma s/p total gastrectomy with postsurgical appearance. No suspicious nodularity/enhancement at the surgical sites to suggest local recurrence. \par 2. Since the prior to CT dated 11/3/20 there has been interval increased atrophy of the left lateral segment, with  lack of uptake notes on hepatobiliary phase imaging  in a geographic distribution, suggestive of post radiation treatment changes. There is associated diminished caliber of the left portal vein and occluded left hepatic vein. Findings are most likely related to post radiation treatment changes of the left lateral segment. Infiltrative metastasis is considered much less likely. Recommend a 3 months f/u\par 3.Interval development of a new discrete 2 cm segment 6/7 hepatic lesion since the prior to CT AP dated 11/3/20 suspicious for viable hepatic metastasis. Additional indeterminate 0.3cm segment 5 hepatic lesion for which hepatic metastasis also remains on the differential diagnosis. \par \par 3/23/21\par CT guided liver biopsy\par -Adenocarcinoma, moderately-poorly differentiated \par positive for CK7, negative for CK20, CDX-2 Hepar-1\par This pattern of staining supports the diagnosis, but is not specific in regards to site of origin. Given  patient's history of gastric cancer, this morphology and immunophenotype would be compatible with a metastasis from that site.  \par \par 5/27/21 CT AP at [facility]\par 2.1 cm x 1.9 cm right hepatic mass previously 1.3x0.9cm. 1.1cm hypodense lesion right hepatic lobe not seen previously. Previously seen 8.4cmx7.2cm ill defined hypodensity throughout the left hepatic lobe is no longer seen. Imp: right hepatic mass increased in size. New small right hepatic lesion suspicious metastasis\par \par 07/13/21 MR Abdomen w/wo IV Contrast ([facility])\par Progressive atrophy of the left lobe of the liver and metastatic lesion with hypoenhancing defect on hepatobiliary phase imaging measuring approximately 4.2 x 3.2 cm, decreased in size from February CT where it measured 8.4 x 7.2 cm and from May CT where it measured approximately 4.3 x 4.1 cm. Hypoenhancing segment 6/7 lesion measuring 2.1 cm on prior CT scan measures 1.4 cm on the current exam. Segment 6 lesion is not readily seen on current study. \par No new liver metastasis are identified. MRI Findings of hepatic iron deposition. Hemochromatosis protocol by be performed as needed. Asymmetric left hemidiaphragm elevation as above. \par \par ct DNA not detected (7/6/21)\par \par 8/9/21 MRI Abdomen\par 1. Minimal interval decreased size left hepatic lobe hypoenhancing lesion now measuring 3.9cm, previously measuring 4.2cm\par 2. Interval decreased size right hepatic lobe segment 6/7 lesion measuring 1.0 cm previously measuring 1.4cm\par 3. No significant change right hepatic lobe segment 6 lesion ,measuring 0.7cm\par 4. No signs of hepatic iron deposition. \par   [de-identified] : Patient presents to clinic for f/u after C7. States appetite is fine and he feels well. Denies any pain, SOB, chest pain, V/N/C/D, skin rash, or fever. \par \par

## 2021-09-14 NOTE — ASSESSMENT
[FreeTextEntry1] : Mr. Murry is 65yr M with Stage III(ypT4b,pN2,cMo AJCC 8th ed) poorly  differentiated gastric cancer of the body and antrum with MSI-H, Her2 -ve, KRAS WT, PD- L1 expression with CPS 5, s/p 8 cycles of FLOT (4/16/20-8/12/20), Total gastrectomy with esophagojejunostomy with enblock resection of segment 2/3, periportal and D2 lymphadenectomy, feeding jejunostomy, complete omentectomy,oversewing of duodenum (9/14/20), (Xeloda)RT(12/14/20). Now with recurrent dz,isolated biopsy proven liver met segment 6. Started C1 Pembrolizumab with Dr. Mendoza at [place] on 4/20/21. He went to the ED at [facility] for pain and leakage from PEG tube on 5/27 and repeated CT AP with mixed response and PEG tube was removed after that. S/P C6 Pembrolizumab on 8/12/21. He gets Pembro at [facility] due to proximity to his home. Tolerating Tx well, symptomatically improved and CEA has been trending down. MR Abdomen (8/9/21) showing decreased in left and right lobe mets. Patient here for f/u after C7. \par \par 9/14/20\par surgical pathology report\par A. Omentum, Omentectomy: Benign omental fibroadipose tissue with chronic inflammation and reactive mesothelium\par B Lymph node, common bile duct , excision: One benign lymph node(0/1)\par C. Lymph node, Hepatic artery , excision: Metastatic adenocarcinoma involving three of three lymph node(3/3)\par D. Lymph node, portal vein excision: One benign lymph node(0/1)\par E. Stomach and Liber, Total gastrectomy and partial hepatectomy \par - Invasive poorly differentiated adenocarcinoma, measuring 8.0cm, predominantly involving gastric body and antrum\par -Tumor invades through entire thickness of gastric wall and into liver parenchyma. \par -All surgical margins( proximal ,distal, radial and liver resection margins) are free of tumor.\par - Focal lymphovascular space invasion present \par - No peripheral invasion seen\par -Metastatic adenocarcinoma involving one of twenty five lymph nodes(1/25)\par -Metastatic focus in lymph node measures 2.2cm\par -Background stomach shows intestinal metaplasia\par -AJCC 8th ed pathologic tumor stage: [id number]\par F.Lymph node, left gastric , excision:  One benign lymph node(0/1)\par G .Lymph node, Infrapancreatic, excision: Metastatic adenocarcinoma involving one of one lymph node(1/1) \par \par 2/3/21 CT CAP At [facility]\par 1.Interval development of left hepatic vein thrombosis as well as a large geographic region of hypodensity throughout the left hepatic lobe measuring 8.4x7.2x7.8cm. Finding could represent a perfusional anomaly related to left hepatic vein occlusion. However, a large metastatic lesion with associated left hepatic vein thrombosis is not excluded. Additionally, there is a 1.3am hypodensity in hepatic seg 6/7, which is also new and raises concern for a small metastatic focus. \par 2.Stable postsurgical changes of total gastrectomy and esophagojejunostomy and jejunojejunostomy , jejunostomy tube in place. No evidence of recurrence. No bowel obstruction. \par 3. No pulmonary nodule, focal consolidation, pleural effusion, or pneumothorax. No thoracic lymphadenopathy. \par 4. No abdominopelvic lymphadenopathy. \par \par 2/24/21 MRI Abdomen\par 1.Gastric adenocarcinoma s/p total gastrectomy with postsurgical appearance. No suspicious nodularity/enhancement at the surgical sites to suggest local recurrence. \par 2. Since the prior to CT dated 11/3/20 there has been interval increased atrophy of the left lateral segment, with  lack of uptake notes on hepatobiliary phase imaging  in a geographic distribution, suggestive of post radiation treatment changes. There is associated diminished caliber of the left portal vein and occluded left hepatic vein. Findings are most likely related to post radiation treatment changes of the left lateral segment. Infiltrative metastasis is considered much less likely. Recommend a 3 months f/u\par 3.Interval development of a new discrete 2 cm segment 6/7 hepatic lesion since the prior to CT AP dated 11/3/20 suspicious for viable hepatic metastasis. Additional indeterminate 0.3cm segment 5 hepatic lesion for which hepatic metastasis also remains on the differential diagnosis. \par \par 3/23/21\par CT guided liver biopsy\par -Adenocarcinoma, moderately-poorly differentiated \par positive for CK7, negative for CK20, CDX-2 Hepar-1\par This pattern of staining supports the diagnosis, but is not specific in regards to site of origin. Given  patient's history of gastric cancer, this morphology and immunophenotype would be compatible with a metastasis from that site.  \par \par 5/27/21 CT AP at [facility]\par 2.1 cm x 1.9 cm right hepatic mass previously 1.3x0.9cm. 1.1cm hypodense lesion right hepatic lobe not seen previously. Previously seen 8.4cmx7.2cm ill defined hypodensity throughout the left hepatic lobe is no longer seen. Imp: right hepatic mass increased in size. New small right hepatic lesion suspicious metastasis.\par \par 07/13/21 MR Abdomen w/wo IV Contrast ([facility])\par Progressive atrophy of the left lobe of the liver and metastatic lesion with hypoenhancing defect on hepatobiliary phase imaging measuring approximately 4.2 x 3.2 cm, decreased in size from February CT where it measured 8.4 x 7.2 cm and from May CT where it measured approximately 4.3 x 4.1 cm. Hypoenhancing segment 6/7 lesion measuring 2.1 cm on prior CT scan measures 1.4 cm on the current exam. Segment 6 lesion is not readily seen on current study. \par No new liver metastasis are identified. MRI Findings of hepatic iron deposition. Hemochromatosis protocol by be performed as needed. Asymmetric left hemidiaphragm elevation as above. \par \par ct DNA not detected (7/6/21)\par \par 8/9/21 MRI Abdomen\par 1. Minimal interval decreased size left hepatic lobe hypoenhancing lesion now measuring 3.9cm, previously measuring 4.2cm\par 2. Interval decreased size right hepatic lobe segment 6/7 lesion measuring 1.0 cm previously measuring 1.4cm\par 3. No significant change right hepatic lobe segment 6 lesion ,measuring 0.7cm\par 4. No signs of hepatic iron deposition. \par  \par Plan\par # Stage III(ypT4b,pN2,cMo AJCC 8th ed) MSI-H, Her2 -ve, KRAS-ve, PD- L1 expression CPS 5  Gastric cancer, now with recurrent dz with liver mets\par -s/p C8 FLOT, Total gastrectomy with esophagojejunostomy, Xeloda/RT and then noted to have local and hep recurrence\par -Started C1 Pembrolizumab with Dr. Mendoza at [place] on 4/20/21. CT AP on 5/27 with mixed response. MR Abdomen/Pelvis on 8/9/21 showing decrease in size of liver mets. \par - Received C7 Pembro on 9/2\par - Saw surgeon at [facility] and not candidate for surgery  at this time. would continue treatment for 2- 3 months and reevaluate. \par - SW f/u\par - Will repeat CBC, CMP, CEA today. Recent CEA on 8/25/21 was 7,  42.3 (06/14/21) and 253 (05/05/21). \par -Given clinical benefit (no longer requires PEG feeds), decreasing CEA and disappearance of large liver lesion, continue with Pembrolizumab. If CEA increases and increased lesions, will add ipilimumab. \par -ct DNA not detected (7/6/21) will repeat it in 3 months\par -check CBC, CMP, CEA, B12, fe studies and Vit D\par \par # Peripheral neuropathy/confusion \par - Continue gabapentin as per Dr. Tavarez. \par \par # ADAN\par -s/p Injectafer x2 (5/26,6/2)\par \par RTC in 3 weeks

## 2021-09-14 NOTE — PHYSICAL EXAM
[Restricted in physically strenuous activity but ambulatory and able to carry out work of a light or sedentary nature] : Status 1- Restricted in physically strenuous activity but ambulatory and able to carry out work of a light or sedentary nature, e.g., light house work, office work [Thin] : thin [Normal] : affect appropriate [de-identified] : s/p g- tube removal

## 2021-09-15 ENCOUNTER — APPOINTMENT (OUTPATIENT)
Dept: HEMATOLOGY ONCOLOGY | Facility: CLINIC | Age: 66
End: 2021-09-15
Payer: MEDICAID

## 2021-09-20 ENCOUNTER — TRANSCRIPTION ENCOUNTER (OUTPATIENT)
Age: 66
End: 2021-09-20

## 2021-09-21 LAB
25(OH)D3 SERPL-MCNC: 28.1 NG/ML
ALBUMIN SERPL ELPH-MCNC: 3.6 G/DL
ALP BLD-CCNC: 127 U/L
ALT SERPL-CCNC: 37 U/L
ANION GAP SERPL CALC-SCNC: 4 MMOL/L
AST SERPL-CCNC: 44 U/L
BILIRUB SERPL-MCNC: 0.8 MG/DL
BUN SERPL-MCNC: 15 MG/DL
CALCIUM SERPL-MCNC: 9.3 MG/DL
CEA SERPL-MCNC: 6.1 NG/ML
CHLORIDE SERPL-SCNC: 107 MMOL/L
CO2 SERPL-SCNC: 30 MMOL/L
CREAT SERPL-MCNC: 0.7 MG/DL
FOLATE SERPL-MCNC: 11.9 NG/ML
GLUCOSE SERPL-MCNC: 88 MG/DL
POTASSIUM SERPL-SCNC: 4.1 MMOL/L
PROT SERPL-MCNC: 6.2 G/DL
SODIUM SERPL-SCNC: 141 MMOL/L

## 2021-09-22 LAB — VIT B2 SERPL-MCNC: 207 UG/L

## 2021-09-23 NOTE — HISTORY OF PRESENT ILLNESS
[de-identified] : Mr. Murry is 65yr M with Stage III(ypT4b,pN2,cMo AJCC 8th ed) poorly  differentiated gastric cancer of the body and antrum with MSI-H, Her2 -ve, KRAS WT, PD- L1 expression with CPS 5, s/p 8 cycles of FLOT (4/16/20-8/12/20), Total gastrectomy with esophagojejunostomy with enblock resection of segment 2/3, periportal and D2 lymphadenectomy, feeding jejunostomy, complete omentectomy,oversewing of duodenum (9/14/20), (Xeloda)RT(12/14/20). Now with recurrent dz,isolated biopsy proven liver met segment 6. Started C1 Pembrolizumab with Dr. Mendoza at [place] on 4/20/21. He went to the ED at [facility] for pain and leakage from PEG tube on 5/27 and repeated CT AP with mixed response and PEG tube was removed after that. s/p C6 Pembrolizumab on 8/12/21. Tolerating Tx well, symptomatically improved and CEA has been trending down. MR Abdomen (8/9/21) showing decreased in left and right lobe mets. Patient here for follow-up after C7 on 9/2/21.\par \par He was diagnosed with  Stage III(ypT4b,pN2,cMo) poorly  differentiated gastric cancer of the body and antrum, Her2 negative. He completed 8 cycles of FLOT chemotherapy 4/16/20-8/12/20  then had total gastrectomy with esophagojejunostomy with enblock resection of segment 2/3, periportal and D2 lymphadenectomy, feeding jejunostomy, complete omentectomy,oversewing of duodenum on 9/14/20 and completed post-op chemo(Xeloda)RT 12/14/20 at [facility]. Surveillance CT AP on 2/3/21 showed interval development of left hepatic vein thrombosis as well as a large geographic region of hypodensity throughout the left hepatic lobe measuring 8.4x7.2x7.8 cm . He underwent liver biopsy on 3/23/21, path confirmed moderately differentiated adenocarcinoma, positive for CK7, negative for CK20, CDX-2 , Hepar-1.  His oncologist at [facility] is suggesting he has Immunotherapy. He has sought second opinion from [facility] and is now here for a third opinion. He is reluctant to offer in depth information regarding Phx.\par \par 9/14/20\par surgical pathology report\par A. Omentum, Omentectomy: Benign omental fibroadipose tissue with chronic inflammation and reactive mesothelium\par B Lymph node, common bile duct , excision: One benign lymph node(0/1)\par C. Lymph node, Hepatic artery , excision: Metastatic adenocarcinoma involving three of three lymph node(3/3)\par D. Lymph node, portal vein excision: One benign lymph node(0/1)\par E. Stomach and Liber, Total gastrectomy and partial hepatectomy \par - Invasive poorly differentiated adenocarcinoma, measuring 8.0cm, predominantly involving gastric body and antrum\par -Tumor invades through entire thickness of gastric wall and into liver parenchyma. \par -All surgical margins( proximal ,distal, radial and liver resection margins) are free of tumor.\par - Focal lymphovascular space invasion present \par - No peripheral invasion seen\par -Metastatic adenocarcinoma involving one of twenty five lymph nodes(1/25)\par -Metastatic focus in lymph node measures 2.2cm\par -Background stomach shows intestinal metaplasia\par -AJCC 8th ed pathologic tumor stage: [id number]\par F.Lymph node, left gastric , excision:  One benign lymph node(0/1)\par G .Lymph node, Infrapancreatic, excision: Metastatic adenocarcinoma involving one of one lymph node(1/1) \par \par 2/3/21 CT CAP At [facility]\par 1.Interval development of left hepatic vein thrombosis as well as a large geographic region of hypodensity throughout the left hepatic lobe measuring 8.4x7.2x7.8cm. Finding could represent a perfusional anomaly related to left hepatic vein occlusion. However, a large metastatic lesion with associated left hepatic vein thrombosis is not excluded. Additionally, there is a 1.3am hypodensity in hepatic seg 6/7, which is also new and raises concern for a small metastatic focus. \par 2.Stable postsurgical changes of total gastrectomy and esophagojejunostomy and jejunojejunostomy , jejunostomy tube in place. No evidence of recurrence. No bowel obstruction. \par 3. No pulmonary nodule, focal consolidation, pleural effusion, or pneumothorax. No thoracic lymphadenopathy. \par 4. No abdominopelvic lymphadenopathy. \par \par 2/24/21 MRI Abdomen\par 1.Gastric adenocarcinoma s/p total gastrectomy with postsurgical appearance. No suspicious nodularity/enhancement at the surgical sites to suggest local recurrence. \par 2. Since the prior to CT dated 11/3/20 there has been interval increased atrophy of the left lateral segment, with  lack of uptake notes on hepatobiliary phase imaging  in a geographic distribution, suggestive of post radiation treatment changes. There is associated diminished caliber of the left portal vein and occluded left hepatic vein. Findings are most likely related to post radiation treatment changes of the left lateral segment. Infiltrative metastasis is considered much less likely. Recommend a 3 months f/u\par 3.Interval development of a new discrete 2 cm segment 6/7 hepatic lesion since the prior to CT AP dated 11/3/20 suspicious for viable hepatic metastasis. Additional indeterminate 0.3cm segment 5 hepatic lesion for which hepatic metastasis also remains on the differential diagnosis. \par \par 3/23/21\par CT guided liver biopsy\par -Adenocarcinoma, moderately-poorly differentiated \par positive for CK7, negative for CK20, CDX-2 Hepar-1\par This pattern of staining supports the diagnosis, but is not specific in regards to site of origin. Given  patient's history of gastric cancer, this morphology and immunophenotype would be compatible with a metastasis from that site.  \par \par 5/27/21 CT AP at [facility]\par 2.1 cm x 1.9 cm right hepatic mass previously 1.3x0.9cm. 1.1cm hypodense lesion right hepatic lobe not seen previously. Previously seen 8.4cmx7.2cm ill defined hypodensity throughout the left hepatic lobe is no longer seen. Imp: right hepatic mass increased in size. New small right hepatic lesion suspicious metastasis\par \par 07/13/21 MR Abdomen w/wo IV Contrast ([facility])\par Progressive atrophy of the left lobe of the liver and metastatic lesion with hypoenhancing defect on hepatobiliary phase imaging measuring approximately 4.2 x 3.2 cm, decreased in size from February CT where it measured 8.4 x 7.2 cm and from May CT where it measured approximately 4.3 x 4.1 cm. Hypoenhancing segment 6/7 lesion measuring 2.1 cm on prior CT scan measures 1.4 cm on the current exam. Segment 6 lesion is not readily seen on current study. \par No new liver metastasis are identified. MRI Findings of hepatic iron deposition. Hemochromatosis protocol by be performed as needed. Asymmetric left hemidiaphragm elevation as above. \par \par ct DNA not detected (7/6/21)\par \par 8/9/21 MRI Abdomen\par 1. Minimal interval decreased size left hepatic lobe hypoenhancing lesion now measuring 3.9cm, previously measuring 4.2cm\par 2. Interval decreased size right hepatic lobe segment 6/7 lesion measuring 1.0 cm previously measuring 1.4cm\par 3. No significant change right hepatic lobe segment 6 lesion ,measuring 0.7cm\par 4. No signs of hepatic iron deposition. \par   [de-identified] : Patient presents to clinic for f/u after C7. States appetite is fine and he feels well. Denies any pain, SOB, chest pain, V/N/C/D, skin rash, or fever. \par \par

## 2021-09-23 NOTE — REVIEW OF SYSTEMS
[Fever] : no fever [Chills] : no chills [Night Sweats] : no night sweats [Eye Pain] : no eye pain [Dysphagia] : no dysphagia [Odynophagia] : no odynophagia [Chest Pain] : no chest pain [Lower Ext Edema] : no lower extremity edema [Shortness Of Breath] : no shortness of breath [Wheezing] : no wheezing [Cough] : no cough [Abdominal Pain] : no abdominal pain [Vomiting] : no vomiting [Constipation] : no constipation [Diarrhea] : no diarrhea [Dysuria] : no dysuria [Muscle Weakness] : no muscle weakness [Confused] : no confusion [Dizziness] : no dizziness [Difficulty Walking] : no difficulty walking [de-identified] : chronic tingling to fingers/toes, burning in body,  [FreeTextEntry7] : PEG tube removed

## 2021-09-23 NOTE — ASSESSMENT
[FreeTextEntry1] : Mr. Murry is 65yr M with Stage III(ypT4b,pN2,cMo AJCC 8th ed) poorly  differentiated gastric cancer of the body and antrum with MSI-H, Her2 -ve, KRAS WT, PD- L1 expression with CPS 5, s/p 8 cycles of FLOT (4/16/20-8/12/20), Total gastrectomy with esophagojejunostomy with enblock resection of segment 2/3, periportal and D2 lymphadenectomy, feeding jejunostomy, complete omentectomy,oversewing of duodenum (9/14/20), (Xeloda)RT(12/14/20). Now with recurrent dz,isolated biopsy proven liver met segment 6. Started C1 Pembrolizumab with Dr. Mendoza at [place] on 4/20/21. He went to the ED at [facility] for pain and leakage from PEG tube on 5/27 and repeated CT AP with mixed response and PEG tube was removed after that. S/P C6 Pembrolizumab on 8/12/21. He gets Pembro at [facility] due to proximity to his home. Tolerating Tx well, symptomatically improved and CEA has been trending down. MR Abdomen (8/9/21) showing decreased in left and right lobe mets. Patient here for f/u after C7. \par \par 9/14/20\par surgical pathology report\par A. Omentum, Omentectomy: Benign omental fibroadipose tissue with chronic inflammation and reactive mesothelium\par B Lymph node, common bile duct , excision: One benign lymph node(0/1)\par C. Lymph node, Hepatic artery , excision: Metastatic adenocarcinoma involving three of three lymph node(3/3)\par D. Lymph node, portal vein excision: One benign lymph node(0/1)\par E. Stomach and Liber, Total gastrectomy and partial hepatectomy \par - Invasive poorly differentiated adenocarcinoma, measuring 8.0cm, predominantly involving gastric body and antrum\par -Tumor invades through entire thickness of gastric wall and into liver parenchyma. \par -All surgical margins( proximal ,distal, radial and liver resection margins) are free of tumor.\par - Focal lymphovascular space invasion present \par - No peripheral invasion seen\par -Metastatic adenocarcinoma involving one of twenty five lymph nodes(1/25)\par -Metastatic focus in lymph node measures 2.2cm\par -Background stomach shows intestinal metaplasia\par -AJCC 8th ed pathologic tumor stage: [id number]\par F.Lymph node, left gastric , excision:  One benign lymph node(0/1)\par G .Lymph node, Infrapancreatic, excision: Metastatic adenocarcinoma involving one of one lymph node(1/1) \par \par 2/3/21 CT CAP At [facility]\par 1.Interval development of left hepatic vein thrombosis as well as a large geographic region of hypodensity throughout the left hepatic lobe measuring 8.4x7.2x7.8cm. Finding could represent a perfusional anomaly related to left hepatic vein occlusion. However, a large metastatic lesion with associated left hepatic vein thrombosis is not excluded. Additionally, there is a 1.3am hypodensity in hepatic seg 6/7, which is also new and raises concern for a small metastatic focus. \par 2.Stable postsurgical changes of total gastrectomy and esophagojejunostomy and jejunojejunostomy , jejunostomy tube in place. No evidence of recurrence. No bowel obstruction. \par 3. No pulmonary nodule, focal consolidation, pleural effusion, or pneumothorax. No thoracic lymphadenopathy. \par 4. No abdominopelvic lymphadenopathy. \par \par 2/24/21 MRI Abdomen\par 1.Gastric adenocarcinoma s/p total gastrectomy with postsurgical appearance. No suspicious nodularity/enhancement at the surgical sites to suggest local recurrence. \par 2. Since the prior to CT dated 11/3/20 there has been interval increased atrophy of the left lateral segment, with  lack of uptake notes on hepatobiliary phase imaging  in a geographic distribution, suggestive of post radiation treatment changes. There is associated diminished caliber of the left portal vein and occluded left hepatic vein. Findings are most likely related to post radiation treatment changes of the left lateral segment. Infiltrative metastasis is considered much less likely. Recommend a 3 months f/u\par 3.Interval development of a new discrete 2 cm segment 6/7 hepatic lesion since the prior to CT AP dated 11/3/20 suspicious for viable hepatic metastasis. Additional indeterminate 0.3cm segment 5 hepatic lesion for which hepatic metastasis also remains on the differential diagnosis. \par \par 3/23/21\par CT guided liver biopsy\par -Adenocarcinoma, moderately-poorly differentiated \par positive for CK7, negative for CK20, CDX-2 Hepar-1\par This pattern of staining supports the diagnosis, but is not specific in regards to site of origin. Given  patient's history of gastric cancer, this morphology and immunophenotype would be compatible with a metastasis from that site.  \par \par 5/27/21 CT AP at [facility]\par 2.1 cm x 1.9 cm right hepatic mass previously 1.3x0.9cm. 1.1cm hypodense lesion right hepatic lobe not seen previously. Previously seen 8.4cmx7.2cm ill defined hypodensity throughout the left hepatic lobe is no longer seen. Imp: right hepatic mass increased in size. New small right hepatic lesion suspicious metastasis.\par \par 07/13/21 MR Abdomen w/wo IV Contrast ([facility])\par Progressive atrophy of the left lobe of the liver and metastatic lesion with hypoenhancing defect on hepatobiliary phase imaging measuring approximately 4.2 x 3.2 cm, decreased in size from February CT where it measured 8.4 x 7.2 cm and from May CT where it measured approximately 4.3 x 4.1 cm. Hypoenhancing segment 6/7 lesion measuring 2.1 cm on prior CT scan measures 1.4 cm on the current exam. Segment 6 lesion is not readily seen on current study. \par No new liver metastasis are identified. MRI Findings of hepatic iron deposition. Hemochromatosis protocol by be performed as needed. Asymmetric left hemidiaphragm elevation as above. \par \par ct DNA not detected (7/6/21)\par \par 8/9/21 MRI Abdomen\par 1. Minimal interval decreased size left hepatic lobe hypoenhancing lesion now measuring 3.9cm, previously measuring 4.2cm\par 2. Interval decreased size right hepatic lobe segment 6/7 lesion measuring 1.0 cm previously measuring 1.4cm\par 3. No significant change right hepatic lobe segment 6 lesion ,measuring 0.7cm\par 4. No signs of hepatic iron deposition. \par  \par Plan\par # Stage III(ypT4b,pN2,cMo AJCC 8th ed) MSI-H, Her2 -ve, KRAS-ve, PD- L1 expression CPS 5  Gastric cancer, now with recurrent dz with liver mets\par -s/p C8 FLOT, Total gastrectomy with esophagojejunostomy, Xeloda/RT and then noted to have local and hep recurrence\par -Started C1 Pembrolizumab with Dr. Mendoza at [place] on 4/20/21. CT AP on 5/27 with mixed response. MR Abdomen/Pelvis on 8/9/21 showing decrease in size of liver mets. \par - Received C7 Pembro on 9/2\par - Saw surgeon at [facility] and not candidate for surgery  at this time. would continue treatment for 2- 3 months and reevaluate. \par - SW f/u\par - Will repeat CBC, CMP, CEA today. Recent CEA on 8/25/21 was 7,  42.3 (06/14/21) and 253 (05/05/21). \par -Given clinical benefit (no longer requires PEG feeds), decreasing CEA and disappearance of large liver lesion, continue with Pembrolizumab. If CEA increases and increased lesions, will add ipilimumab. \par -ct DNA not detected (7/6/21) will repeat it in 3 months\par -check CBC, CMP, CEA, B12, fe studies and Vit D\par \par # Peripheral neuropathy/confusion \par - Continue gabapentin as per Dr. Tavarez. \par \par # ADAN\par -s/p Injectafer x2 (5/26,6/2)\par \par RTC in 3 weeks

## 2021-09-30 ENCOUNTER — APPOINTMENT (OUTPATIENT)
Dept: NEUROLOGY | Facility: CLINIC | Age: 66
End: 2021-09-30
Payer: MEDICAID

## 2021-09-30 ENCOUNTER — NON-APPOINTMENT (OUTPATIENT)
Age: 66
End: 2021-09-30

## 2021-09-30 VITALS
SYSTOLIC BLOOD PRESSURE: 113 MMHG | WEIGHT: 129 LBS | OXYGEN SATURATION: 93 % | HEART RATE: 79 BPM | TEMPERATURE: 98.7 F | HEIGHT: 70 IN | DIASTOLIC BLOOD PRESSURE: 62 MMHG | BODY MASS INDEX: 18.47 KG/M2

## 2021-09-30 DIAGNOSIS — G47.01 INSOMNIA DUE TO MEDICAL CONDITION: ICD-10-CM

## 2021-09-30 PROCEDURE — 99214 OFFICE O/P EST MOD 30 MIN: CPT

## 2021-09-30 NOTE — ASSESSMENT
[FreeTextEntry1] : Encouraged to take gabapentin regularly - try 12mL (600mg) BID since no adverse effects noted with this dose\par Insomnia - appears related to anxiety, perhaps due to health - encouraged to discuss with PCP and consider psych referral if necessary\par Gabapentin may also help with this in the meantime\par f/u in 3 months

## 2021-09-30 NOTE — HISTORY OF PRESENT ILLNESS
[FreeTextEntry1] : He was seen last month with plan for 3 months follow up, but returns again today with no new neurologic complaints\par He is taking gabapentin but not regularly. Sometimes takes 6mL (300mg), sometimes 12mL (600mg); he says that the higher dose helps "sometimes"\par \par He complains of insomnia, states because his "mind doesn't stop"\par \par Reviewed: \par Labs - paraneoplastic panel negative, folate, B1 and B6 normal

## 2021-10-12 ENCOUNTER — LABORATORY RESULT (OUTPATIENT)
Age: 66
End: 2021-10-12

## 2021-10-12 ENCOUNTER — APPOINTMENT (OUTPATIENT)
Dept: HEMATOLOGY ONCOLOGY | Facility: CLINIC | Age: 66
End: 2021-10-12
Payer: MEDICAID

## 2021-10-12 VITALS
WEIGHT: 129 LBS | OXYGEN SATURATION: 100 % | TEMPERATURE: 98.1 F | HEART RATE: 73 BPM | DIASTOLIC BLOOD PRESSURE: 70 MMHG | SYSTOLIC BLOOD PRESSURE: 117 MMHG | RESPIRATION RATE: 18 BRPM | BODY MASS INDEX: 18.51 KG/M2

## 2021-10-12 PROCEDURE — 99214 OFFICE O/P EST MOD 30 MIN: CPT

## 2021-10-12 NOTE — PHYSICAL EXAM
[Restricted in physically strenuous activity but ambulatory and able to carry out work of a light or sedentary nature] : Status 1- Restricted in physically strenuous activity but ambulatory and able to carry out work of a light or sedentary nature, e.g., light house work, office work [Thin] : thin [Normal] : affect appropriate [de-identified] : s/p g- tube removal

## 2021-10-12 NOTE — ASSESSMENT
[FreeTextEntry1] : Mr. Murry is 65yr M with Stage III(ypT4b,pN2,cMo AJCC 8th ed) poorly  differentiated gastric cancer of the body and antrum with MSI-H, Her2 -ve, KRAS WT, PD- L1 expression with CPS 5, s/p 8 cycles of FLOT (4/16/20-8/12/20), Total gastrectomy with esophagojejunostomy with enblock resection of segment 2/3, periportal and D2 lymphadenectomy, feeding jejunostomy, complete omentectomy,oversewing of duodenum (9/14/20), (Xeloda)RT(12/14/20). Now with recurrent dz,isolated biopsy proven liver met segment 6. Started C1 Pembrolizumab with Dr. Mendoza at [place] on 4/20/21. He went to the ED at [facility] for pain and leakage from PEG tube on 5/27 and repeated CT AP with mixed response and PEG tube was removed after that. S/P C6 Pembrolizumab on 8/12/21. He gets Pembro at [facility] due to proximity to his home. Tolerating Tx well, symptomatically improved and CEA has been trending down. MR Abdomen (8/9/21) showing decreased in left and right lobe mets. Patient here for f/u after C8 on 9/23/21 \par \par 9/14/20\par surgical pathology report\par A. Omentum, Omentectomy: Benign omental fibroadipose tissue with chronic inflammation and reactive mesothelium\par B Lymph node, common bile duct , excision: One benign lymph node(0/1)\par C. Lymph node, Hepatic artery , excision: Metastatic adenocarcinoma involving three of three lymph node(3/3)\par D. Lymph node, portal vein excision: One benign lymph node(0/1)\par E. Stomach and Liber, Total gastrectomy and partial hepatectomy \par - Invasive poorly differentiated adenocarcinoma, measuring 8.0cm, predominantly involving gastric body and antrum\par -Tumor invades through entire thickness of gastric wall and into liver parenchyma. \par -All surgical margins( proximal ,distal, radial and liver resection margins) are free of tumor.\par - Focal lymphovascular space invasion present \par - No peripheral invasion seen\par -Metastatic adenocarcinoma involving one of twenty five lymph nodes(1/25)\par -Metastatic focus in lymph node measures 2.2cm\par -Background stomach shows intestinal metaplasia\par -AJCC 8th ed pathologic tumor stage: [id number]\par F.Lymph node, left gastric , excision:  One benign lymph node(0/1)\par G .Lymph node, Infrapancreatic, excision: Metastatic adenocarcinoma involving one of one lymph node(1/1) \par \par 2/3/21 CT CAP At [facility]\par 1.Interval development of left hepatic vein thrombosis as well as a large geographic region of hypodensity throughout the left hepatic lobe measuring 8.4x7.2x7.8cm. Finding could represent a perfusional anomaly related to left hepatic vein occlusion. However, a large metastatic lesion with associated left hepatic vein thrombosis is not excluded. Additionally, there is a 1.3am hypodensity in hepatic seg 6/7, which is also new and raises concern for a small metastatic focus. \par 2.Stable postsurgical changes of total gastrectomy and esophagojejunostomy and jejunojejunostomy , jejunostomy tube in place. No evidence of recurrence. No bowel obstruction. \par 3. No pulmonary nodule, focal consolidation, pleural effusion, or pneumothorax. No thoracic lymphadenopathy. \par 4. No abdominopelvic lymphadenopathy. \par \par 2/24/21 MRI Abdomen\par 1.Gastric adenocarcinoma s/p total gastrectomy with postsurgical appearance. No suspicious nodularity/enhancement at the surgical sites to suggest local recurrence. \par 2. Since the prior to CT dated 11/3/20 there has been interval increased atrophy of the left lateral segment, with  lack of uptake notes on hepatobiliary phase imaging  in a geographic distribution, suggestive of post radiation treatment changes. There is associated diminished caliber of the left portal vein and occluded left hepatic vein. Findings are most likely related to post radiation treatment changes of the left lateral segment. Infiltrative metastasis is considered much less likely. Recommend a 3 months f/u\par 3.Interval development of a new discrete 2 cm segment 6/7 hepatic lesion since the prior to CT AP dated 11/3/20 suspicious for viable hepatic metastasis. Additional indeterminate 0.3cm segment 5 hepatic lesion for which hepatic metastasis also remains on the differential diagnosis. \par \par 3/23/21\par CT guided liver biopsy\par -Adenocarcinoma, moderately-poorly differentiated \par positive for CK7, negative for CK20, CDX-2 Hepar-1\par This pattern of staining supports the diagnosis, but is not specific in regards to site of origin. Given  patient's history of gastric cancer, this morphology and immunophenotype would be compatible with a metastasis from that site.  \par \par 5/27/21 CT AP at [facility]\par 2.1 cm x 1.9 cm right hepatic mass previously 1.3x0.9cm. 1.1cm hypodense lesion right hepatic lobe not seen previously. Previously seen 8.4cmx7.2cm ill defined hypodensity throughout the left hepatic lobe is no longer seen. Imp: right hepatic mass increased in size. New small right hepatic lesion suspicious metastasis.\par \par 07/13/21 MR Abdomen w/wo IV Contrast ([facility])\par Progressive atrophy of the left lobe of the liver and metastatic lesion with hypoenhancing defect on hepatobiliary phase imaging measuring approximately 4.2 x 3.2 cm, decreased in size from February CT where it measured 8.4 x 7.2 cm and from May CT where it measured approximately 4.3 x 4.1 cm. Hypoenhancing segment 6/7 lesion measuring 2.1 cm on prior CT scan measures 1.4 cm on the current exam. Segment 6 lesion is not readily seen on current study. \par No new liver metastasis are identified. MRI Findings of hepatic iron deposition. Hemochromatosis protocol by be performed as needed. Asymmetric left hemidiaphragm elevation as above. \par \par ct DNA not detected (7/6/21)\par \par 8/9/21 MRI Abdomen\par 1. Minimal interval decreased size left hepatic lobe hypoenhancing lesion now measuring 3.9cm, previously measuring 4.2cm\par 2. Interval decreased size right hepatic lobe segment 6/7 lesion measuring 1.0 cm previously measuring 1.4cm\par 3. No significant change right hepatic lobe segment 6 lesion ,measuring 0.7cm\par 4. No signs of hepatic iron deposition. \par  \par Plan\par # Stage III(ypT4b,pN2,cMo AJCC 8th ed) MSI-H, Her2 -ve, KRAS-ve, PD- L1 expression CPS 5  Gastric cancer, now with recurrent dz with liver mets\par -s/p C8 FLOT, Total gastrectomy with esophagojejunostomy, Xeloda/RT and then noted to have local and hep recurrence\par -Started C1 Pembrolizumab with Dr. Mendoza at [place] on 4/20/21. CT AP on 5/27 with mixed response. MR Abdomen/Pelvis on 8/9/21 showing decrease in size of liver mets. \par - Received C8 Pembro on 9/23\par - Saw surgeon at [facility] and not candidate for surgery  at this time. would continue treatment for 2- 3 months and reevaluate. \par - SW f/u\par - Will repeat CBC, CMP, CEA today. Recent CEA on 9/14/21 was 6.1 from  7 on 8/25,  42.3 (06/14/21) and 253 (05/05/21). \par -Given clinical benefit (no longer requires PEG feeds), decreasing CEA and disappearance of large liver lesion, continue with Pembrolizumab. Surgery should be considered given oligomet dz. \par -ct DNA not detected (7/6/21) will repeat today\par -check CBC, CMP, CEA, B12, fe studies and Vit D\par \par # Peripheral neuropathy/confusion \par - Continue gabapentin as per Dr. Tavarez. \par \par # ADAN\par -s/p Injectafer x2 (5/26,6/2)\par \par RTC in 3 weeks

## 2021-10-12 NOTE — HISTORY OF PRESENT ILLNESS
[de-identified] : Mr. Murry is 65yr M with Stage III(ypT4b,pN2,cMo AJCC 8th ed) poorly  differentiated gastric cancer of the body and antrum with MSI-H, Her2 -ve, KRAS WT, PD- L1 expression with CPS 5, s/p 8 cycles of FLOT (4/16/20-8/12/20), Total gastrectomy with esophagojejunostomy with enblock resection of segment 2/3, periportal and D2 lymphadenectomy, feeding jejunostomy, complete omentectomy,oversewing of duodenum (9/14/20), (Xeloda)RT(12/14/20). Now with recurrent dz,isolated biopsy proven liver met segment 6. Started C1 Pembrolizumab with Dr. Mendoza at [place] on 4/20/21. He went to the ED at [facility] for pain and leakage from PEG tube on 5/27 and repeated CT AP with mixed response and PEG tube was removed after that. s/p C6 Pembrolizumab on 8/12/21. Tolerating Tx well, symptomatically improved and CEA has been trending down. MR Abdomen (8/9/21) showing decreased in left and right lobe mets. Patient here for follow-up after C8 on 9/23/21.\par \par He was diagnosed with  Stage III(ypT4b,pN2,cMo) poorly  differentiated gastric cancer of the body and antrum, Her2 negative. He completed 8 cycles of FLOT chemotherapy 4/16/20-8/12/20  then had total gastrectomy with esophagojejunostomy with enblock resection of segment 2/3, periportal and D2 lymphadenectomy, feeding jejunostomy, complete omentectomy,oversewing of duodenum on 9/14/20 and completed post-op chemo(Xeloda)RT 12/14/20 at [facility]. Surveillance CT AP on 2/3/21 showed interval development of left hepatic vein thrombosis as well as a large geographic region of hypodensity throughout the left hepatic lobe measuring 8.4x7.2x7.8 cm . He underwent liver biopsy on 3/23/21, path confirmed moderately differentiated adenocarcinoma, positive for CK7, negative for CK20, CDX-2 , Hepar-1.  His oncologist at [facility] is suggesting he has Immunotherapy. He has sought second opinion from [facility] and is now here for a third opinion. He is reluctant to offer in depth information regarding Phx.\par \par 9/14/20\par surgical pathology report\par A. Omentum, Omentectomy: Benign omental fibroadipose tissue with chronic inflammation and reactive mesothelium\par B Lymph node, common bile duct , excision: One benign lymph node(0/1)\par C. Lymph node, Hepatic artery , excision: Metastatic adenocarcinoma involving three of three lymph node(3/3)\par D. Lymph node, portal vein excision: One benign lymph node(0/1)\par E. Stomach and Liber, Total gastrectomy and partial hepatectomy \par - Invasive poorly differentiated adenocarcinoma, measuring 8.0cm, predominantly involving gastric body and antrum\par -Tumor invades through entire thickness of gastric wall and into liver parenchyma. \par -All surgical margins( proximal ,distal, radial and liver resection margins) are free of tumor.\par - Focal lymphovascular space invasion present \par - No peripheral invasion seen\par -Metastatic adenocarcinoma involving one of twenty five lymph nodes(1/25)\par -Metastatic focus in lymph node measures 2.2cm\par -Background stomach shows intestinal metaplasia\par -AJCC 8th ed pathologic tumor stage: [id number]\par F.Lymph node, left gastric , excision:  One benign lymph node(0/1)\par G .Lymph node, Infrapancreatic, excision: Metastatic adenocarcinoma involving one of one lymph node(1/1) \par \par 2/3/21 CT CAP At [facility]\par 1.Interval development of left hepatic vein thrombosis as well as a large geographic region of hypodensity throughout the left hepatic lobe measuring 8.4x7.2x7.8cm. Finding could represent a perfusional anomaly related to left hepatic vein occlusion. However, a large metastatic lesion with associated left hepatic vein thrombosis is not excluded. Additionally, there is a 1.3am hypodensity in hepatic seg 6/7, which is also new and raises concern for a small metastatic focus. \par 2.Stable postsurgical changes of total gastrectomy and esophagojejunostomy and jejunojejunostomy , jejunostomy tube in place. No evidence of recurrence. No bowel obstruction. \par 3. No pulmonary nodule, focal consolidation, pleural effusion, or pneumothorax. No thoracic lymphadenopathy. \par 4. No abdominopelvic lymphadenopathy. \par \par 2/24/21 MRI Abdomen\par 1.Gastric adenocarcinoma s/p total gastrectomy with postsurgical appearance. No suspicious nodularity/enhancement at the surgical sites to suggest local recurrence. \par 2. Since the prior to CT dated 11/3/20 there has been interval increased atrophy of the left lateral segment, with  lack of uptake notes on hepatobiliary phase imaging  in a geographic distribution, suggestive of post radiation treatment changes. There is associated diminished caliber of the left portal vein and occluded left hepatic vein. Findings are most likely related to post radiation treatment changes of the left lateral segment. Infiltrative metastasis is considered much less likely. Recommend a 3 months f/u\par 3.Interval development of a new discrete 2 cm segment 6/7 hepatic lesion since the prior to CT AP dated 11/3/20 suspicious for viable hepatic metastasis. Additional indeterminate 0.3cm segment 5 hepatic lesion for which hepatic metastasis also remains on the differential diagnosis. \par \par 3/23/21\par CT guided liver biopsy\par -Adenocarcinoma, moderately-poorly differentiated \par positive for CK7, negative for CK20, CDX-2 Hepar-1\par This pattern of staining supports the diagnosis, but is not specific in regards to site of origin. Given  patient's history of gastric cancer, this morphology and immunophenotype would be compatible with a metastasis from that site.  \par \par 5/27/21 CT AP at [facility]\par 2.1 cm x 1.9 cm right hepatic mass previously 1.3x0.9cm. 1.1cm hypodense lesion right hepatic lobe not seen previously. Previously seen 8.4cmx7.2cm ill defined hypodensity throughout the left hepatic lobe is no longer seen. Imp: right hepatic mass increased in size. New small right hepatic lesion suspicious metastasis\par \par 07/13/21 MR Abdomen w/wo IV Contrast ([facility])\par Progressive atrophy of the left lobe of the liver and metastatic lesion with hypoenhancing defect on hepatobiliary phase imaging measuring approximately 4.2 x 3.2 cm, decreased in size from February CT where it measured 8.4 x 7.2 cm and from May CT where it measured approximately 4.3 x 4.1 cm. Hypoenhancing segment 6/7 lesion measuring 2.1 cm on prior CT scan measures 1.4 cm on the current exam. Segment 6 lesion is not readily seen on current study. \par No new liver metastasis are identified. MRI Findings of hepatic iron deposition. Hemochromatosis protocol by be performed as needed. Asymmetric left hemidiaphragm elevation as above. \par \par ct DNA not detected (7/6/21)\par \par 8/9/21 MRI Abdomen\par 1. Minimal interval decreased size left hepatic lobe hypoenhancing lesion now measuring 3.9cm, previously measuring 4.2cm\par 2. Interval decreased size right hepatic lobe segment 6/7 lesion measuring 1.0 cm previously measuring 1.4cm\par 3. No significant change right hepatic lobe segment 6 lesion ,measuring 0.7cm\par 4. No signs of hepatic iron deposition. \par   [de-identified] : Patient presents to clinic for f/u after C7. States appetite is fine and he feels well. Denies any pain, SOB, chest pain, V/N/C/D, skin rash, or fever. \par \par

## 2021-10-12 NOTE — REVIEW OF SYSTEMS
[Fatigue] : fatigue [Recent Change In Weight] : ~T recent weight change [Fever] : no fever [Chills] : no chills [Night Sweats] : no night sweats [Dysphagia] : no dysphagia [Eye Pain] : no eye pain [Odynophagia] : no odynophagia [Chest Pain] : no chest pain [Lower Ext Edema] : no lower extremity edema [Shortness Of Breath] : no shortness of breath [Wheezing] : no wheezing [Cough] : no cough [Abdominal Pain] : no abdominal pain [Vomiting] : no vomiting [Constipation] : no constipation [Diarrhea] : no diarrhea [Dysuria] : no dysuria [Muscle Weakness] : no muscle weakness [Confused] : no confusion [Dizziness] : no dizziness [Difficulty Walking] : no difficulty walking [FreeTextEntry7] : PEG tube removed [de-identified] : chronic tingling to fingers/toes, burning in body,

## 2021-10-13 LAB — TSH SERPL-ACNC: 1.24 UIU/ML

## 2021-10-14 LAB
25(OH)D3 SERPL-MCNC: 30.3 NG/ML
CEA SERPL-MCNC: 5.7 NG/ML
FOLATE SERPL-MCNC: 9.9 NG/ML
VIT B12 SERPL-MCNC: 405 PG/ML

## 2021-11-10 ENCOUNTER — LABORATORY RESULT (OUTPATIENT)
Age: 66
End: 2021-11-10

## 2021-11-10 ENCOUNTER — APPOINTMENT (OUTPATIENT)
Dept: HEMATOLOGY ONCOLOGY | Facility: CLINIC | Age: 66
End: 2021-11-10
Payer: MEDICAID

## 2021-11-10 VITALS
RESPIRATION RATE: 18 BRPM | TEMPERATURE: 98 F | SYSTOLIC BLOOD PRESSURE: 100 MMHG | BODY MASS INDEX: 18.32 KG/M2 | HEART RATE: 102 BPM | OXYGEN SATURATION: 95 % | DIASTOLIC BLOOD PRESSURE: 65 MMHG | WEIGHT: 128 LBS | HEIGHT: 70 IN

## 2021-11-10 LAB
ALBUMIN SERPL ELPH-MCNC: 3.6 G/DL
ALP BLD-CCNC: 128 U/L
ALT SERPL-CCNC: 31 U/L
ANION GAP SERPL CALC-SCNC: 0 MMOL/L
AST SERPL-CCNC: 36 U/L
BILIRUB SERPL-MCNC: 0.7 MG/DL
BUN SERPL-MCNC: 14 MG/DL
CALCIUM SERPL-MCNC: 9 MG/DL
CHLORIDE SERPL-SCNC: 108 MMOL/L
CO2 SERPL-SCNC: 32 MMOL/L
CREAT SERPL-MCNC: 0.8 MG/DL
GLUCOSE SERPL-MCNC: 59 MG/DL
POTASSIUM SERPL-SCNC: 3.9 MMOL/L
PROT SERPL-MCNC: 6.4 G/DL
SODIUM SERPL-SCNC: 140 MMOL/L

## 2021-11-10 PROCEDURE — 99214 OFFICE O/P EST MOD 30 MIN: CPT

## 2021-11-10 NOTE — PHYSICAL EXAM
[Restricted in physically strenuous activity but ambulatory and able to carry out work of a light or sedentary nature] : Status 1- Restricted in physically strenuous activity but ambulatory and able to carry out work of a light or sedentary nature, e.g., light house work, office work [Thin] : thin [Normal] : affect appropriate [de-identified] : s/p g- tube removal

## 2021-11-10 NOTE — HISTORY OF PRESENT ILLNESS
[de-identified] : Mr. Murry is 65yr M with Stage III(ypT4b,pN2,cMo AJCC 8th ed) poorly  differentiated gastric cancer of the body and antrum with MSI-H, Her2 -ve, KRAS WT, PD- L1 expression with CPS 5, s/p 8 cycles of FLOT (4/16/20-8/12/20), Total gastrectomy with esophagojejunostomy with enblock resection of segment 2/3, periportal and D2 lymphadenectomy, feeding jejunostomy, complete omentectomy,oversewing of duodenum (9/14/20), (Xeloda)RT(12/14/20). Now with recurrent dz,isolated biopsy proven liver met segment 6. Started C1 Pembrolizumab with Dr. Mendoza at [place] on 4/20/21. He went to the ED at [facility] for pain and leakage from PEG tube on 5/27 and repeated CT AP with mixed response and PEG tube was removed after that. s/p C6 Pembrolizumab on 8/12/21. Tolerating Tx well, symptomatically improved and CEA has been trending down. MR Abdomen (8/9/21) showing decreased in left and right lobe mets. Patient here for follow-up after C10 on 11/4/21.\par \par He was diagnosed with  Stage III(ypT4b,pN2,cMo) poorly  differentiated gastric cancer of the body and antrum, Her2 negative. He completed 8 cycles of FLOT chemotherapy 4/16/20-8/12/20  then had total gastrectomy with esophagojejunostomy with enblock resection of segment 2/3, periportal and D2 lymphadenectomy, feeding jejunostomy, complete omentectomy,oversewing of duodenum on 9/14/20 and completed post-op chemo(Xeloda)RT 12/14/20 at [facility]. Surveillance CT AP on 2/3/21 showed interval development of left hepatic vein thrombosis as well as a large geographic region of hypodensity throughout the left hepatic lobe measuring 8.4x7.2x7.8 cm . He underwent liver biopsy on 3/23/21, path confirmed moderately differentiated adenocarcinoma, positive for CK7, negative for CK20, CDX-2 , Hepar-1.  His oncologist at [facility] is suggesting he has Immunotherapy. He has sought second opinion from [facility] and is now here for a third opinion. He is reluctant to offer in depth information regarding Phx.\par \par 9/14/20\par surgical pathology report\par A. Omentum, Omentectomy: Benign omental fibroadipose tissue with chronic inflammation and reactive mesothelium\par B Lymph node, common bile duct , excision: One benign lymph node(0/1)\par C. Lymph node, Hepatic artery , excision: Metastatic adenocarcinoma involving three of three lymph node(3/3)\par D. Lymph node, portal vein excision: One benign lymph node(0/1)\par E. Stomach and Liber, Total gastrectomy and partial hepatectomy \par - Invasive poorly differentiated adenocarcinoma, measuring 8.0cm, predominantly involving gastric body and antrum\par -Tumor invades through entire thickness of gastric wall and into liver parenchyma. \par -All surgical margins( proximal ,distal, radial and liver resection margins) are free of tumor.\par - Focal lymphovascular space invasion present \par - No peripheral invasion seen\par -Metastatic adenocarcinoma involving one of twenty five lymph nodes(1/25)\par -Metastatic focus in lymph node measures 2.2cm\par -Background stomach shows intestinal metaplasia\par -AJCC 8th ed pathologic tumor stage: [id number]\par F.Lymph node, left gastric , excision:  One benign lymph node(0/1)\par G .Lymph node, Infrapancreatic, excision: Metastatic adenocarcinoma involving one of one lymph node(1/1) \par \par 2/3/21 CT CAP At [facility]\par 1.Interval development of left hepatic vein thrombosis as well as a large geographic region of hypodensity throughout the left hepatic lobe measuring 8.4x7.2x7.8cm. Finding could represent a perfusional anomaly related to left hepatic vein occlusion. However, a large metastatic lesion with associated left hepatic vein thrombosis is not excluded. Additionally, there is a 1.3am hypodensity in hepatic seg 6/7, which is also new and raises concern for a small metastatic focus. \par 2.Stable postsurgical changes of total gastrectomy and esophagojejunostomy and jejunojejunostomy , jejunostomy tube in place. No evidence of recurrence. No bowel obstruction. \par 3. No pulmonary nodule, focal consolidation, pleural effusion, or pneumothorax. No thoracic lymphadenopathy. \par 4. No abdominopelvic lymphadenopathy. \par \par 2/24/21 MRI Abdomen\par 1.Gastric adenocarcinoma s/p total gastrectomy with postsurgical appearance. No suspicious nodularity/enhancement at the surgical sites to suggest local recurrence. \par 2. Since the prior to CT dated 11/3/20 there has been interval increased atrophy of the left lateral segment, with  lack of uptake notes on hepatobiliary phase imaging  in a geographic distribution, suggestive of post radiation treatment changes. There is associated diminished caliber of the left portal vein and occluded left hepatic vein. Findings are most likely related to post radiation treatment changes of the left lateral segment. Infiltrative metastasis is considered much less likely. Recommend a 3 months f/u\par 3.Interval development of a new discrete 2 cm segment 6/7 hepatic lesion since the prior to CT AP dated 11/3/20 suspicious for viable hepatic metastasis. Additional indeterminate 0.3cm segment 5 hepatic lesion for which hepatic metastasis also remains on the differential diagnosis. \par \par 3/23/21\par CT guided liver biopsy\par -Adenocarcinoma, moderately-poorly differentiated \par positive for CK7, negative for CK20, CDX-2 Hepar-1\par This pattern of staining supports the diagnosis, but is not specific in regards to site of origin. Given  patient's history of gastric cancer, this morphology and immunophenotype would be compatible with a metastasis from that site.  \par \par 5/27/21 CT AP at [facility]\par 2.1 cm x 1.9 cm right hepatic mass previously 1.3x0.9cm. 1.1cm hypodense lesion right hepatic lobe not seen previously. Previously seen 8.4cmx7.2cm ill defined hypodensity throughout the left hepatic lobe is no longer seen. Imp: right hepatic mass increased in size. New small right hepatic lesion suspicious metastasis\par \par 07/13/21 MR Abdomen w/wo IV Contrast ([facility])\par Progressive atrophy of the left lobe of the liver and metastatic lesion with hypoenhancing defect on hepatobiliary phase imaging measuring approximately 4.2 x 3.2 cm, decreased in size from February CT where it measured 8.4 x 7.2 cm and from May CT where it measured approximately 4.3 x 4.1 cm. Hypoenhancing segment 6/7 lesion measuring 2.1 cm on prior CT scan measures 1.4 cm on the current exam. Segment 6 lesion is not readily seen on current study. \par No new liver metastasis are identified. MRI Findings of hepatic iron deposition. Hemochromatosis protocol by be performed as needed. Asymmetric left hemidiaphragm elevation as above. \par \par ct DNA not detected (7/6/21, 10/12/21)\par \par 8/9/21 MRI Abdomen\par 1. Minimal interval decreased size left hepatic lobe hypoenhancing lesion now measuring 3.9cm, previously measuring 4.2cm\par 2. Interval decreased size right hepatic lobe segment 6/7 lesion measuring 1.0 cm previously measuring 1.4cm\par 3. No significant change right hepatic lobe segment 6 lesion ,measuring 0.7cm\par 4. No signs of hepatic iron deposition. \par   [de-identified] : Patient presents to clinic for f/u after C10 on 11/4/21. Continues to have peripheral neuropathy.  weight has been stable. Overall he feels fine w/o new symptoms.   Denies any pain, SOB, chest pain, V/N/C/D, skin rash, or fever. \par \par

## 2021-11-10 NOTE — ASSESSMENT
[FreeTextEntry1] : Mr. Murry is 65yr M with Stage III(ypT4b,pN2,cMo AJCC 8th ed) poorly  differentiated gastric cancer of the body and antrum with MSI-H, Her2 -ve, KRAS WT, PD- L1 expression with CPS 5, s/p 8 cycles of FLOT (4/16/20-8/12/20), Total gastrectomy with esophagojejunostomy with enblock resection of segment 2/3, periportal and D2 lymphadenectomy, feeding jejunostomy, complete omentectomy,oversewing of duodenum (9/14/20), (Xeloda)RT(12/14/20). Now with recurrent dz,isolated biopsy proven liver met segment 6. Started C1 Pembrolizumab with Dr. Mendoza at [place] on 4/20/21. He went to the ED at [facility] for pain and leakage from PEG tube on 5/27 and repeated CT AP with mixed response and PEG tube was removed after that. S/P C6 Pembrolizumab on 8/12/21. He gets Pembro at [facility] due to proximity to his home. Tolerating Tx well, symptomatically improved and CEA has been trending down. MR Abdomen (8/9/21) showing decreased in left and right lobe mets. Patient here for f/u after C10 on 11/4//21 \par \par 9/14/20\par surgical pathology report\par A. Omentum, Omentectomy: Benign omental fibroadipose tissue with chronic inflammation and reactive mesothelium\par B Lymph node, common bile duct , excision: One benign lymph node(0/1)\par C. Lymph node, Hepatic artery , excision: Metastatic adenocarcinoma involving three of three lymph node(3/3)\par D. Lymph node, portal vein excision: One benign lymph node(0/1)\par E. Stomach and Liber, Total gastrectomy and partial hepatectomy \par - Invasive poorly differentiated adenocarcinoma, measuring 8.0cm, predominantly involving gastric body and antrum\par -Tumor invades through entire thickness of gastric wall and into liver parenchyma. \par -All surgical margins( proximal ,distal, radial and liver resection margins) are free of tumor.\par - Focal lymphovascular space invasion present \par - No peripheral invasion seen\par -Metastatic adenocarcinoma involving one of twenty five lymph nodes(1/25)\par -Metastatic focus in lymph node measures 2.2cm\par -Background stomach shows intestinal metaplasia\par -AJCC 8th ed pathologic tumor stage: [id number]\par F.Lymph node, left gastric , excision:  One benign lymph node(0/1)\par G .Lymph node, Infrapancreatic, excision: Metastatic adenocarcinoma involving one of one lymph node(1/1) \par \par 2/3/21 CT CAP At [facility]\par 1.Interval development of left hepatic vein thrombosis as well as a large geographic region of hypodensity throughout the left hepatic lobe measuring 8.4x7.2x7.8cm. Finding could represent a perfusional anomaly related to left hepatic vein occlusion. However, a large metastatic lesion with associated left hepatic vein thrombosis is not excluded. Additionally, there is a 1.3am hypodensity in hepatic seg 6/7, which is also new and raises concern for a small metastatic focus. \par 2.Stable postsurgical changes of total gastrectomy and esophagojejunostomy and jejunojejunostomy , jejunostomy tube in place. No evidence of recurrence. No bowel obstruction. \par 3. No pulmonary nodule, focal consolidation, pleural effusion, or pneumothorax. No thoracic lymphadenopathy. \par 4. No abdominopelvic lymphadenopathy. \par \par 2/24/21 MRI Abdomen\par 1.Gastric adenocarcinoma s/p total gastrectomy with postsurgical appearance. No suspicious nodularity/enhancement at the surgical sites to suggest local recurrence. \par 2. Since the prior to CT dated 11/3/20 there has been interval increased atrophy of the left lateral segment, with  lack of uptake notes on hepatobiliary phase imaging  in a geographic distribution, suggestive of post radiation treatment changes. There is associated diminished caliber of the left portal vein and occluded left hepatic vein. Findings are most likely related to post radiation treatment changes of the left lateral segment. Infiltrative metastasis is considered much less likely. Recommend a 3 months f/u\par 3.Interval development of a new discrete 2 cm segment 6/7 hepatic lesion since the prior to CT AP dated 11/3/20 suspicious for viable hepatic metastasis. Additional indeterminate 0.3cm segment 5 hepatic lesion for which hepatic metastasis also remains on the differential diagnosis. \par \par 3/23/21\par CT guided liver biopsy\par -Adenocarcinoma, moderately-poorly differentiated \par positive for CK7, negative for CK20, CDX-2 Hepar-1\par This pattern of staining supports the diagnosis, but is not specific in regards to site of origin. Given  patient's history of gastric cancer, this morphology and immunophenotype would be compatible with a metastasis from that site.  \par \par 5/27/21 CT AP at [facility]\par 2.1 cm x 1.9 cm right hepatic mass previously 1.3x0.9cm. 1.1cm hypodense lesion right hepatic lobe not seen previously. Previously seen 8.4cmx7.2cm ill defined hypodensity throughout the left hepatic lobe is no longer seen. Imp: right hepatic mass increased in size. New small right hepatic lesion suspicious metastasis.\par \par 07/13/21 MR Abdomen w/wo IV Contrast ([facility])\par Progressive atrophy of the left lobe of the liver and metastatic lesion with hypoenhancing defect on hepatobiliary phase imaging measuring approximately 4.2 x 3.2 cm, decreased in size from February CT where it measured 8.4 x 7.2 cm and from May CT where it measured approximately 4.3 x 4.1 cm. Hypoenhancing segment 6/7 lesion measuring 2.1 cm on prior CT scan measures 1.4 cm on the current exam. Segment 6 lesion is not readily seen on current study. \par No new liver metastasis are identified. MRI Findings of hepatic iron deposition. Hemochromatosis protocol by be performed as needed. Asymmetric left hemidiaphragm elevation as above. \par \par ct DNA not detected (7/6/21, 10/12/21)\par \par 8/9/21 MRI Abdomen\par 1. Minimal interval decreased size left hepatic lobe hypoenhancing lesion now measuring 3.9cm, previously measuring 4.2cm\par 2. Interval decreased size right hepatic lobe segment 6/7 lesion measuring 1.0 cm previously measuring 1.4cm\par 3. No significant change right hepatic lobe segment 6 lesion ,measuring 0.7cm\par 4. No signs of hepatic iron deposition. \par  \par Plan\par # Stage III(ypT4b,pN2,cMo AJCC 8th ed) MSI-H, Her2 -ve, KRAS-ve, PD- L1 expression CPS 5  Gastric cancer, now with recurrent dz with liver mets\par -s/p C8 FLOT, Total gastrectomy with esophagojejunostomy, Xeloda/RT and then noted to have local and hep recurrence\par -Started C1 Pembrolizumab with Dr. Mendoza at [place] on 4/20/21. CT AP on 5/27 with mixed response. MR Abdomen/Pelvis on 8/9/21 showing decrease in size of liver mets. \par - Received C10 Pembro on 11/4/21\par - Saw surgeon at [facility] and not candidate for surgery  at this time. would continue treatment for 2- 3 months and reevaluate. \par - SW f/u\par - Will repeat CBC, CMP, CEA today. Recent CEA  was 5.7 on 10/12 from  6.1 on 9/14,  7 ( 8/25/21),  42.3 (06/14/21) and 253 (05/05/21). \par -Given clinical benefit (no longer requires PEG feeds), decreasing CEA and disappearance of large liver lesion, continue with Pembrolizumab. Surgery should be considered given oligomet dz. \par -ct DNA not detected (7/6/21, 10/12/21) WIll repeat in 3 months\par -check CBC, CMP, CEA, B12, fe studies and Vit D\par \par # Peripheral neuropathy/confusion \par - Continue gabapentin as per Dr. Tavarez. \par \par # ADAN\par -s/p Injectafer x2 (5/26,6/2), Will repeat today\par \par RTC in 4 weeks

## 2021-11-10 NOTE — REVIEW OF SYSTEMS
[Fatigue] : fatigue [Constipation] : constipation [Difficulty Walking] : difficulty walking [Insomnia] : insomnia [Negative] : Allergic/Immunologic [Fever] : no fever [Chills] : no chills [Night Sweats] : no night sweats [Recent Change In Weight] : ~T no recent weight change [Eye Pain] : no eye pain [Dysphagia] : no dysphagia [Odynophagia] : no odynophagia [Chest Pain] : no chest pain [Lower Ext Edema] : no lower extremity edema [Shortness Of Breath] : no shortness of breath [Wheezing] : no wheezing [Cough] : no cough [Abdominal Pain] : no abdominal pain [Vomiting] : no vomiting [Diarrhea] : no diarrhea [Dysuria] : no dysuria [Muscle Weakness] : no muscle weakness [Confused] : no confusion [Dizziness] : no dizziness [Fainting] : no fainting [de-identified] : chronic tingling to fingers/toes, burning in body

## 2021-11-22 LAB — CEA SERPL-MCNC: 4.8 NG/ML

## 2021-12-08 ENCOUNTER — APPOINTMENT (OUTPATIENT)
Dept: NEUROLOGY | Facility: CLINIC | Age: 66
End: 2021-12-08
Payer: MEDICAID

## 2021-12-08 VITALS
OXYGEN SATURATION: 98 % | DIASTOLIC BLOOD PRESSURE: 65 MMHG | TEMPERATURE: 97.4 F | HEART RATE: 91 BPM | BODY MASS INDEX: 18.18 KG/M2 | HEIGHT: 70 IN | SYSTOLIC BLOOD PRESSURE: 103 MMHG | WEIGHT: 127 LBS

## 2021-12-08 PROCEDURE — 99214 OFFICE O/P EST MOD 30 MIN: CPT

## 2021-12-08 RX ORDER — GABAPENTIN 250 MG/5ML
250 SOLUTION ORAL TWICE DAILY
Qty: 3 | Refills: 0 | Status: DISCONTINUED | COMMUNITY
Start: 2021-05-19 | End: 2021-12-08

## 2021-12-08 NOTE — ASSESSMENT
[FreeTextEntry1] : Switch gabapentin to lyrica 50mg BID \par Add alpha lipoic acid 300mg BID \par f/u in 3 months

## 2021-12-08 NOTE — HISTORY OF PRESENT ILLNESS
[FreeTextEntry1] : He reports neuropathy is the same\par However he did not increase gabapentin as recommended at last visit\par He is taking 300mg (6mL) three times a day \par \par Reviewed:\par Notes from hematology \par Labs - Hb 11.8, MCV 92, Plt 190; iron, ferritin, TIBC normal

## 2021-12-15 ENCOUNTER — APPOINTMENT (OUTPATIENT)
Dept: HEMATOLOGY ONCOLOGY | Facility: CLINIC | Age: 66
End: 2021-12-15
Payer: MEDICAID

## 2021-12-15 ENCOUNTER — LABORATORY RESULT (OUTPATIENT)
Age: 66
End: 2021-12-15

## 2021-12-15 VITALS
TEMPERATURE: 98.2 F | WEIGHT: 128 LBS | HEIGHT: 70 IN | SYSTOLIC BLOOD PRESSURE: 91 MMHG | BODY MASS INDEX: 18.32 KG/M2 | DIASTOLIC BLOOD PRESSURE: 56 MMHG | HEART RATE: 86 BPM | OXYGEN SATURATION: 99 % | RESPIRATION RATE: 18 BRPM

## 2021-12-15 LAB
ALBUMIN SERPL ELPH-MCNC: 3.3 G/DL
ALP BLD-CCNC: 124 U/L
ALT SERPL-CCNC: 38 U/L
ANION GAP SERPL CALC-SCNC: 1 MMOL/L
AST SERPL-CCNC: 46 U/L
BILIRUB SERPL-MCNC: 0.6 MG/DL
BUN SERPL-MCNC: 18 MG/DL
CALCIUM SERPL-MCNC: 8.8 MG/DL
CHLORIDE SERPL-SCNC: 110 MMOL/L
CO2 SERPL-SCNC: 31 MMOL/L
CREAT SERPL-MCNC: 1.1 MG/DL
GLUCOSE SERPL-MCNC: 63 MG/DL
POTASSIUM SERPL-SCNC: 3.9 MMOL/L
PROT SERPL-MCNC: 6 G/DL
SODIUM SERPL-SCNC: 142 MMOL/L

## 2021-12-15 PROCEDURE — 99214 OFFICE O/P EST MOD 30 MIN: CPT

## 2021-12-15 NOTE — PHYSICAL EXAM
[Restricted in physically strenuous activity but ambulatory and able to carry out work of a light or sedentary nature] : Status 1- Restricted in physically strenuous activity but ambulatory and able to carry out work of a light or sedentary nature, e.g., light house work, office work [Thin] : thin [Normal] : affect appropriate [de-identified] : s/p g- tube removal

## 2021-12-15 NOTE — HISTORY OF PRESENT ILLNESS
[de-identified] : Mr. Murry is 65yr M with Stage III(ypT4b,pN2,cMo AJCC 8th ed) poorly  differentiated gastric cancer of the body and antrum with MSI-H, Her2 -ve, KRAS WT, PD- L1 expression with CPS 5, s/p 8 cycles of FLOT (4/16/20-8/12/20), Total gastrectomy with esophagojejunostomy with enblock resection of segment 2/3, periportal and D2 lymphadenectomy, feeding jejunostomy, complete omentectomy,oversewing of duodenum (9/14/20), (Xeloda)RT(12/14/20). Now with recurrent dz,isolated biopsy proven liver met segment 6. Started C1 Pembrolizumab with Dr. Mendoza at [place] on 4/20/21. He went to the ED at [facility] for pain and leakage from PEG tube on 5/27 and repeated CT AP with mixed response and PEG tube was removed after that. S/P C11 Pembro on 11/25/21. He gets Pembro at [facility] due to proximity to his home. Tolerating Tx well, symptomatically improved and CEA has been trending down. MR Abdomen (8/9/21) showing decreased in left and right lobe mets. Patient here for f/u after MRI on 12/1/21 showing continued slightly decrease in size of liver lesions.  \par \par He was diagnosed with  Stage III(ypT4b,pN2,cMo) poorly  differentiated gastric cancer of the body and antrum, Her2 negative. He completed 8 cycles of FLOT chemotherapy 4/16/20-8/12/20  then had total gastrectomy with esophagojejunostomy with enblock resection of segment 2/3, periportal and D2 lymphadenectomy, feeding jejunostomy, complete omentectomy,oversewing of duodenum on 9/14/20 and completed post-op chemo(Xeloda)RT 12/14/20 at [facility]. Surveillance CT AP on 2/3/21 showed interval development of left hepatic vein thrombosis as well as a large geographic region of hypodensity throughout the left hepatic lobe measuring 8.4x7.2x7.8 cm . He underwent liver biopsy on 3/23/21, path confirmed moderately differentiated adenocarcinoma, positive for CK7, negative for CK20, CDX-2 , Hepar-1.  His oncologist at [facility] is suggesting he has Immunotherapy. He has sought second opinion from [facility] and is now here for a third opinion. He is reluctant to offer in depth information regarding Phx.\par \par 9/14/20\par surgical pathology report\par A. Omentum, Omentectomy: Benign omental fibroadipose tissue with chronic inflammation and reactive mesothelium\par B Lymph node, common bile duct , excision: One benign lymph node(0/1)\par C. Lymph node, Hepatic artery , excision: Metastatic adenocarcinoma involving three of three lymph node(3/3)\par D. Lymph node, portal vein excision: One benign lymph node(0/1)\par E. Stomach and Liber, Total gastrectomy and partial hepatectomy \par - Invasive poorly differentiated adenocarcinoma, measuring 8.0cm, predominantly involving gastric body and antrum\par -Tumor invades through entire thickness of gastric wall and into liver parenchyma. \par -All surgical margins( proximal ,distal, radial and liver resection margins) are free of tumor.\par - Focal lymphovascular space invasion present \par - No peripheral invasion seen\par -Metastatic adenocarcinoma involving one of twenty five lymph nodes(1/25)\par -Metastatic focus in lymph node measures 2.2cm\par -Background stomach shows intestinal metaplasia\par -AJCC 8th ed pathologic tumor stage: [id number]\par F.Lymph node, left gastric , excision:  One benign lymph node(0/1)\par G .Lymph node, Infrapancreatic, excision: Metastatic adenocarcinoma involving one of one lymph node(1/1) \par \par 2/3/21 CT CAP At [facility]\par 1.Interval development of left hepatic vein thrombosis as well as a large geographic region of hypodensity throughout the left hepatic lobe measuring 8.4x7.2x7.8cm. Finding could represent a perfusional anomaly related to left hepatic vein occlusion. However, a large metastatic lesion with associated left hepatic vein thrombosis is not excluded. Additionally, there is a 1.3am hypodensity in hepatic seg 6/7, which is also new and raises concern for a small metastatic focus. \par 2.Stable postsurgical changes of total gastrectomy and esophagojejunostomy and jejunojejunostomy , jejunostomy tube in place. No evidence of recurrence. No bowel obstruction. \par 3. No pulmonary nodule, focal consolidation, pleural effusion, or pneumothorax. No thoracic lymphadenopathy. \par 4. No abdominopelvic lymphadenopathy. \par \par 2/24/21 MRI Abdomen\par 1.Gastric adenocarcinoma s/p total gastrectomy with postsurgical appearance. No suspicious nodularity/enhancement at the surgical sites to suggest local recurrence. \par 2. Since the prior to CT dated 11/3/20 there has been interval increased atrophy of the left lateral segment, with  lack of uptake notes on hepatobiliary phase imaging  in a geographic distribution, suggestive of post radiation treatment changes. There is associated diminished caliber of the left portal vein and occluded left hepatic vein. Findings are most likely related to post radiation treatment changes of the left lateral segment. Infiltrative metastasis is considered much less likely. Recommend a 3 months f/u\par 3.Interval development of a new discrete 2 cm segment 6/7 hepatic lesion since the prior to CT AP dated 11/3/20 suspicious for viable hepatic metastasis. Additional indeterminate 0.3cm segment 5 hepatic lesion for which hepatic metastasis also remains on the differential diagnosis. \par \par 3/23/21\par CT guided liver biopsy\par -Adenocarcinoma, moderately-poorly differentiated \par positive for CK7, negative for CK20, CDX-2 Hepar-1\par This pattern of staining supports the diagnosis, but is not specific in regards to site of origin. Given  patient's history of gastric cancer, this morphology and immunophenotype would be compatible with a metastasis from that site.  \par \par 5/27/21 CT AP at [facility]\par 2.1 cm x 1.9 cm right hepatic mass previously 1.3x0.9cm. 1.1cm hypodense lesion right hepatic lobe not seen previously. Previously seen 8.4cmx7.2cm ill defined hypodensity throughout the left hepatic lobe is no longer seen. Imp: right hepatic mass increased in size. New small right hepatic lesion suspicious metastasis\par \par 07/13/21 MR Abdomen w/wo IV Contrast ([facility])\par Progressive atrophy of the left lobe of the liver and metastatic lesion with hypoenhancing defect on hepatobiliary phase imaging measuring approximately 4.2 x 3.2 cm, decreased in size from February CT where it measured 8.4 x 7.2 cm and from May CT where it measured approximately 4.3 x 4.1 cm. Hypoenhancing segment 6/7 lesion measuring 2.1 cm on prior CT scan measures 1.4 cm on the current exam. Segment 6 lesion is not readily seen on current study. \par No new liver metastasis are identified. MRI Findings of hepatic iron deposition. Hemochromatosis protocol by be performed as needed. Asymmetric left hemidiaphragm elevation as above. \par \par ct DNA not detected (7/6/21, 10/12/21)\par \par 8/9/21 MRI Abdomen\par 1. Minimal interval decreased size left hepatic lobe hypoenhancing lesion now measuring 3.9cm, previously measuring 4.2cm\par 2. Interval decreased size right hepatic lobe segment 6/7 lesion measuring 1.0 cm previously measuring 1.4cm\par 3. No significant change right hepatic lobe segment 6 lesion ,measuring 0.7cm\par 4. No signs of hepatic iron deposition. \par \par 12/1/21 CT CHEST WITHOUT CONTRAST\par 1. No intrathoracic metastatic disease\par 2. For evaluation of contents of the upper abdomen, please refer to the MRI abdomen study scheduled to be performed on the same day \par \par 12/1/21 MRI ABDOMEN WITHOUT/WITH IV CONTRAST\par 1. Status post total gastrectomy and left lateral segmentectomy as well as post chemoradiation of liver metastases, currently on immunotherapy. Evolving post radiation treatment changes in the medial left hepatic lobe since the prior study dated 8/9/2021 (without discrete lesion noted on today's exam). Continued slight decrease in size of 2 right hepatic lobe metastases which measure up to 0.9 cm in segment 7 on the current study. No new liver metastases on the current study.\par 2. 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 Post radiation treatment changes to the bone marrow.  [de-identified] : Patient presents to clinic for f/u after C11 on 11/25/21. Continues to have consistent peripheral neuropathy.  Reports moderate fatigue. Weight has been stable. Overall he feels fine w/o new symptoms.   Denies any pain, SOB, chest pain, V/N/C/D, skin rash, or fever. \par \par

## 2021-12-15 NOTE — REVIEW OF SYSTEMS
[Fatigue] : fatigue [Insomnia] : insomnia [Negative] : Allergic/Immunologic [Fever] : no fever [Chills] : no chills [Night Sweats] : no night sweats [Recent Change In Weight] : ~T no recent weight change [Eye Pain] : no eye pain [Dysphagia] : no dysphagia [Odynophagia] : no odynophagia [Chest Pain] : no chest pain [Lower Ext Edema] : no lower extremity edema [Shortness Of Breath] : no shortness of breath [Wheezing] : no wheezing [Cough] : no cough [Abdominal Pain] : no abdominal pain [Vomiting] : no vomiting [Constipation] : no constipation [Diarrhea] : no diarrhea [Dysuria] : no dysuria [Muscle Weakness] : no muscle weakness [Confused] : no confusion [Dizziness] : no dizziness [Fainting] : no fainting [Difficulty Walking] : no difficulty walking [de-identified] : chronic tingling to fingers/toes,

## 2021-12-15 NOTE — ASSESSMENT
[FreeTextEntry1] : Mr. Murry is 65yr M with Stage III(ypT4b,pN2,cMo AJCC 8th ed) poorly  differentiated gastric cancer of the body and antrum with MSI-H, Her2 -ve, KRAS WT, PD- L1 expression with CPS 5, s/p 8 cycles of FLOT (4/16/20-8/12/20), Total gastrectomy with esophagojejunostomy with enblock resection of segment 2/3, periportal and D2 lymphadenectomy, feeding jejunostomy, complete omentectomy,oversewing of duodenum (9/14/20), (Xeloda)RT(12/14/20). Now with recurrent dz,isolated biopsy proven liver met segment 6. Started C1 Pembrolizumab with Dr. Mendoza at [place] on 4/20/21. He went to the ED at [facility] for pain and leakage from PEG tube on 5/27 and repeated CT AP with mixed response and PEG tube was removed after that. S/P C11 Pembro on 11/25/21. He gets Pembro at [facility] due to proximity to his home. Tolerating Tx well, symptomatically improved and CEA has been trending down. MR Abdomen (8/9/21) showing decreased in left and right lobe mets. Patient here for f/u after MRI on 12/1/21 showing continued slightly decrease in size of liver lesions.  \par \par 9/14/20\par surgical pathology report\par A. Omentum, Omentectomy: Benign omental fibroadipose tissue with chronic inflammation and reactive mesothelium\par B Lymph node, common bile duct , excision: One benign lymph node(0/1)\par C. Lymph node, Hepatic artery , excision: Metastatic adenocarcinoma involving three of three lymph node(3/3)\par D. Lymph node, portal vein excision: One benign lymph node(0/1)\par E. Stomach and Liber, Total gastrectomy and partial hepatectomy \par - Invasive poorly differentiated adenocarcinoma, measuring 8.0cm, predominantly involving gastric body and antrum\par -Tumor invades through entire thickness of gastric wall and into liver parenchyma. \par -All surgical margins( proximal ,distal, radial and liver resection margins) are free of tumor.\par - Focal lymphovascular space invasion present \par - No peripheral invasion seen\par -Metastatic adenocarcinoma involving one of twenty five lymph nodes(1/25)\par -Metastatic focus in lymph node measures 2.2cm\par -Background stomach shows intestinal metaplasia\par -AJCC 8th ed pathologic tumor stage: [id number]\par F.Lymph node, left gastric , excision:  One benign lymph node(0/1)\par G .Lymph node, Infrapancreatic, excision: Metastatic adenocarcinoma involving one of one lymph node(1/1) \par \par 2/3/21 CT CAP At [facility]\par 1.Interval development of left hepatic vein thrombosis as well as a large geographic region of hypodensity throughout the left hepatic lobe measuring 8.4x7.2x7.8cm. Finding could represent a perfusional anomaly related to left hepatic vein occlusion. However, a large metastatic lesion with associated left hepatic vein thrombosis is not excluded. Additionally, there is a 1.3am hypodensity in hepatic seg 6/7, which is also new and raises concern for a small metastatic focus. \par 2.Stable postsurgical changes of total gastrectomy and esophagojejunostomy and jejunojejunostomy , jejunostomy tube in place. No evidence of recurrence. No bowel obstruction. \par 3. No pulmonary nodule, focal consolidation, pleural effusion, or pneumothorax. No thoracic lymphadenopathy. \par 4. No abdominopelvic lymphadenopathy. \par \par 2/24/21 MRI Abdomen\par 1.Gastric adenocarcinoma s/p total gastrectomy with postsurgical appearance. No suspicious nodularity/enhancement at the surgical sites to suggest local recurrence. \par 2. Since the prior to CT dated 11/3/20 there has been interval increased atrophy of the left lateral segment, with  lack of uptake notes on hepatobiliary phase imaging  in a geographic distribution, suggestive of post radiation treatment changes. There is associated diminished caliber of the left portal vein and occluded left hepatic vein. Findings are most likely related to post radiation treatment changes of the left lateral segment. Infiltrative metastasis is considered much less likely. Recommend a 3 months f/u\par 3.Interval development of a new discrete 2 cm segment 6/7 hepatic lesion since the prior to CT AP dated 11/3/20 suspicious for viable hepatic metastasis. Additional indeterminate 0.3cm segment 5 hepatic lesion for which hepatic metastasis also remains on the differential diagnosis. \par \par 3/23/21\par CT guided liver biopsy\par -Adenocarcinoma, moderately-poorly differentiated \par positive for CK7, negative for CK20, CDX-2 Hepar-1\par This pattern of staining supports the diagnosis, but is not specific in regards to site of origin. Given  patient's history of gastric cancer, this morphology and immunophenotype would be compatible with a metastasis from that site.  \par \par 5/27/21 CT AP at [facility]\par 2.1 cm x 1.9 cm right hepatic mass previously 1.3x0.9cm. 1.1cm hypodense lesion right hepatic lobe not seen previously. Previously seen 8.4cmx7.2cm ill defined hypodensity throughout the left hepatic lobe is no longer seen. Imp: right hepatic mass increased in size. New small right hepatic lesion suspicious metastasis.\par \par 07/13/21 MR Abdomen w/wo IV Contrast ([facility])\par Progressive atrophy of the left lobe of the liver and metastatic lesion with hypoenhancing defect on hepatobiliary phase imaging measuring approximately 4.2 x 3.2 cm, decreased in size from February CT where it measured 8.4 x 7.2 cm and from May CT where it measured approximately 4.3 x 4.1 cm. Hypoenhancing segment 6/7 lesion measuring 2.1 cm on prior CT scan measures 1.4 cm on the current exam. Segment 6 lesion is not readily seen on current study. \par No new liver metastasis are identified. MRI Findings of hepatic iron deposition. Hemochromatosis protocol by be performed as needed. Asymmetric left hemidiaphragm elevation as above. \par \par ct DNA not detected (7/6/21, 10/12/21)\par \par 8/9/21 MRI Abdomen\par 1. Minimal interval decreased size left hepatic lobe hypoenhancing lesion now measuring 3.9cm, previously measuring 4.2cm\par 2. Interval decreased size right hepatic lobe segment 6/7 lesion measuring 1.0 cm previously measuring 1.4cm\par 3. No significant change right hepatic lobe segment 6 lesion ,measuring 0.7cm\par 4. No signs of hepatic iron deposition. \par \par 12/1/21 CT CHEST WITHOUT CONTRAST\par 1. No intrathoracic metastatic disease\par 2. For evaluation of contents of the upper abdomen, please refer to the MRI abdomen study scheduled to be performed on the same day \par \par 12/1/21 MRI ABDOMEN WITHOUT/WITH IV CONTRAST\par 1. Status post total gastrectomy and left lateral segmentectomy as well as post chemoradiation of liver metastases, currently on immunotherapy. Evolving post radiation treatment changes in the medial left hepatic lobe since the prior study dated 8/9/2021 (without discrete lesion noted on today's exam). Continued slight decrease in size of 2 right hepatic lobe metastases which measure up to 0.9 cm in segment 7 on the current study. No new liver metastases on the current study.\par 2. 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 Post radiation treatment changes to the bone marrow. \par \par Plan\par # Stage III(ypT4b,pN2,cMo AJCC 8th ed) MSI-H, Her2 -ve, KRAS-ve, PD- L1 expression CPS 5  Gastric cancer, now with recurrent dz with liver mets\par -s/p C8 FLOT, Total gastrectomy with esophagojejunostomy, Xeloda/RT completed 12/20. Noted to have local and hep recurrence\par -Started C1 Pembrolizumab with Dr. Mendoza at [place] on 4/20/21. CT AP on 5/27 with mixed response. MR Abdomen/Pelvis on 8/9/21 showing decrease in size of liver mets.  MRI on 12/1/21 showing continued slightly decrease in size of liver w/o new disease. Status post total gastrectomy and left lateral segmentectomy as well as post chemoradiation of liver metastases, currently on immunotherapy. Evolving post radiation treatment changes in the medial left hepatic lobe since the prior study dated 8/9/2021 (without discrete lesion noted on today's exam). Continued slight decrease in size of 2 right hepatic lobe metastases which measure up to 0.9 cm in segment 7 on the current study. No new liver metastases on the current study.\par - Received C11 Pembro on 11/25/21, C12 will be on 12/17, ct Pembro \par - Saw surgeon at [facility] and not candidate for surgery  at this time. would continue treatment for 2- 3 months and reevaluate. \par - SW f/u\par - Will repeat CBC, CMP, CEA today.  CEA  4.8(11/10/21),5.7(10/12/21), 6.1(9/14/21),  7 ( 8/25/21),  42.3 (06/14/21) and 253 (05/05/21). \par -ct DNA not detected (7/6/21, 10/12/21) WIll repeat at next visit. \par -Check CBC, CMP, CEA, B12, fe studies, Vit D,and  TSH\par - Will repeat PET/CT in 3 months \par \par # Peripheral neuropathy/confusion \par - Continue gabapentin as per Dr. Tavarez. \par \par \par RTC in 6 weeks

## 2021-12-16 LAB
25(OH)D3 SERPL-MCNC: 35.5 NG/ML
CEA SERPL-MCNC: 6.5 NG/ML
FOLATE SERPL-MCNC: 10.2 NG/ML
VIT B12 SERPL-MCNC: 284 PG/ML

## 2022-01-26 ENCOUNTER — LABORATORY RESULT (OUTPATIENT)
Age: 67
End: 2022-01-26

## 2022-01-26 ENCOUNTER — APPOINTMENT (OUTPATIENT)
Dept: HEMATOLOGY ONCOLOGY | Facility: CLINIC | Age: 67
End: 2022-01-26
Payer: MEDICAID

## 2022-01-26 VITALS
HEART RATE: 91 BPM | WEIGHT: 128 LBS | DIASTOLIC BLOOD PRESSURE: 59 MMHG | BODY MASS INDEX: 18.32 KG/M2 | SYSTOLIC BLOOD PRESSURE: 96 MMHG | OXYGEN SATURATION: 98 % | RESPIRATION RATE: 18 BRPM | HEIGHT: 70 IN | TEMPERATURE: 96.8 F

## 2022-01-26 DIAGNOSIS — Z29.8 ENCOUNTER FOR OTHER SPECIFIED PROPHYLACTIC MEASURES: ICD-10-CM

## 2022-01-26 LAB
ALBUMIN SERPL ELPH-MCNC: 3.5 G/DL
ALP BLD-CCNC: 131 U/L
ALT SERPL-CCNC: 57 U/L
ANION GAP SERPL CALC-SCNC: 5 MMOL/L
AST SERPL-CCNC: 50 U/L
BILIRUB SERPL-MCNC: 0.7 MG/DL
BUN SERPL-MCNC: 13 MG/DL
CALCIUM SERPL-MCNC: 9.1 MG/DL
CHLORIDE SERPL-SCNC: 110 MMOL/L
CO2 SERPL-SCNC: 30 MMOL/L
CREAT SERPL-MCNC: 0.7 MG/DL
GLUCOSE SERPL-MCNC: 104 MG/DL
POTASSIUM SERPL-SCNC: 4.2 MMOL/L
PROT SERPL-MCNC: 6.5 G/DL
SODIUM SERPL-SCNC: 145 MMOL/L

## 2022-01-26 PROCEDURE — 99214 OFFICE O/P EST MOD 30 MIN: CPT

## 2022-01-26 NOTE — ASSESSMENT
[FreeTextEntry1] : Mr. Murry is 66yr M with Stage III(ypT4b,pN2,cMo AJCC 8th ed) poorly  differentiated gastric cancer of the body and antrum with MSI-H, Her2 -ve, KRAS WT, PD- L1 expression with CPS 5, s/p 8 cycles of FLOT (4/16/20-8/12/20), Total gastrectomy with esophagojejunostomy with enblock resection of segment 2/3, periportal and D2 lymphadenectomy, feeding jejunostomy, complete omentectomy,oversewing of duodenum (9/14/20), (Xeloda)RT(12/14/20). Now with recurrent dz,isolated biopsy proven liver met segment 6. Started C1 Pembrolizumab with Dr. Mendoza at [facility] on 4/20/21. He went to the ED at [facility] for pain and leakage from PEG tube on 5/27 and repeated CT AP with mixed response and PEG tube was removed after that. S/P C13 Pembro on 1/7/22. He gets Pembro at [facility] due to proximity to his home. Tolerating Tx well, symptomatically improved and CEA has been trending down. MR Abdomen (8/9/21) showing decreased in left and right lobe mets. Continued slight decrease in size of 2 right hepatic lobe metastases which measure up to 0.9 cm in segment 7 on the current study. No new liver metastases on MRI in December 2021. Patient here for f/u \par \par 9/14/20\par surgical pathology report\par A. Omentum, Omentectomy: Benign omental fibroadipose tissue with chronic inflammation and reactive mesothelium\par B Lymph node, common bile duct , excision: One benign lymph node(0/1)\par C. Lymph node, Hepatic artery , excision: Metastatic adenocarcinoma involving three of three lymph node(3/3)\par D. Lymph node, portal vein excision: One benign lymph node(0/1)\par E. Stomach and Liber, Total gastrectomy and partial hepatectomy \par - Invasive poorly differentiated adenocarcinoma, measuring 8.0cm, predominantly involving gastric body and antrum\par -Tumor invades through entire thickness of gastric wall and into liver parenchyma. \par -All surgical margins( proximal ,distal, radial and liver resection margins) are free of tumor.\par - Focal lymphovascular space invasion present \par - No peripheral invasion seen\par -Metastatic adenocarcinoma involving one of twenty five lymph nodes(1/25)\par -Metastatic focus in lymph node measures 2.2cm\par -Background stomach shows intestinal metaplasia\par -AJCC 8th ed pathologic tumor stage: [id number]\par F.Lymph node, left gastric , excision:  One benign lymph node(0/1)\par G .Lymph node, Infrapancreatic, excision: Metastatic adenocarcinoma involving one of one lymph node(1/1) \par \par 2/3/21 CT CAP At [facility]\par 1.Interval development of left hepatic vein thrombosis as well as a large geographic region of hypodensity throughout the left hepatic lobe measuring 8.4x7.2x7.8cm. Finding could represent a perfusional anomaly related to left hepatic vein occlusion. However, a large metastatic lesion with associated left hepatic vein thrombosis is not excluded. Additionally, there is a 1.3am hypodensity in hepatic seg 6/7, which is also new and raises concern for a small metastatic focus. \par 2.Stable postsurgical changes of total gastrectomy and esophagojejunostomy and jejunojejunostomy , jejunostomy tube in place. No evidence of recurrence. No bowel obstruction. \par 3. No pulmonary nodule, focal consolidation, pleural effusion, or pneumothorax. No thoracic lymphadenopathy. \par 4. No abdominopelvic lymphadenopathy. \par \par 2/24/21 MRI Abdomen\par 1.Gastric adenocarcinoma s/p total gastrectomy with postsurgical appearance. No suspicious nodularity/enhancement at the surgical sites to suggest local recurrence. \par 2. Since the prior to CT dated 11/3/20 there has been interval increased atrophy of the left lateral segment, with  lack of uptake notes on hepatobiliary phase imaging  in a geographic distribution, suggestive of post radiation treatment changes. There is associated diminished caliber of the left portal vein and occluded left hepatic vein. Findings are most likely related to post radiation treatment changes of the left lateral segment. Infiltrative metastasis is considered much less likely. Recommend a 3 months f/u\par 3.Interval development of a new discrete 2 cm segment 6/7 hepatic lesion since the prior to CT AP dated 11/3/20 suspicious for viable hepatic metastasis. Additional indeterminate 0.3cm segment 5 hepatic lesion for which hepatic metastasis also remains on the differential diagnosis. \par \par 3/23/21\par CT guided liver biopsy\par -Adenocarcinoma, moderately-poorly differentiated \par positive for CK7, negative for CK20, CDX-2 Hepar-1\par This pattern of staining supports the diagnosis, but is not specific in regards to site of origin. Given  patient's history of gastric cancer, this morphology and immunophenotype would be compatible with a metastasis from that site.  \par \par 5/27/21 CT AP at [facility]\par 2.1 cm x 1.9 cm right hepatic mass previously 1.3x0.9cm. 1.1cm hypodense lesion right hepatic lobe not seen previously. Previously seen 8.4cmx7.2cm ill defined hypodensity throughout the left hepatic lobe is no longer seen. Imp: right hepatic mass increased in size. New small right hepatic lesion suspicious metastasis.\par \par 07/13/21 MR Abdomen w/wo IV Contrast ([facility])\par Progressive atrophy of the left lobe of the liver and metastatic lesion with hypoenhancing defect on hepatobiliary phase imaging measuring approximately 4.2 x 3.2 cm, decreased in size from February CT where it measured 8.4 x 7.2 cm and from May CT where it measured approximately 4.3 x 4.1 cm. Hypoenhancing segment 6/7 lesion measuring 2.1 cm on prior CT scan measures 1.4 cm on the current exam. Segment 6 lesion is not readily seen on current study. \par No new liver metastasis are identified. MRI Findings of hepatic iron deposition. Hemochromatosis protocol by be performed as needed. Asymmetric left hemidiaphragm elevation as above. \par \par 8/9/21 MRI Abdomen\par 1. Minimal interval decreased size left hepatic lobe hypoenhancing lesion now measuring 3.9cm, previously measuring 4.2cm\par 2. Interval decreased size right hepatic lobe segment 6/7 lesion measuring 1.0 cm previously measuring 1.4cm\par 3. No significant change right hepatic lobe segment 6 lesion ,measuring 0.7cm\par 4. No signs of hepatic iron deposition. \par \par 12/1/21 CT CHEST WITHOUT CONTRAST\par 1. No intrathoracic metastatic disease\par 2. For evaluation of contents of the upper abdomen, please refer to the MRI abdomen study scheduled to be performed on the same day \par \par 12/1/21 MRI ABDOMEN WITHOUT/WITH IV CONTRAST\par 1. Status post total gastrectomy and left lateral segmentectomy as well as post chemoradiation of liver metastases, currently on immunotherapy. Evolving post radiation treatment changes in the medial left hepatic lobe since the prior study dated 8/9/2021 (without discrete lesion noted on today's exam). Continued slight decrease in size of 2 right hepatic lobe metastases which measure up to 0.9 cm in segment 7 on the current study. No new liver metastases on the current study.\par 2. 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 Post radiation treatment changes to the bone marrow. \par \par ct DNA not detected (7/6/21, 10/12/21)\par \par Plan\par # Stage III(ypT4b,pN2,cMo AJCC 8th ed) MSI-H, Her2 -ve, KRAS-ve, PD- L1 expression CPS 5  Gastric cancer, now with recurrent dz with liver mets\par -s/p C8 FLOT, Total gastrectomy with esophagojejunostomy, Xeloda/RT completed 12/20. Noted to have local and hep recurrence\par -Started C1 Pembrolizumab with Dr. Mendoza at [facility] on 4/20/21. CT AP on 5/27 with mixed response. MR Abdomen/Pelvis on 8/9/21 showing decrease in size of liver mets.  MRI on 12/1/21 showing continued slightly decrease in size of liver w/o new disease. Status post total gastrectomy and left lateral segmentectomy as well as post chemoradiation of liver metastases, currently on immunotherapy. Evolving post radiation treatment changes in the medial left hepatic lobe since the prior study dated 8/9/2021 (without discrete lesion noted on today's exam). Continued slight decrease in size of 2 right hepatic lobe metastases which measure up to 0.9 cm in segment 7 on the current study. No new liver metastases on the current study.\par - Received C13 Pembro on 1/7/22 ct Pembro  C14 will be on 1/28. \par - Saw surgeon at [facility] and not candidate for surgery  at this time. would continue treatment for 2- 3 months and reevaluate. \par - SW f/u\par - Will repeat CBC, CMP, CEA today.  CEA  6.5 (12/15/21), 4.8(11/10/21),5.7(10/12/21), 6.1(9/14/21),  7 ( 8/25/21),  42.3 (06/14/21) and 253 (05/05/21). \par -ct DNA not detected (7/6/21, 10/12/21) WIll send today.\par -Check CBC, CMP, CEA, B12, Vit D,and  TSH\par - Will repeat PET/CT in 3 months \par \par # Peripheral neuropathy/confusion \par - Continue Lyrica as per Dr. Tavarez. \par \par RTC  on 2/23

## 2022-01-26 NOTE — PHYSICAL EXAM
[Restricted in physically strenuous activity but ambulatory and able to carry out work of a light or sedentary nature] : Status 1- Restricted in physically strenuous activity but ambulatory and able to carry out work of a light or sedentary nature, e.g., light house work, office work [Thin] : thin [Normal] : normoactive bowel sounds, soft and nontender, no hepatosplenomegaly or masses appreciated

## 2022-01-26 NOTE — REVIEW OF SYSTEMS
[Fatigue] : fatigue [Insomnia] : insomnia [Negative] : Allergic/Immunologic [Diarrhea] : diarrhea [Fever] : no fever [Chills] : no chills [Night Sweats] : no night sweats [Recent Change In Weight] : ~T no recent weight change [Eye Pain] : no eye pain [Dysphagia] : no dysphagia [Odynophagia] : no odynophagia [Chest Pain] : no chest pain [Lower Ext Edema] : no lower extremity edema [Shortness Of Breath] : no shortness of breath [Wheezing] : no wheezing [Cough] : no cough [Abdominal Pain] : no abdominal pain [Vomiting] : no vomiting [Constipation] : no constipation [Dysuria] : no dysuria [Muscle Weakness] : no muscle weakness [Confused] : no confusion [Dizziness] : no dizziness [Fainting] : no fainting [Difficulty Walking] : no difficulty walking [FreeTextEntry7] : nausea at times [de-identified] : chronic tingling to fingers/toes

## 2022-01-26 NOTE — HISTORY OF PRESENT ILLNESS
[de-identified] : Mr. Murry is 66yr M with Stage III(ypT4b,pN2,cMo AJCC 8th ed) poorly  differentiated gastric cancer of the body and antrum with MSI-H, Her2 -ve, KRAS WT, PD- L1 expression with CPS 5, s/p 8 cycles of FLOT (4/16/20-8/12/20), Total gastrectomy with esophagojejunostomy with enblock resection of segment 2/3, periportal and D2 lymphadenectomy, feeding jejunostomy, complete omentectomy,oversewing of duodenum (9/14/20), (Xeloda)RT(12/14/20). Now with recurrent dz,isolated biopsy proven liver met segment 6. Started C1 Pembrolizumab with Dr. Mendoza at [place] on 4/20/21. He went to the ED at [facility] for pain and leakage from PEG tube on 5/27 and repeated CT AP with mixed response and PEG tube was removed after that. S/P C13 Pembro on 1/7/22.  He gets Pembro at [facility] due to proximity to his home. Tolerating Tx well, symptomatically improved and CEA has been trending down. MR Abdomen (8/9/21) showing decreased in left and right lobe mets.Continued slight decrease in size of 2 right hepatic lobe metastases which measure up to 0.9 cm in segment 7 on the current study. No new liver metastases on MRI dated in December 2021.  Patient here for f/u   \par \par He was diagnosed with  Stage III(ypT4b,pN2,cMo) poorly  differentiated gastric cancer of the body and antrum, Her2 negative. He completed 8 cycles of FLOT chemotherapy 4/16/20-8/12/20  then had total gastrectomy with esophagojejunostomy with enblock resection of segment 2/3, periportal and D2 lymphadenectomy, feeding jejunostomy, complete omentectomy,oversewing of duodenum on 9/14/20 and completed post-op chemo(Xeloda)RT 12/14/20 at [facility]. Surveillance CT AP on 2/3/21 showed interval development of left hepatic vein thrombosis as well as a large geographic region of hypodensity throughout the left hepatic lobe measuring 8.4x7.2x7.8 cm . He underwent liver biopsy on 3/23/21, path confirmed moderately differentiated adenocarcinoma, positive for CK7, negative for CK20, CDX-2 , Hepar-1.  His oncologist at [facility] is suggesting he has Immunotherapy. He has sought second opinion from [facility] and is now here for a third opinion. He is reluctant to offer in depth information regarding Phx.\par \par 9/14/20\par surgical pathology report\par A. Omentum, Omentectomy: Benign omental fibroadipose tissue with chronic inflammation and reactive mesothelium\par B Lymph node, common bile duct , excision: One benign lymph node(0/1)\par C. Lymph node, Hepatic artery , excision: Metastatic adenocarcinoma involving three of three lymph node(3/3)\par D. Lymph node, portal vein excision: One benign lymph node(0/1)\par E. Stomach and Liber, Total gastrectomy and partial hepatectomy \par - Invasive poorly differentiated adenocarcinoma, measuring 8.0cm, predominantly involving gastric body and antrum\par -Tumor invades through entire thickness of gastric wall and into liver parenchyma. \par -All surgical margins( proximal ,distal, radial and liver resection margins) are free of tumor.\par - Focal lymphovascular space invasion present \par - No peripheral invasion seen\par -Metastatic adenocarcinoma involving one of twenty five lymph nodes(1/25)\par -Metastatic focus in lymph node measures 2.2cm\par -Background stomach shows intestinal metaplasia\par -AJCC 8th ed pathologic tumor stage: [id number]\par F.Lymph node, left gastric , excision:  One benign lymph node(0/1)\par G .Lymph node, Infrapancreatic, excision: Metastatic adenocarcinoma involving one of one lymph node(1/1) \par \par 2/3/21 CT CAP At [facility]\par 1.Interval development of left hepatic vein thrombosis as well as a large geographic region of hypodensity throughout the left hepatic lobe measuring 8.4x7.2x7.8cm. Finding could represent a perfusional anomaly related to left hepatic vein occlusion. However, a large metastatic lesion with associated left hepatic vein thrombosis is not excluded. Additionally, there is a 1.3am hypodensity in hepatic seg 6/7, which is also new and raises concern for a small metastatic focus. \par 2.Stable postsurgical changes of total gastrectomy and esophagojejunostomy and jejunojejunostomy , jejunostomy tube in place. No evidence of recurrence. No bowel obstruction. \par 3. No pulmonary nodule, focal consolidation, pleural effusion, or pneumothorax. No thoracic lymphadenopathy. \par 4. No abdominopelvic lymphadenopathy. \par \par 2/24/21 MRI Abdomen\par 1.Gastric adenocarcinoma s/p total gastrectomy with postsurgical appearance. No suspicious nodularity/enhancement at the surgical sites to suggest local recurrence. \par 2. Since the prior to CT dated 11/3/20 there has been interval increased atrophy of the left lateral segment, with  lack of uptake notes on hepatobiliary phase imaging  in a geographic distribution, suggestive of post radiation treatment changes. There is associated diminished caliber of the left portal vein and occluded left hepatic vein. Findings are most likely related to post radiation treatment changes of the left lateral segment. Infiltrative metastasis is considered much less likely. Recommend a 3 months f/u\par 3.Interval development of a new discrete 2 cm segment 6/7 hepatic lesion since the prior to CT AP dated 11/3/20 suspicious for viable hepatic metastasis. Additional indeterminate 0.3cm segment 5 hepatic lesion for which hepatic metastasis also remains on the differential diagnosis. \par \par 3/23/21\par CT guided liver biopsy\par -Adenocarcinoma, moderately-poorly differentiated \par positive for CK7, negative for CK20, CDX-2 Hepar-1\par This pattern of staining supports the diagnosis, but is not specific in regards to site of origin. Given  patient's history of gastric cancer, this morphology and immunophenotype would be compatible with a metastasis from that site.  \par \par 5/27/21 CT AP at [facility]\par 2.1 cm x 1.9 cm right hepatic mass previously 1.3x0.9cm. 1.1cm hypodense lesion right hepatic lobe not seen previously. Previously seen 8.4cmx7.2cm ill defined hypodensity throughout the left hepatic lobe is no longer seen. Imp: right hepatic mass increased in size. New small right hepatic lesion suspicious metastasis\par \par 07/13/21 MR Abdomen w/wo IV Contrast ([facility])\par Progressive atrophy of the left lobe of the liver and metastatic lesion with hypoenhancing defect on hepatobiliary phase imaging measuring approximately 4.2 x 3.2 cm, decreased in size from February CT where it measured 8.4 x 7.2 cm and from May CT where it measured approximately 4.3 x 4.1 cm. Hypoenhancing segment 6/7 lesion measuring 2.1 cm on prior CT scan measures 1.4 cm on the current exam. Segment 6 lesion is not readily seen on current study. \par No new liver metastasis are identified. MRI Findings of hepatic iron deposition. Hemochromatosis protocol by be performed as needed. Asymmetric left hemidiaphragm elevation as above. \par \par 8/9/21 MRI Abdomen\par 1. Minimal interval decreased size left hepatic lobe hypoenhancing lesion now measuring 3.9cm, previously measuring 4.2cm\par 2. Interval decreased size right hepatic lobe segment 6/7 lesion measuring 1.0 cm previously measuring 1.4cm\par 3. No significant change right hepatic lobe segment 6 lesion ,measuring 0.7cm\par 4. No signs of hepatic iron deposition. \par \par 12/1/21 CT CHEST WITHOUT CONTRAST\par 1. No intrathoracic metastatic disease\par 2. For evaluation of contents of the upper abdomen, please refer to the MRI abdomen study scheduled to be performed on the same day \par \par 12/1/21 MRI ABDOMEN WITHOUT/WITH IV CONTRAST\par 1. Status post total gastrectomy and left lateral segmentectomy as well as post chemoradiation of liver metastases, currently on immunotherapy. Evolving post radiation treatment changes in the medial left hepatic lobe since the prior study dated 8/9/2021 (without discrete lesion noted on today's exam). Continued slight decrease in size of 2 right hepatic lobe metastases which measure up to 0.9 cm in segment 7 on the current study. No new liver metastases on the current study.\par 2. 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 Post radiation treatment changes to the bone marrow. \par \par ct DNA not detected (7/6/21, 10/12/21) [de-identified] : Patient presents to clinic for f/u. Continues to have consistent peripheral neuropathy.  Reports moderate fatigue. Patient states he has diarrhea/nausea occasionally. Denies any pain, SOB, chest pain, N/C, skin rash, or fever. \par \par

## 2022-01-27 LAB
FOLATE SERPL-MCNC: 12.2 NG/ML
VIT B12 SERPL-MCNC: 464 PG/ML

## 2022-01-31 LAB
25(OH)D3 SERPL-MCNC: 36 NG/ML
CEA SERPL-MCNC: 7.1 NG/ML
TSH SERPL-ACNC: 1.2 UIU/ML

## 2022-02-18 NOTE — ASSESSMENT
[FreeTextEntry1] : Mr. Murry is 66yr M with Stage III(ypT4b,pN2,cMo AJCC 8th ed) poorly  differentiated gastric cancer of the body and antrum with MSI-H, Her2 -ve, KRAS WT, PD- L1 expression with CPS 5, s/p 8 cycles of FLOT (4/16/20-8/12/20), Total gastrectomy with esophagojejunostomy with enblock resection of segment 2/3, periportal and D2 lymphadenectomy, feeding jejunostomy, complete omentectomy,oversewing of duodenum (9/14/20), (Xeloda)RT(12/14/20). Now with recurrent dz,isolated biopsy proven liver met segment 6. Started C1 Pembrolizumab with Dr. Mendoza at [place] on 4/20/21. He went to the ED at [facility] for pain and leakage from PEG tube on 5/27 and repeated CT AP with mixed response and PEG tube was removed after that. S/P C13 Pembro on 1/7/22. He gets Pembro at [facility] due to proximity to his home. Tolerating Tx well, symptomatically improved and CEA has been trending down. MR Abdomen (8/9/21) showing decreased in left and right lobe mets. Continued slight decrease in size of 2 right hepatic lobe metastases which measure up to 0.9 cm in segment 7 on the current study. No new liver metastases on MRI in December 2021. Patient here for f/u \par \par 9/14/20\par surgical pathology report\par A. Omentum, Omentectomy: Benign omental fibroadipose tissue with chronic inflammation and reactive mesothelium\par B Lymph node, common bile duct , excision: One benign lymph node(0/1)\par C. Lymph node, Hepatic artery , excision: Metastatic adenocarcinoma involving three of three lymph node(3/3)\par D. Lymph node, portal vein excision: One benign lymph node(0/1)\par E. Stomach and Liber, Total gastrectomy and partial hepatectomy \par - Invasive poorly differentiated adenocarcinoma, measuring 8.0cm, predominantly involving gastric body and antrum\par -Tumor invades through entire thickness of gastric wall and into liver parenchyma. \par -All surgical margins( proximal ,distal, radial and liver resection margins) are free of tumor.\par - Focal lymphovascular space invasion present \par - No peripheral invasion seen\par -Metastatic adenocarcinoma involving one of twenty five lymph nodes(1/25)\par -Metastatic focus in lymph node measures 2.2cm\par -Background stomach shows intestinal metaplasia\par -AJCC 8th ed pathologic tumor stage: [id number]\par F.Lymph node, left gastric , excision:  One benign lymph node(0/1)\par G .Lymph node, Infrapancreatic, excision: Metastatic adenocarcinoma involving one of one lymph node(1/1) \par \par 2/3/21 CT CAP At [facility]\par 1.Interval development of left hepatic vein thrombosis as well as a large geographic region of hypodensity throughout the left hepatic lobe measuring 8.4x7.2x7.8cm. Finding could represent a perfusional anomaly related to left hepatic vein occlusion. However, a large metastatic lesion with associated left hepatic vein thrombosis is not excluded. Additionally, there is a 1.3am hypodensity in hepatic seg 6/7, which is also new and raises concern for a small metastatic focus. \par 2.Stable postsurgical changes of total gastrectomy and esophagojejunostomy and jejunojejunostomy , jejunostomy tube in place. No evidence of recurrence. No bowel obstruction. \par 3. No pulmonary nodule, focal consolidation, pleural effusion, or pneumothorax. No thoracic lymphadenopathy. \par 4. No abdominopelvic lymphadenopathy. \par \par 2/24/21 MRI Abdomen\par 1.Gastric adenocarcinoma s/p total gastrectomy with postsurgical appearance. No suspicious nodularity/enhancement at the surgical sites to suggest local recurrence. \par 2. Since the prior to CT dated 11/3/20 there has been interval increased atrophy of the left lateral segment, with  lack of uptake notes on hepatobiliary phase imaging  in a geographic distribution, suggestive of post radiation treatment changes. There is associated diminished caliber of the left portal vein and occluded left hepatic vein. Findings are most likely related to post radiation treatment changes of the left lateral segment. Infiltrative metastasis is considered much less likely. Recommend a 3 months f/u\par 3.Interval development of a new discrete 2 cm segment 6/7 hepatic lesion since the prior to CT AP dated 11/3/20 suspicious for viable hepatic metastasis. Additional indeterminate 0.3cm segment 5 hepatic lesion for which hepatic metastasis also remains on the differential diagnosis. \par \par 3/23/21\par CT guided liver biopsy\par -Adenocarcinoma, moderately-poorly differentiated \par positive for CK7, negative for CK20, CDX-2 Hepar-1\par This pattern of staining supports the diagnosis, but is not specific in regards to site of origin. Given  patient's history of gastric cancer, this morphology and immunophenotype would be compatible with a metastasis from that site.  \par \par 5/27/21 CT AP at [facility]\par 2.1 cm x 1.9 cm right hepatic mass previously 1.3x0.9cm. 1.1cm hypodense lesion right hepatic lobe not seen previously. Previously seen 8.4cmx7.2cm ill defined hypodensity throughout the left hepatic lobe is no longer seen. Imp: right hepatic mass increased in size. New small right hepatic lesion suspicious metastasis.\par \par 07/13/21 MR Abdomen w/wo IV Contrast ([facility])\par Progressive atrophy of the left lobe of the liver and metastatic lesion with hypoenhancing defect on hepatobiliary phase imaging measuring approximately 4.2 x 3.2 cm, decreased in size from February CT where it measured 8.4 x 7.2 cm and from May CT where it measured approximately 4.3 x 4.1 cm. Hypoenhancing segment 6/7 lesion measuring 2.1 cm on prior CT scan measures 1.4 cm on the current exam. Segment 6 lesion is not readily seen on current study. \par No new liver metastasis are identified. MRI Findings of hepatic iron deposition. Hemochromatosis protocol by be performed as needed. Asymmetric left hemidiaphragm elevation as above. \par \par 8/9/21 MRI Abdomen\par 1. Minimal interval decreased size left hepatic lobe hypoenhancing lesion now measuring 3.9cm, previously measuring 4.2cm\par 2. Interval decreased size right hepatic lobe segment 6/7 lesion measuring 1.0 cm previously measuring 1.4cm\par 3. No significant change right hepatic lobe segment 6 lesion ,measuring 0.7cm\par 4. No signs of hepatic iron deposition. \par \par 12/1/21 CT CHEST WITHOUT CONTRAST\par 1. No intrathoracic metastatic disease\par 2. For evaluation of contents of the upper abdomen, please refer to the MRI abdomen study scheduled to be performed on the same day \par \par 12/1/21 MRI ABDOMEN WITHOUT/WITH IV CONTRAST\par 1. Status post total gastrectomy and left lateral segmentectomy as well as post chemoradiation of liver metastases, currently on immunotherapy. Evolving post radiation treatment changes in the medial left hepatic lobe since the prior study dated 8/9/2021 (without discrete lesion noted on today's exam). Continued slight decrease in size of 2 right hepatic lobe metastases which measure up to 0.9 cm in segment 7 on the current study. No new liver metastases on the current study.\par 2. 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 Post radiation treatment changes to the bone marrow. \par \par ct DNA not detected (7/6/21, 10/12/21)\par \par Plan\par # Stage III(ypT4b,pN2,cMo AJCC 8th ed) MSI-H, Her2 -ve, KRAS-ve, PD- L1 expression CPS 5  Gastric cancer, now with recurrent dz with liver mets\par -s/p C8 FLOT, Total gastrectomy with esophagojejunostomy, Xeloda/RT completed 12/20. Noted to have local and hep recurrence\par -Started C1 Pembrolizumab with Dr. Mendoza at [place] on 4/20/21. CT AP on 5/27 with mixed response. MR Abdomen/Pelvis on 8/9/21 showing decrease in size of liver mets.  MRI on 12/1/21 showing continued slightly decrease in size of liver w/o new disease. Status post total gastrectomy and left lateral segmentectomy as well as post chemoradiation of liver metastases, currently on immunotherapy. Evolving post radiation treatment changes in the medial left hepatic lobe since the prior study dated 8/9/2021 (without discrete lesion noted on today's exam). Continued slight decrease in size of 2 right hepatic lobe metastases which measure up to 0.9 cm in segment 7 on the current study. No new liver metastases on the current study.\par - Received C13 Pembro on 1/7/22 ct Pembro  C14 will be on 1/28. \par - Saw surgeon at [facility] and not candidate for surgery  at this time. would continue treatment for 2- 3 months and reevaluate. \par - SW f/u\par - Will repeat CBC, CMP, CEA today.  CEA  7.1 (1/27/22), 6.5 (12/15/21), 4.8(11/10/21),5.7(10/12/21), 6.1(9/14/21),  7 ( 8/25/21),  42.3 (06/14/21) and 253 (05/05/21). \par -ct DNA not detected (7/6/21, 10/12/21) WIll send today.\par -Check CBC, CMP, CEA, B12, Vit D,and  TSH\par - Will repeat PET/CT in 3 months \par \par # Peripheral neuropathy/confusion \par - Continue Lyrica as per Dr. Tavarez. \par \par RTC  on 2/23

## 2022-02-18 NOTE — HISTORY OF PRESENT ILLNESS
[de-identified] : Mr. Murry is 66yr M with Stage III(ypT4b,pN2,cMo AJCC 8th ed) poorly  differentiated gastric cancer of the body and antrum with MSI-H, Her2 -ve, KRAS WT, PD- L1 expression with CPS 5, s/p 8 cycles of FLOT (4/16/20-8/12/20), Total gastrectomy with esophagojejunostomy with enblock resection of segment 2/3, periportal and D2 lymphadenectomy, feeding jejunostomy, complete omentectomy,oversewing of duodenum (9/14/20), (Xeloda)RT(12/14/20). Now with recurrent dz,isolated biopsy proven liver met segment 6. Started C1 Pembrolizumab with Dr. Mendoza at [place] on 4/20/21. He went to the ED at [facility] for pain and leakage from PEG tube on 5/27 and repeated CT AP with mixed response and PEG tube was removed after that. S/P C13 Pembro on 1/7/22.  He gets Pembro at [facility] due to proximity to his home. Tolerating Tx well, symptomatically improved and CEA has been trending down. MR Abdomen (8/9/21) showing decreased in left and right lobe mets.Continued slight decrease in size of 2 right hepatic lobe metastases which measure up to 0.9 cm in segment 7 on the current study. No new liver metastases on MRI dated in December 2021.  Patient here for f/u   \par \par He was diagnosed with  Stage III(ypT4b,pN2,cMo) poorly  differentiated gastric cancer of the body and antrum, Her2 negative. He completed 8 cycles of FLOT chemotherapy 4/16/20-8/12/20  then had total gastrectomy with esophagojejunostomy with enblock resection of segment 2/3, periportal and D2 lymphadenectomy, feeding jejunostomy, complete omentectomy,oversewing of duodenum on 9/14/20 and completed post-op chemo(Xeloda)RT 12/14/20 at [facility]. Surveillance CT AP on 2/3/21 showed interval development of left hepatic vein thrombosis as well as a large geographic region of hypodensity throughout the left hepatic lobe measuring 8.4x7.2x7.8 cm . He underwent liver biopsy on 3/23/21, path confirmed moderately differentiated adenocarcinoma, positive for CK7, negative for CK20, CDX-2 , Hepar-1.  His oncologist at [facility] is suggesting he has Immunotherapy. He has sought second opinion from [facility] and is now here for a third opinion. He is reluctant to offer in depth information regarding Phx.\par \par 9/14/20\par surgical pathology report\par A. Omentum, Omentectomy: Benign omental fibroadipose tissue with chronic inflammation and reactive mesothelium\par B Lymph node, common bile duct , excision: One benign lymph node(0/1)\par C. Lymph node, Hepatic artery , excision: Metastatic adenocarcinoma involving three of three lymph node(3/3)\par D. Lymph node, portal vein excision: One benign lymph node(0/1)\par E. Stomach and Liber, Total gastrectomy and partial hepatectomy \par - Invasive poorly differentiated adenocarcinoma, measuring 8.0cm, predominantly involving gastric body and antrum\par -Tumor invades through entire thickness of gastric wall and into liver parenchyma. \par -All surgical margins( proximal ,distal, radial and liver resection margins) are free of tumor.\par - Focal lymphovascular space invasion present \par - No peripheral invasion seen\par -Metastatic adenocarcinoma involving one of twenty five lymph nodes(1/25)\par -Metastatic focus in lymph node measures 2.2cm\par -Background stomach shows intestinal metaplasia\par -AJCC 8th ed pathologic tumor stage: [id number]\par F.Lymph node, left gastric , excision:  One benign lymph node(0/1)\par G .Lymph node, Infrapancreatic, excision: Metastatic adenocarcinoma involving one of one lymph node(1/1) \par \par 2/3/21 CT CAP At [facility]\par 1.Interval development of left hepatic vein thrombosis as well as a large geographic region of hypodensity throughout the left hepatic lobe measuring 8.4x7.2x7.8cm. Finding could represent a perfusional anomaly related to left hepatic vein occlusion. However, a large metastatic lesion with associated left hepatic vein thrombosis is not excluded. Additionally, there is a 1.3am hypodensity in hepatic seg 6/7, which is also new and raises concern for a small metastatic focus. \par 2.Stable postsurgical changes of total gastrectomy and esophagojejunostomy and jejunojejunostomy , jejunostomy tube in place. No evidence of recurrence. No bowel obstruction. \par 3. No pulmonary nodule, focal consolidation, pleural effusion, or pneumothorax. No thoracic lymphadenopathy. \par 4. No abdominopelvic lymphadenopathy. \par \par 2/24/21 MRI Abdomen\par 1.Gastric adenocarcinoma s/p total gastrectomy with postsurgical appearance. No suspicious nodularity/enhancement at the surgical sites to suggest local recurrence. \par 2. Since the prior to CT dated 11/3/20 there has been interval increased atrophy of the left lateral segment, with  lack of uptake notes on hepatobiliary phase imaging  in a geographic distribution, suggestive of post radiation treatment changes. There is associated diminished caliber of the left portal vein and occluded left hepatic vein. Findings are most likely related to post radiation treatment changes of the left lateral segment. Infiltrative metastasis is considered much less likely. Recommend a 3 months f/u\par 3.Interval development of a new discrete 2 cm segment 6/7 hepatic lesion since the prior to CT AP dated 11/3/20 suspicious for viable hepatic metastasis. Additional indeterminate 0.3cm segment 5 hepatic lesion for which hepatic metastasis also remains on the differential diagnosis. \par \par 3/23/21\par CT guided liver biopsy\par -Adenocarcinoma, moderately-poorly differentiated \par positive for CK7, negative for CK20, CDX-2 Hepar-1\par This pattern of staining supports the diagnosis, but is not specific in regards to site of origin. Given  patient's history of gastric cancer, this morphology and immunophenotype would be compatible with a metastasis from that site.  \par \par 5/27/21 CT AP at [facility]\par 2.1 cm x 1.9 cm right hepatic mass previously 1.3x0.9cm. 1.1cm hypodense lesion right hepatic lobe not seen previously. Previously seen 8.4cmx7.2cm ill defined hypodensity throughout the left hepatic lobe is no longer seen. Imp: right hepatic mass increased in size. New small right hepatic lesion suspicious metastasis\par \par 07/13/21 MR Abdomen w/wo IV Contrast ([facility])\par Progressive atrophy of the left lobe of the liver and metastatic lesion with hypoenhancing defect on hepatobiliary phase imaging measuring approximately 4.2 x 3.2 cm, decreased in size from February CT where it measured 8.4 x 7.2 cm and from May CT where it measured approximately 4.3 x 4.1 cm. Hypoenhancing segment 6/7 lesion measuring 2.1 cm on prior CT scan measures 1.4 cm on the current exam. Segment 6 lesion is not readily seen on current study. \par No new liver metastasis are identified. MRI Findings of hepatic iron deposition. Hemochromatosis protocol by be performed as needed. Asymmetric left hemidiaphragm elevation as above. \par \par 8/9/21 MRI Abdomen\par 1. Minimal interval decreased size left hepatic lobe hypoenhancing lesion now measuring 3.9cm, previously measuring 4.2cm\par 2. Interval decreased size right hepatic lobe segment 6/7 lesion measuring 1.0 cm previously measuring 1.4cm\par 3. No significant change right hepatic lobe segment 6 lesion ,measuring 0.7cm\par 4. No signs of hepatic iron deposition. \par \par 12/1/21 CT CHEST WITHOUT CONTRAST\par 1. No intrathoracic metastatic disease\par 2. For evaluation of contents of the upper abdomen, please refer to the MRI abdomen study scheduled to be performed on the same day \par \par 12/1/21 MRI ABDOMEN WITHOUT/WITH IV CONTRAST\par 1. Status post total gastrectomy and left lateral segmentectomy as well as post chemoradiation of liver metastases, currently on immunotherapy. Evolving post radiation treatment changes in the medial left hepatic lobe since the prior study dated 8/9/2021 (without discrete lesion noted on today's exam). Continued slight decrease in size of 2 right hepatic lobe metastases which measure up to 0.9 cm in segment 7 on the current study. No new liver metastases on the current study.\par 2. 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 Post radiation treatment changes to the bone marrow. \par \par ct DNA not detected (7/6/21, 10/12/21) [de-identified] : Patient presents to clinic for f/u. Continues to have consistent peripheral neuropathy.  Reports moderate fatigue. Patient states he has diarrhea/nausea occasionally. Denies any pain, SOB, chest pain, N/C, skin rash, or fever. \par \par

## 2022-02-18 NOTE — REVIEW OF SYSTEMS
[Fever] : no fever [Chills] : no chills [Night Sweats] : no night sweats [Fatigue] : fatigue [Recent Change In Weight] : ~T no recent weight change [Eye Pain] : no eye pain [Dysphagia] : no dysphagia [Odynophagia] : no odynophagia [Chest Pain] : no chest pain [Lower Ext Edema] : no lower extremity edema [Shortness Of Breath] : no shortness of breath [Wheezing] : no wheezing [Cough] : no cough [Abdominal Pain] : no abdominal pain [Vomiting] : no vomiting [Constipation] : no constipation [Diarrhea] : diarrhea [Dysuria] : no dysuria [Muscle Weakness] : no muscle weakness [Confused] : no confusion [Dizziness] : no dizziness [Fainting] : no fainting [Difficulty Walking] : no difficulty walking [Insomnia] : insomnia [Negative] : Allergic/Immunologic [FreeTextEntry7] : nausea at times [de-identified] : chronic tingling to fingers/toes

## 2022-02-23 ENCOUNTER — APPOINTMENT (OUTPATIENT)
Dept: HEMATOLOGY ONCOLOGY | Facility: CLINIC | Age: 67
End: 2022-02-23
Payer: MEDICAID

## 2022-03-07 ENCOUNTER — LABORATORY RESULT (OUTPATIENT)
Age: 67
End: 2022-03-07

## 2022-03-07 ENCOUNTER — APPOINTMENT (OUTPATIENT)
Dept: HEMATOLOGY ONCOLOGY | Facility: CLINIC | Age: 67
End: 2022-03-07
Payer: MEDICAID

## 2022-03-07 VITALS
SYSTOLIC BLOOD PRESSURE: 97 MMHG | DIASTOLIC BLOOD PRESSURE: 59 MMHG | RESPIRATION RATE: 18 BRPM | TEMPERATURE: 96.9 F | HEART RATE: 72 BPM | WEIGHT: 130 LBS | HEIGHT: 70 IN | OXYGEN SATURATION: 99 % | BODY MASS INDEX: 18.61 KG/M2

## 2022-03-07 DIAGNOSIS — D50.9 IRON DEFICIENCY ANEMIA, UNSPECIFIED: ICD-10-CM

## 2022-03-07 DIAGNOSIS — E46 UNSPECIFIED PROTEIN-CALORIE MALNUTRITION: ICD-10-CM

## 2022-03-07 DIAGNOSIS — I26.99 OTHER PULMONARY EMBOLISM W/OUT ACUTE COR PULMONALE: ICD-10-CM

## 2022-03-07 LAB
ALBUMIN SERPL ELPH-MCNC: 3.4 G/DL
ALP BLD-CCNC: 118 U/L
ALT SERPL-CCNC: 39 U/L
ANION GAP SERPL CALC-SCNC: 3 MMOL/L
AST SERPL-CCNC: 40 U/L
BILIRUB SERPL-MCNC: 0.7 MG/DL
BUN SERPL-MCNC: 12 MG/DL
CALCIUM SERPL-MCNC: 8.5 MG/DL
CHLORIDE SERPL-SCNC: 108 MMOL/L
CO2 SERPL-SCNC: 30 MMOL/L
CREAT SERPL-MCNC: 0.8 MG/DL
EGFR: 99 ML/MIN/1.73M2
GLUCOSE SERPL-MCNC: 80 MG/DL
POTASSIUM SERPL-SCNC: 4.1 MMOL/L
PROT SERPL-MCNC: 6.1 G/DL
SODIUM SERPL-SCNC: 141 MMOL/L

## 2022-03-07 PROCEDURE — 99214 OFFICE O/P EST MOD 30 MIN: CPT | Mod: 25

## 2022-03-07 RX ORDER — APIXABAN 2.5 MG/1
2.5 TABLET, FILM COATED ORAL
Qty: 60 | Refills: 0 | Status: DISCONTINUED | COMMUNITY
Start: 2021-05-19 | End: 2022-03-07

## 2022-03-07 RX ORDER — BACLOFEN 10 MG/1
10 TABLET ORAL TWICE DAILY
Qty: 10 | Refills: 0 | Status: ACTIVE | COMMUNITY
Start: 2022-03-03

## 2022-03-07 NOTE — HISTORY OF PRESENT ILLNESS
[de-identified] : Mr. Murry is 66yr M with Stage III(ypT4b,pN2,cMo AJCC 8th ed) poorly  differentiated gastric cancer of the body and antrum with MSI-H, Her2 -ve, KRAS WT, PD- L1 expression with CPS 5, s/p 8 cycles of FLOT (4/16/20-8/12/20), Total gastrectomy with esophagojejunostomy with enblock resection of segment 2/3, periportal and D2 lymphadenectomy, feeding jejunostomy, complete omentectomy,oversewing of duodenum (9/14/20), (Xeloda)RT(12/14/20). Now with recurrent dz,isolated biopsy proven liver met segment 6. Started C1 Pembrolizumab with Dr. Mendoza at [place] on 4/20/21. He went to the ED at [facility] for pain and leakage from PEG tube on 5/27 and repeated CT AP with mixed response and PEG tube was removed after that. S/P C13 Pembro on 1/7/22.  He gets Pembro at [facility] due to proximity to his home. Tolerating Tx well, symptomatically improved and CEA has been trending down. MR Abdomen (8/9/21) showing decreased in left and right lobe mets.Continued slight decrease in size of 2 right hepatic lobe metastases which measure up to 0.9 cm in segment 7 on the current study. No new liver metastases on MRI dated in December 2021. Now CEA slightly increasing. Patient here for f/u  \par \par He was diagnosed with  Stage III(ypT4b,pN2,cMo) poorly  differentiated gastric cancer of the body and antrum, Her2 negative. He completed 8 cycles of FLOT chemotherapy 4/16/20-8/12/20  then had total gastrectomy with esophagojejunostomy with enblock resection of segment 2/3, periportal and D2 lymphadenectomy, feeding jejunostomy, complete omentectomy,oversewing of duodenum on 9/14/20 and completed post-op chemo(Xeloda)RT 12/14/20 at [facility]. Surveillance CT AP on 2/3/21 showed interval development of left hepatic vein thrombosis as well as a large geographic region of hypodensity throughout the left hepatic lobe measuring 8.4x7.2x7.8 cm . He underwent liver biopsy on 3/23/21, path confirmed moderately differentiated adenocarcinoma, positive for CK7, negative for CK20, CDX-2 , Hepar-1.  His oncologist at [facility] is suggesting he has Immunotherapy. He has sought second opinion from [facility] and is now here for a third opinion. He is reluctant to offer in depth information regarding Phx.\par \par 9/14/20\par surgical pathology report\par A. Omentum, Omentectomy: Benign omental fibroadipose tissue with chronic inflammation and reactive mesothelium\par B Lymph node, common bile duct , excision: One benign lymph node(0/1)\par C. Lymph node, Hepatic artery , excision: Metastatic adenocarcinoma involving three of three lymph node(3/3)\par D. Lymph node, portal vein excision: One benign lymph node(0/1)\par E. Stomach and Liber, Total gastrectomy and partial hepatectomy \par - Invasive poorly differentiated adenocarcinoma, measuring 8.0cm, predominantly involving gastric body and antrum\par -Tumor invades through entire thickness of gastric wall and into liver parenchyma. \par -All surgical margins( proximal ,distal, radial and liver resection margins) are free of tumor.\par - Focal lymphovascular space invasion present \par - No peripheral invasion seen\par -Metastatic adenocarcinoma involving one of twenty five lymph nodes(1/25)\par -Metastatic focus in lymph node measures 2.2cm\par -Background stomach shows intestinal metaplasia\par -AJCC 8th ed pathologic tumor stage: [id number]\par F.Lymph node, left gastric , excision:  One benign lymph node(0/1)\par G .Lymph node, Infrapancreatic, excision: Metastatic adenocarcinoma involving one of one lymph node(1/1) \par \par 2/3/21 CT CAP At [facility]\par 1.Interval development of left hepatic vein thrombosis as well as a large geographic region of hypodensity throughout the left hepatic lobe measuring 8.4x7.2x7.8cm. Finding could represent a perfusional anomaly related to left hepatic vein occlusion. However, a large metastatic lesion with associated left hepatic vein thrombosis is not excluded. Additionally, there is a 1.3am hypodensity in hepatic seg 6/7, which is also new and raises concern for a small metastatic focus. \par 2.Stable postsurgical changes of total gastrectomy and esophagojejunostomy and jejunojejunostomy , jejunostomy tube in place. No evidence of recurrence. No bowel obstruction. \par 3. No pulmonary nodule, focal consolidation, pleural effusion, or pneumothorax. No thoracic lymphadenopathy. \par 4. No abdominopelvic lymphadenopathy. \par \par 2/24/21 MRI Abdomen\par 1.Gastric adenocarcinoma s/p total gastrectomy with postsurgical appearance. No suspicious nodularity/enhancement at the surgical sites to suggest local recurrence. \par 2. Since the prior to CT dated 11/3/20 there has been interval increased atrophy of the left lateral segment, with  lack of uptake notes on hepatobiliary phase imaging  in a geographic distribution, suggestive of post radiation treatment changes. There is associated diminished caliber of the left portal vein and occluded left hepatic vein. Findings are most likely related to post radiation treatment changes of the left lateral segment. Infiltrative metastasis is considered much less likely. Recommend a 3 months f/u\par 3.Interval development of a new discrete 2 cm segment 6/7 hepatic lesion since the prior to CT AP dated 11/3/20 suspicious for viable hepatic metastasis. Additional indeterminate 0.3cm segment 5 hepatic lesion for which hepatic metastasis also remains on the differential diagnosis. \par \par 3/23/21\par CT guided liver biopsy\par -Adenocarcinoma, moderately-poorly differentiated \par positive for CK7, negative for CK20, CDX-2 Hepar-1\par This pattern of staining supports the diagnosis, but is not specific in regards to site of origin. Given  patient's history of gastric cancer, this morphology and immunophenotype would be compatible with a metastasis from that site.  \par \par 5/27/21 CT AP at [facility]\par 2.1 cm x 1.9 cm right hepatic mass previously 1.3x0.9cm. 1.1cm hypodense lesion right hepatic lobe not seen previously. Previously seen 8.4cmx7.2cm ill defined hypodensity throughout the left hepatic lobe is no longer seen. Imp: right hepatic mass increased in size. New small right hepatic lesion suspicious metastasis\par \par 07/13/21 MR Abdomen w/wo IV Contrast ([facility])\par Progressive atrophy of the left lobe of the liver and metastatic lesion with hypoenhancing defect on hepatobiliary phase imaging measuring approximately 4.2 x 3.2 cm, decreased in size from February CT where it measured 8.4 x 7.2 cm and from May CT where it measured approximately 4.3 x 4.1 cm. Hypoenhancing segment 6/7 lesion measuring 2.1 cm on prior CT scan measures 1.4 cm on the current exam. Segment 6 lesion is not readily seen on current study. \par No new liver metastasis are identified. MRI Findings of hepatic iron deposition. Hemochromatosis protocol by be performed as needed. Asymmetric left hemidiaphragm elevation as above. \par \par 8/9/21 MRI Abdomen\par 1. Minimal interval decreased size left hepatic lobe hypoenhancing lesion now measuring 3.9cm, previously measuring 4.2cm\par 2. Interval decreased size right hepatic lobe segment 6/7 lesion measuring 1.0 cm previously measuring 1.4cm\par 3. No significant change right hepatic lobe segment 6 lesion ,measuring 0.7cm\par 4. No signs of hepatic iron deposition. \par \par 12/1/21 CT CHEST WITHOUT CONTRAST\par 1. No intrathoracic metastatic disease\par 2. For evaluation of contents of the upper abdomen, please refer to the MRI abdomen study scheduled to be performed on the same day \par \par 12/1/21 MRI ABDOMEN WITHOUT/WITH IV CONTRAST\par 1. Status post total gastrectomy and left lateral segmentectomy as well as post chemoradiation of liver metastases, currently on immunotherapy. Evolving post radiation treatment changes in the medial left hepatic lobe since the prior study dated 8/9/2021 (without discrete lesion noted on today's exam). Continued slight decrease in size of 2 right hepatic lobe metastases which measure up to 0.9 cm in segment 7 on the current study. No new liver metastases on the current study.\par 2. 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 Post radiation treatment changes to the bone marrow. \par \par 3/1/22 CT CHEST at [facility]\par No metastatic disease\par \par ct DNA not detected (7/6/21, 10/12/21) [de-identified] : Patient presents to clinic for f/u. On Pembro every 3 weeks. Continues to have consistent peripheral neuropathy, now on pregabalin 75mg bid. Appetite is fine, gained 2 lbs since last visit. Denies any pain, SOB, chest pain, N/V/C/D, skin rash, or fever. \par \par

## 2022-03-07 NOTE — ASSESSMENT
[FreeTextEntry1] : Mr. Murry is 66yr M with Stage III(ypT4b,pN2,cMo AJCC 8th ed) poorly  differentiated gastric cancer of the body and antrum with MSI-H, Her2 -ve, KRAS WT, PD- L1 expression with CPS 5, s/p 8 cycles of FLOT (4/16/20-8/12/20), Total gastrectomy with esophagojejunostomy with enblock resection of segment 2/3, periportal and D2 lymphadenectomy, feeding jejunostomy, complete omentectomy,oversewing of duodenum (9/14/20), (Xeloda)RT(12/14/20). Now with recurrent dz,isolated biopsy proven liver met segment 6. Started C1 Pembrolizumab with Dr. Mendoza at [place] on 4/20/21. He went to the ED at [facility] for pain and leakage from PEG tube on 5/27 and repeated CT AP with mixed response and PEG tube was removed after that. S/P C13 Pembro on 1/7/22. He gets Pembro at [facility] due to proximity to his home. Tolerating Tx well, symptomatically improved and CEA has been trending down. MR Abdomen (8/9/21) showing decreased in left and right lobe mets. Continued slight decrease in size of 2 right hepatic lobe metastases which measure up to 0.9 cm in segment 7 on the current study. No new liver metastases on MRI in December 2021. Now CEA slightly increasing. Patient here for f/u after MRI and T chest on 3/1/22 at [facility], pending MRI result. \par \par 9/14/20\par surgical pathology report\par A. Omentum, Omentectomy: Benign omental fibroadipose tissue with chronic inflammation and reactive mesothelium\par B Lymph node, common bile duct , excision: One benign lymph node(0/1)\par C. Lymph node, Hepatic artery , excision: Metastatic adenocarcinoma involving three of three lymph node(3/3)\par D. Lymph node, portal vein excision: One benign lymph node(0/1)\par E. Stomach and Liber, Total gastrectomy and partial hepatectomy \par - Invasive poorly differentiated adenocarcinoma, measuring 8.0cm, predominantly involving gastric body and antrum\par -Tumor invades through entire thickness of gastric wall and into liver parenchyma. \par -All surgical margins( proximal ,distal, radial and liver resection margins) are free of tumor.\par - Focal lymphovascular space invasion present \par - No peripheral invasion seen\par -Metastatic adenocarcinoma involving one of twenty five lymph nodes(1/25)\par -Metastatic focus in lymph node measures 2.2cm\par -Background stomach shows intestinal metaplasia\par -AJCC 8th ed pathologic tumor stage: [id number]\par F.Lymph node, left gastric , excision:  One benign lymph node(0/1)\par G .Lymph node, Infrapancreatic, excision: Metastatic adenocarcinoma involving one of one lymph node(1/1) \par \par 2/3/21 CT CAP At [facility]\par 1.Interval development of left hepatic vein thrombosis as well as a large geographic region of hypodensity throughout the left hepatic lobe measuring 8.4x7.2x7.8cm. Finding could represent a perfusional anomaly related to left hepatic vein occlusion. However, a large metastatic lesion with associated left hepatic vein thrombosis is not excluded. Additionally, there is a 1.3am hypodensity in hepatic seg 6/7, which is also new and raises concern for a small metastatic focus. \par 2.Stable postsurgical changes of total gastrectomy and esophagojejunostomy and jejunojejunostomy , jejunostomy tube in place. No evidence of recurrence. No bowel obstruction. \par 3. No pulmonary nodule, focal consolidation, pleural effusion, or pneumothorax. No thoracic lymphadenopathy. \par 4. No abdominopelvic lymphadenopathy. \par \par 2/24/21 MRI Abdomen\par 1.Gastric adenocarcinoma s/p total gastrectomy with postsurgical appearance. No suspicious nodularity/enhancement at the surgical sites to suggest local recurrence. \par 2. Since the prior to CT dated 11/3/20 there has been interval increased atrophy of the left lateral segment, with  lack of uptake notes on hepatobiliary phase imaging  in a geographic distribution, suggestive of post radiation treatment changes. There is associated diminished caliber of the left portal vein and occluded left hepatic vein. Findings are most likely related to post radiation treatment changes of the left lateral segment. Infiltrative metastasis is considered much less likely. Recommend a 3 months f/u\par 3.Interval development of a new discrete 2 cm segment 6/7 hepatic lesion since the prior to CT AP dated 11/3/20 suspicious for viable hepatic metastasis. Additional indeterminate 0.3cm segment 5 hepatic lesion for which hepatic metastasis also remains on the differential diagnosis. \par \par 3/23/21\par CT guided liver biopsy\par -Adenocarcinoma, moderately-poorly differentiated \par positive for CK7, negative for CK20, CDX-2 Hepar-1\par This pattern of staining supports the diagnosis, but is not specific in regards to site of origin. Given  patient's history of gastric cancer, this morphology and immunophenotype would be compatible with a metastasis from that site.  \par \par 5/27/21 CT AP at [facility]\par 2.1 cm x 1.9 cm right hepatic mass previously 1.3x0.9cm. 1.1cm hypodense lesion right hepatic lobe not seen previously. Previously seen 8.4cmx7.2cm ill defined hypodensity throughout the left hepatic lobe is no longer seen. Imp: right hepatic mass increased in size. New small right hepatic lesion suspicious metastasis.\par \par 07/13/21 MR Abdomen w/wo IV Contrast ([facility])\par Progressive atrophy of the left lobe of the liver and metastatic lesion with hypoenhancing defect on hepatobiliary phase imaging measuring approximately 4.2 x 3.2 cm, decreased in size from February CT where it measured 8.4 x 7.2 cm and from May CT where it measured approximately 4.3 x 4.1 cm. Hypoenhancing segment 6/7 lesion measuring 2.1 cm on prior CT scan measures 1.4 cm on the current exam. Segment 6 lesion is not readily seen on current study. \par No new liver metastasis are identified. MRI Findings of hepatic iron deposition. Hemochromatosis protocol by be performed as needed. Asymmetric left hemidiaphragm elevation as above. \par \par 8/9/21 MRI Abdomen\par 1. Minimal interval decreased size left hepatic lobe hypoenhancing lesion now measuring 3.9cm, previously measuring 4.2cm\par 2. Interval decreased size right hepatic lobe segment 6/7 lesion measuring 1.0 cm previously measuring 1.4cm\par 3. No significant change right hepatic lobe segment 6 lesion ,measuring 0.7cm\par 4. No signs of hepatic iron deposition. \par \par 12/1/21 CT CHEST WITHOUT CONTRAST\par 1. No intrathoracic metastatic disease\par 2. For evaluation of contents of the upper abdomen, please refer to the MRI abdomen study scheduled to be performed on the same day \par \par 12/1/21 MRI ABDOMEN WITHOUT/WITH IV CONTRAST\par 1. Status post total gastrectomy and left lateral segmentectomy as well as post chemoradiation of liver metastases, currently on immunotherapy. Evolving post radiation treatment changes in the medial left hepatic lobe since the prior study dated 8/9/2021 (without discrete lesion noted on today's exam). Continued slight decrease in size of 2 right hepatic lobe metastases which measure up to 0.9 cm in segment 7 on the current study. No new liver metastases on the current study.\par 2. 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 Post radiation treatment changes to the bone marrow. \par \par 3/1/22 CT CHEST at [facility]\par No metastatic disease\par \par ct DNA not detected (7/6/21, 10/12/21)\par \par Plan\par # Stage III(ypT4b,pN2,cMo AJCC 8th ed) MSI-H, Her2 -ve, KRAS-ve, PD- L1 expression CPS 5  Gastric cancer, now with recurrent dz with liver mets\par -s/p C8 FLOT, Total gastrectomy with esophagojejunostomy, Xeloda/RT completed 12/20. Noted to have local and hep recurrence\par -Started C1 Pembrolizumab with Dr. Mendoza at [place] on 4/20/21. CT AP on 5/27 with mixed response. MR Abdomen/Pelvis on 8/9/21 showing decrease in size of liver mets.  MRI on 12/1/21 showing continued slightly decrease in size of liver w/o new disease. Status post total gastrectomy and left lateral segmentectomy as well as post chemoradiation of liver metastases, currently on immunotherapy. Evolving post radiation treatment changes in the medial left hepatic lobe since the prior study dated 8/9/2021 (without discrete lesion noted on today's exam). Continued slight decrease in size of 2 right hepatic lobe metastases which measure up to 0.9 cm in segment 7 on the current study. No new liver metastases on the current study.\par - Received C15 Pembro on 2/18/22 ct Pembro  C16 will be on 3/11/22. \par - Saw surgeon at [facility] and not candidate for surgery  at this time.would continue treatment for 2- 3 months and reevaluate. He is scheduled to see a surgeon on 3/9/22. \par - SW f/u\par -CEA  slightly increasing 7.1 (1/27/22), 6.5 (12/15/21), 4.8(11/10/21),5.7(10/12/21), 6.1(9/14/21),  7 ( 8/25/21),  42.3 (06/14/21) and 253 (05/05/21). \par -ct DNA not detected (7/6/21, 10/12/21) WIll send today.\par -Check CBC, CMP, CEA, Iron/TIBC, Ferritin, B12, Vit D,and  TSH\par -Repeated CT Chest and MRI Abdomen on 3/1/22 at [facility]. CT Chest negative, MRI result is not available today. He will bring it at next visit. \par -Will repeat PET/CT in 3 months \par \par # Peripheral neuropathy\par - Continue Lyrica 75mg bid  as per Dr. Tavarez. \par \par RTC on 3/30/22

## 2022-03-07 NOTE — REVIEW OF SYSTEMS
[Fatigue] : fatigue [Fever] : no fever [Chills] : no chills [Night Sweats] : no night sweats [Recent Change In Weight] : ~T recent weight change [Eye Pain] : no eye pain [Dysphagia] : no dysphagia [Odynophagia] : no odynophagia [Chest Pain] : no chest pain [Lower Ext Edema] : no lower extremity edema [Shortness Of Breath] : no shortness of breath [Wheezing] : no wheezing [Cough] : no cough [Abdominal Pain] : no abdominal pain [Vomiting] : no vomiting [Constipation] : no constipation [Diarrhea] : no diarrhea [Dysuria] : no dysuria [Muscle Weakness] : no muscle weakness [Confused] : no confusion [Dizziness] : no dizziness [Fainting] : no fainting [Difficulty Walking] : no difficulty walking [Insomnia] : no insomnia [Negative] : Gastrointestinal [de-identified] : chronic tingling to fingers/toes

## 2022-03-08 LAB — CEA SERPL-MCNC: 5.1 NG/ML

## 2022-03-10 ENCOUNTER — APPOINTMENT (OUTPATIENT)
Dept: ORTHOPEDIC SURGERY | Facility: CLINIC | Age: 67
End: 2022-03-10
Payer: MEDICAID

## 2022-03-10 DIAGNOSIS — G89.29 DORSALGIA, UNSPECIFIED: ICD-10-CM

## 2022-03-10 DIAGNOSIS — M54.9 DORSALGIA, UNSPECIFIED: ICD-10-CM

## 2022-03-10 DIAGNOSIS — M54.2 CERVICALGIA: ICD-10-CM

## 2022-03-10 DIAGNOSIS — Z85.028 PERSONAL HISTORY OF OTHER MALIGNANT NEOPLASM OF STOMACH: ICD-10-CM

## 2022-03-10 DIAGNOSIS — G89.29 CERVICALGIA: ICD-10-CM

## 2022-03-10 LAB — 25(OH)D3 SERPL-MCNC: 36.1 NG/ML

## 2022-03-10 PROCEDURE — 99204 OFFICE O/P NEW MOD 45 MIN: CPT

## 2022-03-10 PROCEDURE — 72110 X-RAY EXAM L-2 SPINE 4/>VWS: CPT

## 2022-03-10 PROCEDURE — 72050 X-RAY EXAM NECK SPINE 4/5VWS: CPT

## 2022-03-14 PROBLEM — M54.9 CHRONIC BACK PAIN: Status: ACTIVE | Noted: 2022-03-14

## 2022-03-14 PROBLEM — Z85.028 HISTORY OF MALIGNANT NEOPLASM OF STOMACH: Status: RESOLVED | Noted: 2022-03-14 | Resolved: 2022-03-14

## 2022-03-14 PROBLEM — M54.2 CHRONIC NECK PAIN: Status: ACTIVE | Noted: 2022-03-14

## 2022-03-14 NOTE — HISTORY OF PRESENT ILLNESS
[de-identified] : 66 year old male presents with acute exacerbation of chronic neck and back pain.  He also complains of numbness in his hands and feet.  He reports the numbness started after he had chemotherapy for gastric cancer.  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  He denies radicular pain, recent illness, fevers, weakness, balance problems, saddle anesthesia, urinary retention or fecal incontinence. He takes pregabalin with some relief.

## 2022-03-14 NOTE — ASSESSMENT
[FreeTextEntry1] : 66 year old male presents with acute exacerbation of chronic neck and back pain.  He also complains of numbness in his hands and feet.  He reports the numbness started after he had chemotherapy for gastric cancer.  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  He is otherwise neurologically intact.    He will continue pregabalin.  He was given a prescription for baclofen.  He was given a referral for PT.  He will followup in 2 months. We discussed red flag symptoms that would require emergent evaluation. He knows to call with any questions or concerns or if his symptoms acutely worsen.

## 2022-03-14 NOTE — PHYSICAL EXAM
[de-identified] : General: No acute distress, conversant, well-nourished.\par Head: Normocephalic, atraumatic\par Neck: trachea midline, FROM\par Heart: normotensive and normal rate and rhythm\par Lungs: No labored breathing\par Skin: No abrasions, no rashes, no edema\par Psych: Alert and oriented to person, place and time\par Extremities: no peripheral edema or digital cyanosis\par Gait: Normal gait. Can perform tandem gait.  \par Vascular: warm and well perfused distally, palpable distal pulses\par \par [facility]:\par Spine: \par No tenderness to palpation.  No step-off, no deformity.\par \par NEURO:\par Sensation \par          Left           \par C5     2/2               \par C6     2/2               \par C7     2/2               \par C8     2/2              \par T1     2/2             \par \par          Right         \par C5     2/2               \par C6     1/2               \par C7     1/2               \par C8     1/2              \par T1     2/2      \par \par Motor: \par                                                Left             \par C5 (deltoid abduction)             5/5               \par C6 (biceps flexion)                   5/5                \par C7 (triceps extension)             5/5               \par C8 (finger flexion)                     5/5               \par T1 (interosseous)                     5/5           \par \par                                                Right           \par C5 (deltoid abduction)             5/5               \par C6 (biceps flexion)                   5/5                \par C7 (triceps extension)             5/5               \par C8 (finger flexion)                     5/5               \par T1 (interosseous)                     5/5                     \par \par Sensation \par Left L2  -  2/2            \par Left L3  -  2/2\par Left L4  -  1/2\par Left L5  -  1/2\par Left S1  -  1/2\par \par Right L2  -  2/2            \par Right L3  -  2/2\par Right L4  -  2/2\par Right L5  -  2/2\par Right S1  -  2/2\par \par Motor: \par Left L2 (hip flexion)                            5/5                \par Left L3 (knee extension)                   5/5                \par Left L4 (ankle dorsiflexion)                 5/5                \par Left L5 (long toe extensor)                5/5                \par Left S1 (ankle plantar flexion)           5/5\par \par Right L2 (hip flexion)                            5/5                \par Right L3 (knee extension)                   5/5                \par Right L4 (ankle dorsiflexion)                 5/5                \par Right L5 (long toe extensor)                5/5                \par Right S1 (ankle plantar flexion)           5/5\par \par Reflexes: Normal and symmetric\par Negative Spurling’s test.  Negative Hills’s reflex.  \par Negative clonus.  Down-going Babinski. [de-identified] : I ordered radiographs to evaluate the patient's symptoms.\par \par Cervical 4 view radiographs taken in the office today show no dislocation or fracture. Cervical spondylosis.  No instability on dynamic series.\par \par Lumbar 4 view radiographs taken in the office today show no dislocation or fracture.  Lumbar spondylosis.  No instability on dynamic series.

## 2022-03-30 ENCOUNTER — APPOINTMENT (OUTPATIENT)
Dept: HEMATOLOGY ONCOLOGY | Facility: CLINIC | Age: 67
End: 2022-03-30
Payer: MEDICAID

## 2022-03-30 VITALS
BODY MASS INDEX: 18.61 KG/M2 | DIASTOLIC BLOOD PRESSURE: 63 MMHG | RESPIRATION RATE: 18 BRPM | OXYGEN SATURATION: 99 % | TEMPERATURE: 97.3 F | HEART RATE: 73 BPM | WEIGHT: 130 LBS | SYSTOLIC BLOOD PRESSURE: 100 MMHG | HEIGHT: 70 IN

## 2022-03-30 LAB
ALBUMIN SERPL ELPH-MCNC: 3.5 G/DL
ALP BLD-CCNC: 121 U/L
ALT SERPL-CCNC: 31 U/L
ANION GAP SERPL CALC-SCNC: -2 MMOL/L
AST SERPL-CCNC: 45 U/L
BASOPHILS # BLD AUTO: 0.07 K/UL
BASOPHILS NFR BLD AUTO: 2 %
BILIRUB SERPL-MCNC: 0.7 MG/DL
BUN SERPL-MCNC: 17 MG/DL
CALCIUM SERPL-MCNC: 9.2 MG/DL
CHLORIDE SERPL-SCNC: 109 MMOL/L
CO2 SERPL-SCNC: 33 MMOL/L
CREAT SERPL-MCNC: 0.8 MG/DL
EGFR: 99 ML/MIN/1.73M2
EOSINOPHIL # BLD AUTO: 0.28 K/UL
EOSINOPHIL NFR BLD AUTO: 8.1 %
FERRITIN SERPL-MCNC: 274 NG/ML
GLUCOSE SERPL-MCNC: 81 MG/DL
HCT VFR BLD CALC: 34.3 %
HGB BLD-MCNC: 10.9 G/DL
IMM GRANULOCYTES NFR BLD AUTO: 0.3 %
IRON SATN MFR SERPL: 24 %
IRON SERPL-MCNC: 67 UG/DL
LYMPHOCYTES # BLD AUTO: 0.76 K/UL
LYMPHOCYTES NFR BLD AUTO: 22 %
MAN DIFF?: NORMAL
MCHC RBC-ENTMCNC: 30.3 PG
MCHC RBC-ENTMCNC: 31.8 GM/DL
MCV RBC AUTO: 95.3 FL
MONOCYTES # BLD AUTO: 0.49 K/UL
MONOCYTES NFR BLD AUTO: 14.2 %
NEUTROPHILS # BLD AUTO: 1.85 K/UL
NEUTROPHILS NFR BLD AUTO: 53.4 %
PLATELET # BLD AUTO: 178 K/UL
POTASSIUM SERPL-SCNC: 4.5 MMOL/L
PROT SERPL-MCNC: 6.3 G/DL
RBC # BLD: 3.6 M/UL
RBC # FLD: 14.6 %
SODIUM SERPL-SCNC: 140 MMOL/L
TIBC SERPL-MCNC: 274 UG/DL
UIBC SERPL-MCNC: 207 UG/DL
WBC # FLD AUTO: 3.46 K/UL

## 2022-03-30 PROCEDURE — 99214 OFFICE O/P EST MOD 30 MIN: CPT

## 2022-03-30 NOTE — REVIEW OF SYSTEMS
[Fatigue] : fatigue [Recent Change In Weight] : ~T recent weight change [Negative] : Allergic/Immunologic [Fever] : no fever [Chills] : no chills [Night Sweats] : no night sweats [Eye Pain] : no eye pain [Dysphagia] : no dysphagia [Odynophagia] : no odynophagia [Chest Pain] : no chest pain [Lower Ext Edema] : no lower extremity edema [Shortness Of Breath] : no shortness of breath [Wheezing] : no wheezing [Cough] : no cough [Abdominal Pain] : no abdominal pain [Vomiting] : no vomiting [Constipation] : no constipation [Diarrhea] : no diarrhea [Dysuria] : no dysuria [Muscle Weakness] : no muscle weakness [Confused] : no confusion [Dizziness] : no dizziness [Fainting] : no fainting [Difficulty Walking] : no difficulty walking [Insomnia] : no insomnia [de-identified] : chronic tingling to fingers/toes

## 2022-03-30 NOTE — HISTORY OF PRESENT ILLNESS
[de-identified] : Mr. Murry is 66yr M with Stage III(ypT4b,pN2,cMo AJCC 8th ed) poorly  differentiated gastric cancer of the body and antrum with MSI-H, Her2 -ve, KRAS WT, PD- L1 expression with CPS 5, s/p 8 cycles of FLOT (4/16/20-8/12/20), Total gastrectomy with esophagojejunostomy with enblock resection of segment 2/3, periportal and D2 lymphadenectomy, feeding jejunostomy, complete omentectomy,oversewing of duodenum (9/14/20), (Xeloda)RT(12/14/20). Now with recurrent dz,isolated biopsy proven liver met segment 6. Started C1 Pembrolizumab with Dr. Mendoza at [place] on 4/20/21. He went to the ED at [facility] for pain and leakage from PEG tube on 5/27 and repeated CT AP with mixed response and PEG tube was removed after that. S/P C13 Pembro on 1/7/22.  He gets Pembro at [facility] due to proximity to his home. Tolerating Tx well, symptomatically improved and CEA has been trending down. MR Abdomen (8/9/21) showing decreased in left and right lobe mets.Continued slight decrease in size of 2 right hepatic lobe metastases which measure up to 0.9 cm in segment 7 on the current study. No new liver metastases on MRI dated in December 2021. Now CEA slightly increasing. Patient here for f/u  \par \par He was diagnosed with  Stage III(ypT4b,pN2,cMo) poorly  differentiated gastric cancer of the body and antrum, Her2 negative. He completed 8 cycles of FLOT chemotherapy 4/16/20-8/12/20  then had total gastrectomy with esophagojejunostomy with enblock resection of segment 2/3, periportal and D2 lymphadenectomy, feeding jejunostomy, complete omentectomy,oversewing of duodenum on 9/14/20 and completed post-op chemo(Xeloda)RT 12/14/20 at [facility]. Surveillance CT AP on 2/3/21 showed interval development of left hepatic vein thrombosis as well as a large geographic region of hypodensity throughout the left hepatic lobe measuring 8.4x7.2x7.8 cm . He underwent liver biopsy on 3/23/21, path confirmed moderately differentiated adenocarcinoma, positive for CK7, negative for CK20, CDX-2 , Hepar-1.  His oncologist at [facility] is suggesting he has Immunotherapy. He has sought second opinion from [facility] and is now here for a third opinion. He is reluctant to offer in depth information regarding Phx.\par \par 9/14/20\par surgical pathology report\par A. Omentum, Omentectomy: Benign omental fibroadipose tissue with chronic inflammation and reactive mesothelium\par B Lymph node, common bile duct , excision: One benign lymph node(0/1)\par C. Lymph node, Hepatic artery , excision: Metastatic adenocarcinoma involving three of three lymph node(3/3)\par D. Lymph node, portal vein excision: One benign lymph node(0/1)\par E. Stomach and Liber, Total gastrectomy and partial hepatectomy \par - Invasive poorly differentiated adenocarcinoma, measuring 8.0cm, predominantly involving gastric body and antrum\par -Tumor invades through entire thickness of gastric wall and into liver parenchyma. \par -All surgical margins( proximal ,distal, radial and liver resection margins) are free of tumor.\par - Focal lymphovascular space invasion present \par - No peripheral invasion seen\par -Metastatic adenocarcinoma involving one of twenty five lymph nodes(1/25)\par -Metastatic focus in lymph node measures 2.2cm\par -Background stomach shows intestinal metaplasia\par -AJCC 8th ed pathologic tumor stage: [id number]\par F.Lymph node, left gastric , excision:  One benign lymph node(0/1)\par G .Lymph node, Infrapancreatic, excision: Metastatic adenocarcinoma involving one of one lymph node(1/1) \par \par 2/3/21 CT CAP At [facility]\par 1.Interval development of left hepatic vein thrombosis as well as a large geographic region of hypodensity throughout the left hepatic lobe measuring 8.4x7.2x7.8cm. Finding could represent a perfusional anomaly related to left hepatic vein occlusion. However, a large metastatic lesion with associated left hepatic vein thrombosis is not excluded. Additionally, there is a 1.3am hypodensity in hepatic seg 6/7, which is also new and raises concern for a small metastatic focus. \par 2.Stable postsurgical changes of total gastrectomy and esophagojejunostomy and jejunojejunostomy , jejunostomy tube in place. No evidence of recurrence. No bowel obstruction. \par 3. No pulmonary nodule, focal consolidation, pleural effusion, or pneumothorax. No thoracic lymphadenopathy. \par 4. No abdominopelvic lymphadenopathy. \par \par 2/24/21 MRI Abdomen\par 1.Gastric adenocarcinoma s/p total gastrectomy with postsurgical appearance. No suspicious nodularity/enhancement at the surgical sites to suggest local recurrence. \par 2. Since the prior to CT dated 11/3/20 there has been interval increased atrophy of the left lateral segment, with  lack of uptake notes on hepatobiliary phase imaging  in a geographic distribution, suggestive of post radiation treatment changes. There is associated diminished caliber of the left portal vein and occluded left hepatic vein. Findings are most likely related to post radiation treatment changes of the left lateral segment. Infiltrative metastasis is considered much less likely. Recommend a 3 months f/u\par 3.Interval development of a new discrete 2 cm segment 6/7 hepatic lesion since the prior to CT AP dated 11/3/20 suspicious for viable hepatic metastasis. Additional indeterminate 0.3cm segment 5 hepatic lesion for which hepatic metastasis also remains on the differential diagnosis. \par \par 3/23/21\par CT guided liver biopsy\par -Adenocarcinoma, moderately-poorly differentiated \par positive for CK7, negative for CK20, CDX-2 Hepar-1\par This pattern of staining supports the diagnosis, but is not specific in regards to site of origin. Given  patient's history of gastric cancer, this morphology and immunophenotype would be compatible with a metastasis from that site.  \par \par 5/27/21 CT AP at [facility]\par 2.1 cm x 1.9 cm right hepatic mass previously 1.3x0.9cm. 1.1cm hypodense lesion right hepatic lobe not seen previously. Previously seen 8.4cmx7.2cm ill defined hypodensity throughout the left hepatic lobe is no longer seen. Imp: right hepatic mass increased in size. New small right hepatic lesion suspicious metastasis\par \par 07/13/21 MR Abdomen w/wo IV Contrast ([facility])\par Progressive atrophy of the left lobe of the liver and metastatic lesion with hypoenhancing defect on hepatobiliary phase imaging measuring approximately 4.2 x 3.2 cm, decreased in size from February CT where it measured 8.4 x 7.2 cm and from May CT where it measured approximately 4.3 x 4.1 cm. Hypoenhancing segment 6/7 lesion measuring 2.1 cm on prior CT scan measures 1.4 cm on the current exam. Segment 6 lesion is not readily seen on current study. \par No new liver metastasis are identified. MRI Findings of hepatic iron deposition. Hemochromatosis protocol by be performed as needed. Asymmetric left hemidiaphragm elevation as above. \par \par 8/9/21 MRI Abdomen\par 1. Minimal interval decreased size left hepatic lobe hypoenhancing lesion now measuring 3.9cm, previously measuring 4.2cm\par 2. Interval decreased size right hepatic lobe segment 6/7 lesion measuring 1.0 cm previously measuring 1.4cm\par 3. No significant change right hepatic lobe segment 6 lesion ,measuring 0.7cm\par 4. No signs of hepatic iron deposition. \par \par 12/1/21 CT CHEST WITHOUT CONTRAST\par 1. No intrathoracic metastatic disease\par 2. For evaluation of contents of the upper abdomen, please refer to the MRI abdomen study scheduled to be performed on the same day \par \par 12/1/21 MRI ABDOMEN WITHOUT/WITH IV CONTRAST\par 1. Status post total gastrectomy and left lateral segmentectomy as well as post chemoradiation of liver metastases, currently on immunotherapy. Evolving post radiation treatment changes in the medial left hepatic lobe since the prior study dated 8/9/2021 (without discrete lesion noted on today's exam). Continued slight decrease in size of 2 right hepatic lobe metastases which measure up to 0.9 cm in segment 7 on the current study. No new liver metastases on the current study.\par 2. 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 Post radiation treatment changes to the bone marrow. \par \par 3/1/22 CT CHEST at [facility]\par No metastatic disease\par \par 03/10/22 MR Abdomen w/wo Contrast:\par Right hepatic lobe segment 7 hypoenhancing lesion now measures 0.9 cm. Right hepatic lobe segment 6 measuring 0.4 cm, previously 0.6 cm .Stable redemonstration of post treatment changes of the liver. Stable redemonstration of segment 7 hypoenhancing lesion. Slight interval decrease of size of segment 6 non enhancing lesion. Continued surveillance is advised. Non specific low signal in the left lower lobe, likely post radiation changes. \par \par ct DNA not detected (7/6/21, 10/12/21, 3/7/22)\par \par  [de-identified] : Patient presents to clinic for f/u. On Pembro every 3 weeks. Continues to have consistent peripheral neuropathy, now on pregabalin 75mg bid. Appetite is fine, gained 2 lbs since last visit. Denies any pain, SOB, chest pain, N/V/C/D, skin rash, or fever. \par \par

## 2022-03-30 NOTE — ASSESSMENT
[FreeTextEntry1] : Mr. Murry is 66yr M with Stage III(ypT4b,pN2,cMo AJCC 8th ed) poorly  differentiated gastric cancer of the body and antrum with MSI-H, Her2 -ve, KRAS WT, PD- L1 expression with CPS 5, s/p 8 cycles of FLOT (4/16/20-8/12/20), Total gastrectomy with esophagojejunostomy with enblock resection of segment 2/3, periportal and D2 lymphadenectomy, feeding jejunostomy, complete omentectomy,oversewing of duodenum (9/14/20), (Xeloda)RT(12/14/20). Now with recurrent dz,isolated biopsy proven liver met segment 6. Started C1 Pembrolizumab with Dr. Mendoza at [place] on 4/20/21. He went to the ED at [facility] for pain and leakage from PEG tube on 5/27 and repeated CT AP with mixed response and PEG tube was removed after that. S/P C13 Pembro on 1/7/22. He gets Pembro at [facility] due to proximity to his home. Tolerating Tx well, symptomatically improved and CEA has been trending down. MR Abdomen (8/9/21) showing decreased in left and right lobe mets. Continued slight decrease in size of 2 right hepatic lobe metastases which measure up to 0.9 cm in segment 7 on the current study. No new liver metastases on MRI in December 2021. Now CEA slightly increasing. Patient here for f/u after MRI and T chest on 3/1/22 at [facility]. Here to discuss further mgmt.\par \par 9/14/20\par surgical pathology report\par A. Omentum, Omentectomy: Benign omental fibroadipose tissue with chronic inflammation and reactive mesothelium\par B Lymph node, common bile duct , excision: One benign lymph node(0/1)\par C. Lymph node, Hepatic artery , excision: Metastatic adenocarcinoma involving three of three lymph node(3/3)\par D. Lymph node, portal vein excision: One benign lymph node(0/1)\par E. Stomach and Liber, Total gastrectomy and partial hepatectomy \par - Invasive poorly differentiated adenocarcinoma, measuring 8.0cm, predominantly involving gastric body and antrum\par -Tumor invades through entire thickness of gastric wall and into liver parenchyma. \par -All surgical margins( proximal ,distal, radial and liver resection margins) are free of tumor.\par - Focal lymphovascular space invasion present \par - No peripheral invasion seen\par -Metastatic adenocarcinoma involving one of twenty five lymph nodes(1/25)\par -Metastatic focus in lymph node measures 2.2cm\par -Background stomach shows intestinal metaplasia\par -AJCC 8th ed pathologic tumor stage: [id number]\par F.Lymph node, left gastric , excision:  One benign lymph node(0/1)\par G .Lymph node, Infrapancreatic, excision: Metastatic adenocarcinoma involving one of one lymph node(1/1) \par \par 2/3/21 CT CAP At [facility]\par 1.Interval development of left hepatic vein thrombosis as well as a large geographic region of hypodensity throughout the left hepatic lobe measuring 8.4x7.2x7.8cm. Finding could represent a perfusional anomaly related to left hepatic vein occlusion. However, a large metastatic lesion with associated left hepatic vein thrombosis is not excluded. Additionally, there is a 1.3am hypodensity in hepatic seg 6/7, which is also new and raises concern for a small metastatic focus. \par 2.Stable postsurgical changes of total gastrectomy and esophagojejunostomy and jejunojejunostomy , jejunostomy tube in place. No evidence of recurrence. No bowel obstruction. \par 3. No pulmonary nodule, focal consolidation, pleural effusion, or pneumothorax. No thoracic lymphadenopathy. \par 4. No abdominopelvic lymphadenopathy. \par \par 2/24/21 MRI Abdomen\par 1.Gastric adenocarcinoma s/p total gastrectomy with postsurgical appearance. No suspicious nodularity/enhancement at the surgical sites to suggest local recurrence. \par 2. Since the prior to CT dated 11/3/20 there has been interval increased atrophy of the left lateral segment, with  lack of uptake notes on hepatobiliary phase imaging  in a geographic distribution, suggestive of post radiation treatment changes. There is associated diminished caliber of the left portal vein and occluded left hepatic vein. Findings are most likely related to post radiation treatment changes of the left lateral segment. Infiltrative metastasis is considered much less likely. Recommend a 3 months f/u\par 3.Interval development of a new discrete 2 cm segment 6/7 hepatic lesion since the prior to CT AP dated 11/3/20 suspicious for viable hepatic metastasis. Additional indeterminate 0.3cm segment 5 hepatic lesion for which hepatic metastasis also remains on the differential diagnosis. \par \par 3/23/21\par CT guided liver biopsy\par -Adenocarcinoma, moderately-poorly differentiated \par positive for CK7, negative for CK20, CDX-2 Hepar-1\par This pattern of staining supports the diagnosis, but is not specific in regards to site of origin. Given  patient's history of gastric cancer, this morphology and immunophenotype would be compatible with a metastasis from that site.  \par \par 5/27/21 CT AP at [facility]\par 2.1 cm x 1.9 cm right hepatic mass previously 1.3x0.9cm. 1.1cm hypodense lesion right hepatic lobe not seen previously. Previously seen 8.4cmx7.2cm ill defined hypodensity throughout the left hepatic lobe is no longer seen. Imp: right hepatic mass increased in size. New small right hepatic lesion suspicious metastasis.\par \par 07/13/21 MR Abdomen w/wo IV Contrast ([facility])\par Progressive atrophy of the left lobe of the liver and metastatic lesion with hypoenhancing defect on hepatobiliary phase imaging measuring approximately 4.2 x 3.2 cm, decreased in size from February CT where it measured 8.4 x 7.2 cm and from May CT where it measured approximately 4.3 x 4.1 cm. Hypoenhancing segment 6/7 lesion measuring 2.1 cm on prior CT scan measures 1.4 cm on the current exam. Segment 6 lesion is not readily seen on current study. \par No new liver metastasis are identified. MRI Findings of hepatic iron deposition. Hemochromatosis protocol by be performed as needed. Asymmetric left hemidiaphragm elevation as above. \par \par 8/9/21 MRI Abdomen\par 1. Minimal interval decreased size left hepatic lobe hypoenhancing lesion now measuring 3.9cm, previously measuring 4.2cm\par 2. Interval decreased size right hepatic lobe segment 6/7 lesion measuring 1.0 cm previously measuring 1.4cm\par 3. No significant change right hepatic lobe segment 6 lesion ,measuring 0.7cm\par 4. No signs of hepatic iron deposition. \par \par 12/1/21 CT CHEST WITHOUT CONTRAST\par 1. No intrathoracic metastatic disease\par 2. For evaluation of contents of the upper abdomen, please refer to the MRI abdomen study scheduled to be performed on the same day \par \par 12/1/21 MRI ABDOMEN WITHOUT/WITH IV CONTRAST\par 1. Status post total gastrectomy and left lateral segmentectomy as well as post chemoradiation of liver metastases, currently on immunotherapy. Evolving post radiation treatment changes in the medial left hepatic lobe since the prior study dated 8/9/2021 (without discrete lesion noted on today's exam). Continued slight decrease in size of 2 right hepatic lobe metastases which measure up to 0.9 cm in segment 7 on the current study. No new liver metastases on the current study.\par 2. 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 Post radiation treatment changes to the bone marrow. \par \par 3/1/22 CT CHEST at [facility]\par No metastatic disease\par \par 03/10/22 MR Abdomen w/wo Contrast:\par Right hepatic lobe segment 7 hypoenhancing lesion now measures 0.9 cm. Right hepatic lobe segment 6 measuring 0.4 cm, previously 0.6 cm .Stable redemonstration of post treatment changes of the liver. Stable redemonstration of segment 7 hypoenhancing lesion. Slight interval decrease of size of segment 6 non enhancing lesion. Continued surveillance is advised. Non specific low signal in the left lower lobe, likely post radiation changes. \par \par ct DNA not detected (7/6/21, 10/12/21, 3/7/22)\par \par Plan\par # Stage III(ypT4b,pN2,cMo AJCC 8th ed) MSI-H, Her2 -ve, KRAS-ve, PD- L1 expression CPS 5  Gastric cancer, now with recurrent dz with liver mets\par -s/p C8 FLOT, Total gastrectomy with esophagojejunostomy, Xeloda/RT completed 12/20. Noted to have local and hep recurrence\par -Started C1 Pembrolizumab with Dr. Mendoza at [place] on 4/20/21. CT AP on 5/27 with mixed response. MR Abdomen/Pelvis on 8/9/21 showing decrease in size of liver mets.  MRI on 12/1/21 showing continued slightly decrease in size of liver w/o new disease. Status post total gastrectomy and left lateral segmentectomy as well as post chemoradiation of liver metastases, currently on immunotherapy. Evolving post radiation treatment changes in the medial left hepatic lobe since the prior study dated 8/9/2021 (without discrete lesion noted on today's exam). Continued slight decrease in size of 2 right hepatic lobe metastases which measure up to 0.9 cm in segment 7 on the current study. No new liver metastases on the current study.\par - Received C15 Pembro on 2/18/22 ct Pembro  C16 will be on 3/11/22. \par - Saw surgeon at [facility] and not candidate for surgery  at this time. At that time, recommended continued treatment for 2- 3 months and reevaluate. . \par - SW f/u\par -CEA 5.1 (3/8/22), 7.1 (1/27/22), 6.5 (12/15/21), 4.8(11/10/21),5.7(10/12/21), 6.1(9/14/21),  7 ( 8/25/21),  42.3 (06/14/21) and 253 (05/05/21). \par -ct DNA not detected (7/6/21, 10/12/21, 03/07/22) \par -Check CBC, CMP, CEA, Iron/TIBC, Ferritin, B12, Vit D,and  TSH\par -Repeated CT Chest and MRI Abdomen on 3/1/22 at [facility], which showed continued disease regression. Will suggest evaluation with Surgical Oncology for possible metastatectomy; would favor R0 resection over continued immunotherapy. Per patient, he has an appointment with Surgical Oncology in 06/2022. \par -Will repeat PET/CT in 3 months \par \par # Peripheral neuropathy\par - Continue Lyrica 75mg bid  as per Dr. Tavarez. \par \par RTC in 1 month

## 2022-04-04 LAB
25(OH)D3 SERPL-MCNC: 36.4 NG/ML
CEA SERPL-MCNC: 6.2 NG/ML
TSH SERPL-ACNC: 0.95 UIU/ML
VIT B12 SERPL-MCNC: 532 PG/ML

## 2022-04-26 ENCOUNTER — APPOINTMENT (OUTPATIENT)
Dept: HEMATOLOGY ONCOLOGY | Facility: CLINIC | Age: 67
End: 2022-04-26
Payer: MEDICAID

## 2022-04-26 VITALS
OXYGEN SATURATION: 99 % | WEIGHT: 134 LBS | HEART RATE: 71 BPM | TEMPERATURE: 97.5 F | RESPIRATION RATE: 18 BRPM | BODY MASS INDEX: 19.18 KG/M2 | DIASTOLIC BLOOD PRESSURE: 65 MMHG | HEIGHT: 70 IN | SYSTOLIC BLOOD PRESSURE: 104 MMHG

## 2022-04-26 DIAGNOSIS — R89.7 ABNORMAL HISTOLOGICAL FINDINGS IN SPECIMENS FROM OTHER ORGANS, SYSTEMS AND TISSUES: ICD-10-CM

## 2022-04-26 LAB
ALBUMIN SERPL ELPH-MCNC: 4.3 G/DL
ALP BLD-CCNC: 149 U/L
ALT SERPL-CCNC: 38 U/L
ANION GAP SERPL CALC-SCNC: 8 MMOL/L
AST SERPL-CCNC: 42 U/L
BASOPHILS # BLD AUTO: 0.05 K/UL
BASOPHILS NFR BLD AUTO: 1.6 %
BILIRUB SERPL-MCNC: 0.4 MG/DL
BUN SERPL-MCNC: 15 MG/DL
CALCIUM SERPL-MCNC: 8.9 MG/DL
CHLORIDE SERPL-SCNC: 109 MMOL/L
CO2 SERPL-SCNC: 27 MMOL/L
CREAT SERPL-MCNC: 0.73 MG/DL
EGFR: 102 ML/MIN/1.73M2
EOSINOPHIL # BLD AUTO: 0.22 K/UL
EOSINOPHIL NFR BLD AUTO: 6.9 %
GLUCOSE SERPL-MCNC: 77 MG/DL
HCT VFR BLD CALC: 33.7 %
HGB BLD-MCNC: 10.5 G/DL
IMM GRANULOCYTES NFR BLD AUTO: 0 %
LYMPHOCYTES # BLD AUTO: 0.82 K/UL
LYMPHOCYTES NFR BLD AUTO: 25.7 %
MAN DIFF?: NORMAL
MCHC RBC-ENTMCNC: 29 PG
MCHC RBC-ENTMCNC: 31.2 GM/DL
MCV RBC AUTO: 93.1 FL
MONOCYTES # BLD AUTO: 0.35 K/UL
MONOCYTES NFR BLD AUTO: 11 %
NEUTROPHILS # BLD AUTO: 1.75 K/UL
NEUTROPHILS NFR BLD AUTO: 54.8 %
PLATELET # BLD AUTO: 175 K/UL
POTASSIUM SERPL-SCNC: 4.1 MMOL/L
PROT SERPL-MCNC: 6.4 G/DL
RBC # BLD: 3.62 M/UL
RBC # FLD: 14.2 %
SODIUM SERPL-SCNC: 144 MMOL/L
WBC # FLD AUTO: 3.19 K/UL

## 2022-04-26 PROCEDURE — 99214 OFFICE O/P EST MOD 30 MIN: CPT

## 2022-04-26 RX ORDER — APIXABAN 5 MG/1
5 TABLET, FILM COATED ORAL
Qty: 60 | Refills: 4 | Status: DISCONTINUED | COMMUNITY
Start: 2022-03-07 | End: 2022-04-26

## 2022-04-26 NOTE — ASSESSMENT
[FreeTextEntry1] : Mr. Murry is 66yr M with Stage III(ypT4b,pN2,cMo AJCC 8th ed) poorly  differentiated gastric cancer of the body and antrum with MSI-H, Her2 -ve, KRAS WT, PD- L1 expression with CPS 5, s/p 8 cycles of FLOT (4/16/20-8/12/20), Total gastrectomy with esophagojejunostomy with enblock resection of segment 2/3, periportal and D2 lymphadenectomy, feeding jejunostomy, complete omentectomy,oversewing of duodenum (9/14/20), (Xeloda)RT(12/14/20). Now with recurrent dz,isolated biopsy proven liver met segment 6. Started C1 Pembrolizumab with Dr. Mendoza at [place] on 4/20/21. S/P C13 Pembro on 1/7/22. He gets Pembro at [facility] due to proximity to his home. Tolerating Tx well, symptomatically improved and CEA has been trending down.  Now CEA slightly increasing.CT Chest and MRI Abdomen on 3/1/22 at [facility], which showed continued disease regression. Here today for f/u\par \par 9/14/20\par surgical pathology report\par A. Omentum, Omentectomy: Benign omental fibroadipose tissue with chronic inflammation and reactive mesothelium\par B Lymph node, common bile duct , excision: One benign lymph node(0/1)\par C. Lymph node, Hepatic artery , excision: Metastatic adenocarcinoma involving three of three lymph node(3/3)\par D. Lymph node, portal vein excision: One benign lymph node(0/1)\par E. Stomach and Liber, Total gastrectomy and partial hepatectomy \par - Invasive poorly differentiated adenocarcinoma, measuring 8.0cm, predominantly involving gastric body and antrum\par -Tumor invades through entire thickness of gastric wall and into liver parenchyma. \par -All surgical margins( proximal ,distal, radial and liver resection margins) are free of tumor.\par - Focal lymphovascular space invasion present \par - No peripheral invasion seen\par -Metastatic adenocarcinoma involving one of twenty five lymph nodes(1/25)\par -Metastatic focus in lymph node measures 2.2cm\par -Background stomach shows intestinal metaplasia\par -AJCC 8th ed pathologic tumor stage: [id number]\par F.Lymph node, left gastric , excision:  One benign lymph node(0/1)\par G .Lymph node, Infrapancreatic, excision: Metastatic adenocarcinoma involving one of one lymph node(1/1) \par \par 2/3/21 CT CAP At [facility]\par 1.Interval development of left hepatic vein thrombosis as well as a large geographic region of hypodensity throughout the left hepatic lobe measuring 8.4x7.2x7.8cm. Finding could represent a perfusional anomaly related to left hepatic vein occlusion. However, a large metastatic lesion with associated left hepatic vein thrombosis is not excluded. Additionally, there is a 1.3am hypodensity in hepatic seg 6/7, which is also new and raises concern for a small metastatic focus. \par 2.Stable postsurgical changes of total gastrectomy and esophagojejunostomy and jejunojejunostomy , jejunostomy tube in place. No evidence of recurrence. No bowel obstruction. \par 3. No pulmonary nodule, focal consolidation, pleural effusion, or pneumothorax. No thoracic lymphadenopathy. \par 4. No abdominopelvic lymphadenopathy. \par \par 2/24/21 MRI Abdomen\par 1.Gastric adenocarcinoma s/p total gastrectomy with postsurgical appearance. No suspicious nodularity/enhancement at the surgical sites to suggest local recurrence. \par 2. Since the prior to CT dated 11/3/20 there has been interval increased atrophy of the left lateral segment, with  lack of uptake notes on hepatobiliary phase imaging  in a geographic distribution, suggestive of post radiation treatment changes. There is associated diminished caliber of the left portal vein and occluded left hepatic vein. Findings are most likely related to post radiation treatment changes of the left lateral segment. Infiltrative metastasis is considered much less likely. Recommend a 3 months f/u\par 3.Interval development of a new discrete 2 cm segment 6/7 hepatic lesion since the prior to CT AP dated 11/3/20 suspicious for viable hepatic metastasis. Additional indeterminate 0.3cm segment 5 hepatic lesion for which hepatic metastasis also remains on the differential diagnosis. \par \par 3/23/21\par CT guided liver biopsy\par -Adenocarcinoma, moderately-poorly differentiated \par positive for CK7, negative for CK20, CDX-2 Hepar-1\par This pattern of staining supports the diagnosis, but is not specific in regards to site of origin. Given  patient's history of gastric cancer, this morphology and immunophenotype would be compatible with a metastasis from that site.  \par \par 5/27/21 CT AP at [facility]\par 2.1 cm x 1.9 cm right hepatic mass previously 1.3x0.9cm. 1.1cm hypodense lesion right hepatic lobe not seen previously. Previously seen 8.4cmx7.2cm ill defined hypodensity throughout the left hepatic lobe is no longer seen. Imp: right hepatic mass increased in size. New small right hepatic lesion suspicious metastasis.\par \par 07/13/21 MR Abdomen w/wo IV Contrast ([facility])\par Progressive atrophy of the left lobe of the liver and metastatic lesion with hypoenhancing defect on hepatobiliary phase imaging measuring approximately 4.2 x 3.2 cm, decreased in size from February CT where it measured 8.4 x 7.2 cm and from May CT where it measured approximately 4.3 x 4.1 cm. Hypoenhancing segment 6/7 lesion measuring 2.1 cm on prior CT scan measures 1.4 cm on the current exam. Segment 6 lesion is not readily seen on current study. \par No new liver metastasis are identified. MRI Findings of hepatic iron deposition. Hemochromatosis protocol by be performed as needed. Asymmetric left hemidiaphragm elevation as above. \par \par 8/9/21 MRI Abdomen\par 1. Minimal interval decreased size left hepatic lobe hypoenhancing lesion now measuring 3.9cm, previously measuring 4.2cm\par 2. Interval decreased size right hepatic lobe segment 6/7 lesion measuring 1.0 cm previously measuring 1.4cm\par 3. No significant change right hepatic lobe segment 6 lesion ,measuring 0.7cm\par 4. No signs of hepatic iron deposition. \par \par 12/1/21 CT CHEST WITHOUT CONTRAST\par 1. No intrathoracic metastatic disease\par 2. For evaluation of contents of the upper abdomen, please refer to the MRI abdomen study scheduled to be performed on the same day \par \par 12/1/21 MRI ABDOMEN WITHOUT/WITH IV CONTRAST\par 1. Status post total gastrectomy and left lateral segmentectomy as well as post chemoradiation of liver metastases, currently on immunotherapy. Evolving post radiation treatment changes in the medial left hepatic lobe since the prior study dated 8/9/2021 (without discrete lesion noted on today's exam). Continued slight decrease in size of 2 right hepatic lobe metastases which measure up to 0.9 cm in segment 7 on the current study. No new liver metastases on the current study.\par 2. 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 Post radiation treatment changes to the bone marrow. \par \par 3/1/22 CT CHEST at [facility]\par No metastatic disease\par \par 03/10/22 MR Abdomen w/wo Contrast:\par Right hepatic lobe segment 7 hypoenhancing lesion now measures 0.9 cm. Right hepatic lobe segment 6 measuring 0.4 cm, previously 0.6 cm .Stable redemonstration of post treatment changes of the liver. Stable redemonstration of segment 7 hypoenhancing lesion. Slight interval decrease of size of segment 6 non enhancing lesion. Continued surveillance is advised. Non specific low signal in the left lower lobe, likely post radiation changes. \par \par ct DNA not detected (7/6/21, 10/12/21, 3/7/22)\par \par Plan\par # Stage III(ypT4b,pN2,cMo AJCC 8th ed) MSI-H, Her2 -ve, KRAS-ve, PD- L1 expression CPS 5  Gastric cancer, now with recurrent dz with liver mets\par -s/p C8 FLOT, Total gastrectomy with esophagojejunostomy, Xeloda/RT completed 12/20. Noted to have local and hep recurrence\par -Started C1 Pembrolizumab with Dr. Mendoza at [place] on 4/20/21. CT AP on 5/27 with mixed response. MR Abdomen/Pelvis on 8/9/21 showing decrease in size of liver mets.  MRI on 12/1/21 showing continued slightly decrease in size of liver w/o new disease. Status post total gastrectomy and left lateral segmentectomy as well as post chemoradiation of liver metastases, currently on immunotherapy. Evolving post radiation treatment changes in the medial left hepatic lobe since the prior study dated 8/9/2021 (without discrete lesion noted on today's exam). Continued slight decrease in size of 2 right hepatic lobe metastases which measure up to 0.9 cm in segment 7 on the current study. No new liver metastases on the current study.\par - Received C18 Pembro on 4/22/22 ct Pembro  C19 will be on 5/13/22. \par - Saw surgeon at [facility] and not candidate for surgery  at this time. At that time, recommended continued treatment for 2- 3 months and reevaluate. . \par - SW f/u\par -CEA 6.2 (3/31/22), 5.1 (3/8/22), 7.1 (1/27/22), 6.5 (12/15/21), 4.8(11/10/21),5.7(10/12/21), 6.1(9/14/21),  7 ( 8/25/21),  42.3 (06/14/21) and 253 (05/05/21). \par -ct DNA not detected (7/6/21, 10/12/21, 03/07/22) Will repeat in June\par -Check CBC, CMP, CEA, Iron/TIBC, Ferritin, B12, Vit D,and  TSH\par -Repeated CT Chest and MRI Abdomen on 3/1/22 at [facility], which showed continued disease regression. Will suggest evaluation with Surgical Oncology for possible metastasectomy; would favor R0 resection over continued immunotherapy. Per patient, he has an appointment with Surgical Oncology and repeat MRI in 06/2022. \par \par # Increased fatigue\par -Will check ACTH and cortisol level at next visit\par -TSH nl\par \par # decreased libido\par -Recommend urology if worse\par \par # Peripheral neuropathy- stable\par - Continue Lyrica 75mg bid  as per Dr. Tavarez. \par \par RTC in mid June after MRI

## 2022-04-26 NOTE — REVIEW OF SYSTEMS
[Fatigue] : fatigue [Insomnia] : insomnia [Negative] : Psychiatric [Fever] : no fever [Chills] : no chills [Night Sweats] : no night sweats [Recent Change In Weight] : ~T no recent weight change [Eye Pain] : no eye pain [Dysphagia] : no dysphagia [Odynophagia] : no odynophagia [Chest Pain] : no chest pain [Lower Ext Edema] : no lower extremity edema [Shortness Of Breath] : no shortness of breath [Wheezing] : no wheezing [Cough] : no cough [Abdominal Pain] : no abdominal pain [Vomiting] : no vomiting [Constipation] : no constipation [Diarrhea] : no diarrhea [Dysuria] : no dysuria [Muscle Weakness] : no muscle weakness [Confused] : no confusion [Dizziness] : no dizziness [Fainting] : no fainting [Difficulty Walking] : no difficulty walking [FreeTextEntry2] : loss of appetite  [de-identified] : chronic tingling to fingers/toes, loss of libido

## 2022-04-26 NOTE — HISTORY OF PRESENT ILLNESS
[de-identified] : Mr. Murry is 66yr M with Stage III(ypT4b,pN2,cMo AJCC 8th ed) poorly  differentiated gastric cancer of the body and antrum with MSI-H, Her2 -ve, KRAS WT, PD- L1 expression with CPS 5, s/p 8 cycles of FLOT (4/16/20-8/12/20), Total gastrectomy with esophagojejunostomy with enblock resection of segment 2/3, periportal and D2 lymphadenectomy, feeding jejunostomy, complete omentectomy,oversewing of duodenum (9/14/20), (Xeloda)RT(12/14/20). Now with recurrent dz,isolated biopsy proven liver met segment 6. Started C1 Pembrolizumab with Dr. Mendoza at [place] on 4/20/21. He went to the ED at [facility] for pain and leakage from PEG tube on 5/27 and repeated CT AP with mixed response and PEG tube was removed after that. S/P C13 Pembro on 1/7/22.  He gets Pembro at [facility] due to proximity to his home. Tolerating Tx well, symptomatically improved and CEA has been trending down. MR Abdomen (8/9/21) showing decreased in left and right lobe mets.Continued slight decrease in size of 2 right hepatic lobe metastases which measure up to 0.9 cm in segment 7 on the current study. No new liver metastases on MRI dated in December 2021. Now CEA slightly increasing. Patient here for f/u  \par \par He was diagnosed with  Stage III(ypT4b,pN2,cMo) poorly  differentiated gastric cancer of the body and antrum, Her2 negative. He completed 8 cycles of FLOT chemotherapy 4/16/20-8/12/20  then had total gastrectomy with esophagojejunostomy with enblock resection of segment 2/3, periportal and D2 lymphadenectomy, feeding jejunostomy, complete omentectomy,oversewing of duodenum on 9/14/20 and completed post-op chemo(Xeloda)RT 12/14/20 at [facility]. Surveillance CT AP on 2/3/21 showed interval development of left hepatic vein thrombosis as well as a large geographic region of hypodensity throughout the left hepatic lobe measuring 8.4x7.2x7.8 cm . He underwent liver biopsy on 3/23/21, path confirmed moderately differentiated adenocarcinoma, positive for CK7, negative for CK20, CDX-2 , Hepar-1.  His oncologist at [facility] is suggesting he has Immunotherapy. He has sought second opinion from [facility] and is now here for a third opinion. He is reluctant to offer in depth information regarding Phx.\par \par 9/14/20\par surgical pathology report\par A. Omentum, Omentectomy: Benign omental fibroadipose tissue with chronic inflammation and reactive mesothelium\par B Lymph node, common bile duct , excision: One benign lymph node(0/1)\par C. Lymph node, Hepatic artery , excision: Metastatic adenocarcinoma involving three of three lymph node(3/3)\par D. Lymph node, portal vein excision: One benign lymph node(0/1)\par E. Stomach and Liber, Total gastrectomy and partial hepatectomy \par - Invasive poorly differentiated adenocarcinoma, measuring 8.0cm, predominantly involving gastric body and antrum\par -Tumor invades through entire thickness of gastric wall and into liver parenchyma. \par -All surgical margins( proximal ,distal, radial and liver resection margins) are free of tumor.\par - Focal lymphovascular space invasion present \par - No peripheral invasion seen\par -Metastatic adenocarcinoma involving one of twenty five lymph nodes(1/25)\par -Metastatic focus in lymph node measures 2.2cm\par -Background stomach shows intestinal metaplasia\par -AJCC 8th ed pathologic tumor stage: [id number]\par F.Lymph node, left gastric , excision:  One benign lymph node(0/1)\par G .Lymph node, Infrapancreatic, excision: Metastatic adenocarcinoma involving one of one lymph node(1/1) \par \par 2/3/21 CT CAP At [facility]\par 1.Interval development of left hepatic vein thrombosis as well as a large geographic region of hypodensity throughout the left hepatic lobe measuring 8.4x7.2x7.8cm. Finding could represent a perfusional anomaly related to left hepatic vein occlusion. However, a large metastatic lesion with associated left hepatic vein thrombosis is not excluded. Additionally, there is a 1.3am hypodensity in hepatic seg 6/7, which is also new and raises concern for a small metastatic focus. \par 2.Stable postsurgical changes of total gastrectomy and esophagojejunostomy and jejunojejunostomy , jejunostomy tube in place. No evidence of recurrence. No bowel obstruction. \par 3. No pulmonary nodule, focal consolidation, pleural effusion, or pneumothorax. No thoracic lymphadenopathy. \par 4. No abdominopelvic lymphadenopathy. \par \par 2/24/21 MRI Abdomen\par 1.Gastric adenocarcinoma s/p total gastrectomy with postsurgical appearance. No suspicious nodularity/enhancement at the surgical sites to suggest local recurrence. \par 2. Since the prior to CT dated 11/3/20 there has been interval increased atrophy of the left lateral segment, with  lack of uptake notes on hepatobiliary phase imaging  in a geographic distribution, suggestive of post radiation treatment changes. There is associated diminished caliber of the left portal vein and occluded left hepatic vein. Findings are most likely related to post radiation treatment changes of the left lateral segment. Infiltrative metastasis is considered much less likely. Recommend a 3 months f/u\par 3.Interval development of a new discrete 2 cm segment 6/7 hepatic lesion since the prior to CT AP dated 11/3/20 suspicious for viable hepatic metastasis. Additional indeterminate 0.3cm segment 5 hepatic lesion for which hepatic metastasis also remains on the differential diagnosis. \par \par 3/23/21\par CT guided liver biopsy\par -Adenocarcinoma, moderately-poorly differentiated \par positive for CK7, negative for CK20, CDX-2 Hepar-1\par This pattern of staining supports the diagnosis, but is not specific in regards to site of origin. Given  patient's history of gastric cancer, this morphology and immunophenotype would be compatible with a metastasis from that site.  \par \par 5/27/21 CT AP at [facility]\par 2.1 cm x 1.9 cm right hepatic mass previously 1.3x0.9cm. 1.1cm hypodense lesion right hepatic lobe not seen previously. Previously seen 8.4cmx7.2cm ill defined hypodensity throughout the left hepatic lobe is no longer seen. Imp: right hepatic mass increased in size. New small right hepatic lesion suspicious metastasis\par \par 07/13/21 MR Abdomen w/wo IV Contrast ([facility])\par Progressive atrophy of the left lobe of the liver and metastatic lesion with hypoenhancing defect on hepatobiliary phase imaging measuring approximately 4.2 x 3.2 cm, decreased in size from February CT where it measured 8.4 x 7.2 cm and from May CT where it measured approximately 4.3 x 4.1 cm. Hypoenhancing segment 6/7 lesion measuring 2.1 cm on prior CT scan measures 1.4 cm on the current exam. Segment 6 lesion is not readily seen on current study. \par No new liver metastasis are identified. MRI Findings of hepatic iron deposition. Hemochromatosis protocol by be performed as needed. Asymmetric left hemidiaphragm elevation as above. \par \par 8/9/21 MRI Abdomen\par 1. Minimal interval decreased size left hepatic lobe hypoenhancing lesion now measuring 3.9cm, previously measuring 4.2cm\par 2. Interval decreased size right hepatic lobe segment 6/7 lesion measuring 1.0 cm previously measuring 1.4cm\par 3. No significant change right hepatic lobe segment 6 lesion ,measuring 0.7cm\par 4. No signs of hepatic iron deposition. \par \par 12/1/21 CT CHEST WITHOUT CONTRAST\par 1. No intrathoracic metastatic disease\par 2. For evaluation of contents of the upper abdomen, please refer to the MRI abdomen study scheduled to be performed on the same day \par \par 12/1/21 MRI ABDOMEN WITHOUT/WITH IV CONTRAST\par 1. Status post total gastrectomy and left lateral segmentectomy as well as post chemoradiation of liver metastases, currently on immunotherapy. Evolving post radiation treatment changes in the medial left hepatic lobe since the prior study dated 8/9/2021 (without discrete lesion noted on today's exam). Continued slight decrease in size of 2 right hepatic lobe metastases which measure up to 0.9 cm in segment 7 on the current study. No new liver metastases on the current study.\par 2. 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 Post radiation treatment changes to the bone marrow. \par \par 3/1/22 CT CHEST at [facility]\par No metastatic disease\par \par 03/10/22 MR Abdomen w/wo Contrast:\par Right hepatic lobe segment 7 hypoenhancing lesion now measures 0.9 cm. Right hepatic lobe segment 6 measuring 0.4 cm, previously 0.6 cm .Stable redemonstration of post treatment changes of the liver. Stable redemonstration of segment 7 hypoenhancing lesion. Slight interval decrease of size of segment 6 non enhancing lesion. Continued surveillance is advised. Non specific low signal in the left lower lobe, likely post radiation changes. \par \par ct DNA not detected (7/6/21, 10/12/21, 3/7/22)\par \par  [de-identified] : Patient presents to clinic for f/u. On Pembro every 3 weeks. Continues to have consistent peripheral neuropathy, now on pregabalin 75mg bid. Reports loss of appetite, fatigue, and insomnia. Also reports loss of libido. Denies any pain, SOB, chest pain, N/V/C/D, skin rash, or fever. \par \par

## 2022-04-27 LAB
CEA SERPL-MCNC: 6.1 NG/ML
TSH SERPL-ACNC: 0.94 UIU/ML

## 2022-06-06 ENCOUNTER — APPOINTMENT (OUTPATIENT)
Dept: NEUROLOGY | Facility: CLINIC | Age: 67
End: 2022-06-06
Payer: MEDICAID

## 2022-06-06 VITALS
SYSTOLIC BLOOD PRESSURE: 120 MMHG | OXYGEN SATURATION: 100 % | HEIGHT: 70 IN | HEART RATE: 63 BPM | TEMPERATURE: 97.3 F | WEIGHT: 130 LBS | BODY MASS INDEX: 18.61 KG/M2 | DIASTOLIC BLOOD PRESSURE: 71 MMHG

## 2022-06-06 PROCEDURE — 99213 OFFICE O/P EST LOW 20 MIN: CPT

## 2022-06-06 RX ORDER — ALPHA LIPOIC ACID 600 MG
600 TABLET ORAL
Qty: 90 | Refills: 3 | Status: ACTIVE | COMMUNITY
Start: 2022-06-06 | End: 1900-01-01

## 2022-06-06 NOTE — HISTORY OF PRESENT ILLNESS
[FreeTextEntry1] : Lyrica is helping more than gabapentin\par Dose was increased by PMD to 75mg TID which is helping more\par He is still getting chemotherapy and gastric tumor is shrinking \par \par Reviewed:\par Notes from heme/onc\par Labs - B12 normal, TSH 0.95

## 2022-06-06 NOTE — ASSESSMENT
[FreeTextEntry1] : Neuropathic pain improved on lyrica\par Continue lyrica 75mg TID, alpha lipoic acid 600mg daily\par f/u in 6 months

## 2022-06-21 ENCOUNTER — APPOINTMENT (OUTPATIENT)
Dept: HEMATOLOGY ONCOLOGY | Facility: CLINIC | Age: 67
End: 2022-06-21

## 2022-06-21 NOTE — REVIEW OF SYSTEMS
Bed: 024A  Expected date:   Expected time:   [facility]  Comments:     Ced De RN  08/31/21 0218 [Fatigue] : fatigue [Insomnia] : insomnia [Negative] : Allergic/Immunologic [Fever] : no fever [Chills] : no chills [Night Sweats] : no night sweats [Recent Change In Weight] : ~T no recent weight change [Eye Pain] : no eye pain [Dysphagia] : no dysphagia [Odynophagia] : no odynophagia [Chest Pain] : no chest pain [Lower Ext Edema] : no lower extremity edema [Shortness Of Breath] : no shortness of breath [Wheezing] : no wheezing [Cough] : no cough [Abdominal Pain] : no abdominal pain [Vomiting] : no vomiting [Constipation] : no constipation [Diarrhea] : no diarrhea [Dysuria] : no dysuria [Muscle Weakness] : no muscle weakness [Confused] : no confusion [Dizziness] : no dizziness [Fainting] : no fainting [Difficulty Walking] : no difficulty walking [FreeTextEntry2] : loss of appetite  [de-identified] : chronic tingling to fingers/toes, loss of libido

## 2022-06-21 NOTE — ASSESSMENT
[FreeTextEntry1] : Mr. Murry is 66yr M with Stage III(ypT4b,pN2,cMo AJCC 8th ed) poorly  differentiated gastric cancer of the body and antrum with MSI-H, Her2 -ve, KRAS WT, PD- L1 expression with CPS 5, s/p 8 cycles of FLOT (4/16/20-8/12/20), Total gastrectomy with esophagojejunostomy with enblock resection of segment 2/3, periportal and D2 lymphadenectomy, feeding jejunostomy, complete omentectomy,oversewing of duodenum (9/14/20), (Xeloda)RT(12/14/20). Now with recurrent dz,isolated biopsy proven liver met segment 6. Started C1 Pembrolizumab with Dr. Mendoza at [facility] on 4/20/21. S/P C13 Pembro on 1/7/22. He gets Pembro at [facility] due to proximity to his home. Tolerating Tx well, symptomatically improved and CEA has been trending down.  Now CEA slightly increasing.CT Chest and MRI Abdomen on 3/1/22 at [facility], which showed continued disease regression. Now CEA slightly increasing. Here today for f/u\par \par 9/14/20\par surgical pathology report\par A. Omentum, Omentectomy: Benign omental fibroadipose tissue with chronic inflammation and reactive mesothelium\par B Lymph node, common bile duct , excision: One benign lymph node(0/1)\par C. Lymph node, Hepatic artery , excision: Metastatic adenocarcinoma involving three of three lymph node(3/3)\par D. Lymph node, portal vein excision: One benign lymph node(0/1)\par E. Stomach and Liber, Total gastrectomy and partial hepatectomy \par - Invasive poorly differentiated adenocarcinoma, measuring 8.0cm, predominantly involving gastric body and antrum\par -Tumor invades through entire thickness of gastric wall and into liver parenchyma. \par -All surgical margins( proximal ,distal, radial and liver resection margins) are free of tumor.\par - Focal lymphovascular space invasion present \par - No peripheral invasion seen\par -Metastatic adenocarcinoma involving one of twenty five lymph nodes(1/25)\par -Metastatic focus in lymph node measures 2.2cm\par -Background stomach shows intestinal metaplasia\par -AJCC 8th ed pathologic tumor stage: [id number]\par F.Lymph node, left gastric , excision:  One benign lymph node(0/1)\par G .Lymph node, Infrapancreatic, excision: Metastatic adenocarcinoma involving one of one lymph node(1/1) \par \par 2/3/21 CT CAP At [facility]\par 1.Interval development of left hepatic vein thrombosis as well as a large geographic region of hypodensity throughout the left hepatic lobe measuring 8.4x7.2x7.8cm. Finding could represent a perfusional anomaly related to left hepatic vein occlusion. However, a large metastatic lesion with associated left hepatic vein thrombosis is not excluded. Additionally, there is a 1.3am hypodensity in hepatic seg 6/7, which is also new and raises concern for a small metastatic focus. \par 2.Stable postsurgical changes of total gastrectomy and esophagojejunostomy and jejunojejunostomy , jejunostomy tube in place. No evidence of recurrence. No bowel obstruction. \par 3. No pulmonary nodule, focal consolidation, pleural effusion, or pneumothorax. No thoracic lymphadenopathy. \par 4. No abdominopelvic lymphadenopathy. \par \par 2/24/21 MRI Abdomen\par 1.Gastric adenocarcinoma s/p total gastrectomy with postsurgical appearance. No suspicious nodularity/enhancement at the surgical sites to suggest local recurrence. \par 2. Since the prior to CT dated 11/3/20 there has been interval increased atrophy of the left lateral segment, with  lack of uptake notes on hepatobiliary phase imaging  in a geographic distribution, suggestive of post radiation treatment changes. There is associated diminished caliber of the left portal vein and occluded left hepatic vein. Findings are most likely related to post radiation treatment changes of the left lateral segment. Infiltrative metastasis is considered much less likely. Recommend a 3 months f/u\par 3.Interval development of a new discrete 2 cm segment 6/7 hepatic lesion since the prior to CT AP dated 11/3/20 suspicious for viable hepatic metastasis. Additional indeterminate 0.3cm segment 5 hepatic lesion for which hepatic metastasis also remains on the differential diagnosis. \par \par 3/23/21\par CT guided liver biopsy\par -Adenocarcinoma, moderately-poorly differentiated \par positive for CK7, negative for CK20, CDX-2 Hepar-1\par This pattern of staining supports the diagnosis, but is not specific in regards to site of origin. Given  patient's history of gastric cancer, this morphology and immunophenotype would be compatible with a metastasis from that site.  \par \par 5/27/21 CT AP at [facility]\par 2.1 cm x 1.9 cm right hepatic mass previously 1.3x0.9cm. 1.1cm hypodense lesion right hepatic lobe not seen previously. Previously seen 8.4cmx7.2cm ill defined hypodensity throughout the left hepatic lobe is no longer seen. Imp: right hepatic mass increased in size. New small right hepatic lesion suspicious metastasis.\par \par 07/13/21 MR Abdomen w/wo IV Contrast ([facility])\par Progressive atrophy of the left lobe of the liver and metastatic lesion with hypoenhancing defect on hepatobiliary phase imaging measuring approximately 4.2 x 3.2 cm, decreased in size from February CT where it measured 8.4 x 7.2 cm and from May CT where it measured approximately 4.3 x 4.1 cm. Hypoenhancing segment 6/7 lesion measuring 2.1 cm on prior CT scan measures 1.4 cm on the current exam. Segment 6 lesion is not readily seen on current study. \par No new liver metastasis are identified. MRI Findings of hepatic iron deposition. Hemochromatosis protocol by be performed as needed. Asymmetric left hemidiaphragm elevation as above. \par \par 8/9/21 MRI Abdomen\par 1. Minimal interval decreased size left hepatic lobe hypoenhancing lesion now measuring 3.9cm, previously measuring 4.2cm\par 2. Interval decreased size right hepatic lobe segment 6/7 lesion measuring 1.0 cm previously measuring 1.4cm\par 3. No significant change right hepatic lobe segment 6 lesion ,measuring 0.7cm\par 4. No signs of hepatic iron deposition. \par \par 12/1/21 CT CHEST WITHOUT CONTRAST\par 1. No intrathoracic metastatic disease\par 2. For evaluation of contents of the upper abdomen, please refer to the MRI abdomen study scheduled to be performed on the same day \par \par 12/1/21 MRI ABDOMEN WITHOUT/WITH IV CONTRAST\par 1. Status post total gastrectomy and left lateral segmentectomy as well as post chemoradiation of liver metastases, currently on immunotherapy. Evolving post radiation treatment changes in the medial left hepatic lobe since the prior study dated 8/9/2021 (without discrete lesion noted on today's exam). Continued slight decrease in size of 2 right hepatic lobe metastases which measure up to 0.9 cm in segment 7 on the current study. No new liver metastases on the current study.\par 2. 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 Post radiation treatment changes to the bone marrow. \par \par 3/1/22 CT CHEST at [facility]\par No metastatic disease\par \par 03/10/22 MR Abdomen w/wo Contrast:\par Right hepatic lobe segment 7 hypoenhancing lesion now measures 0.9 cm. Right hepatic lobe segment 6 measuring 0.4 cm, previously 0.6 cm .Stable redemonstration of post treatment changes of the liver. Stable redemonstration of segment 7 hypoenhancing lesion. Slight interval decrease of size of segment 6 non enhancing lesion. Continued surveillance is advised. Non specific low signal in the left lower lobe, likely post radiation changes. \par \par ct DNA not detected (7/6/21, 10/12/21, 3/7/22)\par \par Plan\par # Stage III(ypT4b,pN2,cMo AJCC 8th ed) MSI-H, Her2 -ve, KRAS-ve, PD- L1 expression CPS 5  Gastric cancer, now with recurrent dz with liver mets\par -s/p C8 FLOT, Total gastrectomy with esophagojejunostomy, Xeloda/RT completed 12/20. Noted to have local and hep recurrence\par -Started C1 Pembrolizumab with Dr. Mendoza at [facility] on 4/20/21. CT AP on 5/27 with mixed response. MR Abdomen/Pelvis on 8/9/21 showing decrease in size of liver mets.  MRI on 12/1/21 showing continued slightly decrease in size of liver w/o new disease. Status post total gastrectomy and left lateral segmentectomy as well as post chemoradiation of liver metastases, currently on immunotherapy. Evolving post radiation treatment changes in the medial left hepatic lobe since the prior study dated 8/9/2021 (without discrete lesion noted on today's exam). Continued slight decrease in size of 2 right hepatic lobe metastases which measure up to 0.9 cm in segment 7 on the current study. No new liver metastases on the current study.\par - Received C18 Pembro on 4/22/22 ct Pembro  C19 will be on 5/13/22. \par - Saw surgeon at [facility] and not candidate for surgery  at this time. At that time, recommended continued treatment for 2- 3 months and reevaluate. . \par - SW f/u\par -CEA 6.1 (4/27/22), 6.2 (3/31/22), 5.1 (3/8/22), 7.1 (1/27/22), 6.5 (12/15/21), 4.8(11/10/21),5.7(10/12/21), 6.1(9/14/21),  7 ( 8/25/21),  42.3 (06/14/21) and 253 (05/05/21). \par -ct DNA not detected (7/6/21, 10/12/21, 03/07/22) Will repeat today\par -Check CBC, CMP, CEA, Iron/TIBC, Ferritin, B12, Vit D,and  TSH\par -Repeated CT Chest and MRI Abdomen on 3/1/22 at [facility], which showed continued disease regression. Will suggest evaluation with Surgical Oncology for possible metastasectomy; would favor R0 resection over continued immunotherapy. Per patient, he has an appointment with Surgical Oncology and repeat MRI in 06/2022. \par \par # Increased fatigue\par -Will check ACTH and cortisol level at next visit\par -TSH nl\par \par # decreased libido\par -Recommend urology if worse\par \par # Peripheral neuropathy- stable\par - Continue Lyrica 75mg bid  as per Dr. Tavarez. \par \par RTC in mid June after MRI

## 2022-06-21 NOTE — HISTORY OF PRESENT ILLNESS
[de-identified] : Mr. Muryr is 66yr M with Stage III(ypT4b,pN2,cMo AJCC 8th ed) poorly  differentiated gastric cancer of the body and antrum with MSI-H, Her2 -ve, KRAS WT, PD- L1 expression with CPS 5, s/p 8 cycles of FLOT (4/16/20-8/12/20), Total gastrectomy with esophagojejunostomy with enblock resection of segment 2/3, periportal and D2 lymphadenectomy, feeding jejunostomy, complete omentectomy,oversewing of duodenum (9/14/20), (Xeloda)RT(12/14/20). Now with recurrent dz,isolated biopsy proven liver met segment 6. Started C1 Pembrolizumab with Dr. Mendoza at [place] on 4/20/21. He went to the ED at [facility] for pain and leakage from PEG tube on 5/27 and repeated CT AP with mixed response and PEG tube was removed after that. S/P C13 Pembro on 1/7/22.  He gets Pembro at [facility] due to proximity to his home. Tolerating Tx well, symptomatically improved and CEA has been trending down. MR Abdomen (8/9/21) showing decreased in left and right lobe mets.Continued slight decrease in size of 2 right hepatic lobe metastases which measure up to 0.9 cm in segment 7 on the current study. No new liver metastases on MRI dated in December 2021. Now CEA slightly increasing. Patient here for f/u  \par \par He was diagnosed with  Stage III(ypT4b,pN2,cMo) poorly  differentiated gastric cancer of the body and antrum, Her2 negative. He completed 8 cycles of FLOT chemotherapy 4/16/20-8/12/20  then had total gastrectomy with esophagojejunostomy with enblock resection of segment 2/3, periportal and D2 lymphadenectomy, feeding jejunostomy, complete omentectomy,oversewing of duodenum on 9/14/20 and completed post-op chemo(Xeloda)RT 12/14/20 at [facility]. Surveillance CT AP on 2/3/21 showed interval development of left hepatic vein thrombosis as well as a large geographic region of hypodensity throughout the left hepatic lobe measuring 8.4x7.2x7.8 cm . He underwent liver biopsy on 3/23/21, path confirmed moderately differentiated adenocarcinoma, positive for CK7, negative for CK20, CDX-2 , Hepar-1.  His oncologist at [facility] is suggesting he has Immunotherapy. He has sought second opinion from [facility] and is now here for a third opinion. He is reluctant to offer in depth information regarding Phx.\par \par 9/14/20\par surgical pathology report\par A. Omentum, Omentectomy: Benign omental fibroadipose tissue with chronic inflammation and reactive mesothelium\par B Lymph node, common bile duct , excision: One benign lymph node(0/1)\par C. Lymph node, Hepatic artery , excision: Metastatic adenocarcinoma involving three of three lymph node(3/3)\par D. Lymph node, portal vein excision: One benign lymph node(0/1)\par E. Stomach and Liber, Total gastrectomy and partial hepatectomy \par - Invasive poorly differentiated adenocarcinoma, measuring 8.0cm, predominantly involving gastric body and antrum\par -Tumor invades through entire thickness of gastric wall and into liver parenchyma. \par -All surgical margins( proximal ,distal, radial and liver resection margins) are free of tumor.\par - Focal lymphovascular space invasion present \par - No peripheral invasion seen\par -Metastatic adenocarcinoma involving one of twenty five lymph nodes(1/25)\par -Metastatic focus in lymph node measures 2.2cm\par -Background stomach shows intestinal metaplasia\par -AJCC 8th ed pathologic tumor stage: [id number]\par F.Lymph node, left gastric , excision:  One benign lymph node(0/1)\par G .Lymph node, Infrapancreatic, excision: Metastatic adenocarcinoma involving one of one lymph node(1/1) \par \par 2/3/21 CT CAP At [facility]\par 1.Interval development of left hepatic vein thrombosis as well as a large geographic region of hypodensity throughout the left hepatic lobe measuring 8.4x7.2x7.8cm. Finding could represent a perfusional anomaly related to left hepatic vein occlusion. However, a large metastatic lesion with associated left hepatic vein thrombosis is not excluded. Additionally, there is a 1.3am hypodensity in hepatic seg 6/7, which is also new and raises concern for a small metastatic focus. \par 2.Stable postsurgical changes of total gastrectomy and esophagojejunostomy and jejunojejunostomy , jejunostomy tube in place. No evidence of recurrence. No bowel obstruction. \par 3. No pulmonary nodule, focal consolidation, pleural effusion, or pneumothorax. No thoracic lymphadenopathy. \par 4. No abdominopelvic lymphadenopathy. \par \par 2/24/21 MRI Abdomen\par 1.Gastric adenocarcinoma s/p total gastrectomy with postsurgical appearance. No suspicious nodularity/enhancement at the surgical sites to suggest local recurrence. \par 2. Since the prior to CT dated 11/3/20 there has been interval increased atrophy of the left lateral segment, with  lack of uptake notes on hepatobiliary phase imaging  in a geographic distribution, suggestive of post radiation treatment changes. There is associated diminished caliber of the left portal vein and occluded left hepatic vein. Findings are most likely related to post radiation treatment changes of the left lateral segment. Infiltrative metastasis is considered much less likely. Recommend a 3 months f/u\par 3.Interval development of a new discrete 2 cm segment 6/7 hepatic lesion since the prior to CT AP dated 11/3/20 suspicious for viable hepatic metastasis. Additional indeterminate 0.3cm segment 5 hepatic lesion for which hepatic metastasis also remains on the differential diagnosis. \par \par 3/23/21\par CT guided liver biopsy\par -Adenocarcinoma, moderately-poorly differentiated \par positive for CK7, negative for CK20, CDX-2 Hepar-1\par This pattern of staining supports the diagnosis, but is not specific in regards to site of origin. Given  patient's history of gastric cancer, this morphology and immunophenotype would be compatible with a metastasis from that site.  \par \par 5/27/21 CT AP at [facility]\par 2.1 cm x 1.9 cm right hepatic mass previously 1.3x0.9cm. 1.1cm hypodense lesion right hepatic lobe not seen previously. Previously seen 8.4cmx7.2cm ill defined hypodensity throughout the left hepatic lobe is no longer seen. Imp: right hepatic mass increased in size. New small right hepatic lesion suspicious metastasis\par \par 07/13/21 MR Abdomen w/wo IV Contrast ([facility])\par Progressive atrophy of the left lobe of the liver and metastatic lesion with hypoenhancing defect on hepatobiliary phase imaging measuring approximately 4.2 x 3.2 cm, decreased in size from February CT where it measured 8.4 x 7.2 cm and from May CT where it measured approximately 4.3 x 4.1 cm. Hypoenhancing segment 6/7 lesion measuring 2.1 cm on prior CT scan measures 1.4 cm on the current exam. Segment 6 lesion is not readily seen on current study. \par No new liver metastasis are identified. MRI Findings of hepatic iron deposition. Hemochromatosis protocol by be performed as needed. Asymmetric left hemidiaphragm elevation as above. \par \par 8/9/21 MRI Abdomen\par 1. Minimal interval decreased size left hepatic lobe hypoenhancing lesion now measuring 3.9cm, previously measuring 4.2cm\par 2. Interval decreased size right hepatic lobe segment 6/7 lesion measuring 1.0 cm previously measuring 1.4cm\par 3. No significant change right hepatic lobe segment 6 lesion ,measuring 0.7cm\par 4. No signs of hepatic iron deposition. \par \par 12/1/21 CT CHEST WITHOUT CONTRAST\par 1. No intrathoracic metastatic disease\par 2. For evaluation of contents of the upper abdomen, please refer to the MRI abdomen study scheduled to be performed on the same day \par \par 12/1/21 MRI ABDOMEN WITHOUT/WITH IV CONTRAST\par 1. Status post total gastrectomy and left lateral segmentectomy as well as post chemoradiation of liver metastases, currently on immunotherapy. Evolving post radiation treatment changes in the medial left hepatic lobe since the prior study dated 8/9/2021 (without discrete lesion noted on today's exam). Continued slight decrease in size of 2 right hepatic lobe metastases which measure up to 0.9 cm in segment 7 on the current study. No new liver metastases on the current study.\par 2. 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 Post radiation treatment changes to the bone marrow. \par \par 3/1/22 CT CHEST at [facility]\par No metastatic disease\par \par 03/10/22 MR Abdomen w/wo Contrast:\par Right hepatic lobe segment 7 hypoenhancing lesion now measures 0.9 cm. Right hepatic lobe segment 6 measuring 0.4 cm, previously 0.6 cm .Stable redemonstration of post treatment changes of the liver. Stable redemonstration of segment 7 hypoenhancing lesion. Slight interval decrease of size of segment 6 non enhancing lesion. Continued surveillance is advised. Non specific low signal in the left lower lobe, likely post radiation changes. \par \par ct DNA not detected (7/6/21, 10/12/21, 3/7/22)\par \par  [de-identified] : Patient presents to clinic for f/u. On Pembro every 3 weeks. Continues to have consistent peripheral neuropathy, now on pregabalin 75mg bid. Reports loss of appetite, fatigue, and insomnia. Also reports loss of libido. Denies any pain, SOB, chest pain, N/V/C/D, skin rash, or fever. \par \par

## 2022-06-28 ENCOUNTER — LABORATORY RESULT (OUTPATIENT)
Age: 67
End: 2022-06-28

## 2022-06-28 ENCOUNTER — APPOINTMENT (OUTPATIENT)
Dept: HEMATOLOGY ONCOLOGY | Facility: CLINIC | Age: 67
End: 2022-06-28

## 2022-06-28 VITALS
DIASTOLIC BLOOD PRESSURE: 70 MMHG | RESPIRATION RATE: 18 BRPM | SYSTOLIC BLOOD PRESSURE: 106 MMHG | HEART RATE: 76 BPM | TEMPERATURE: 97.3 F | HEIGHT: 70 IN | BODY MASS INDEX: 18.9 KG/M2 | OXYGEN SATURATION: 99 % | WEIGHT: 132 LBS

## 2022-06-28 DIAGNOSIS — D64.9 ANEMIA, UNSPECIFIED: ICD-10-CM

## 2022-06-28 DIAGNOSIS — C16.9 MALIGNANT NEOPLASM OF STOMACH, UNSPECIFIED: ICD-10-CM

## 2022-06-28 DIAGNOSIS — R53.83 OTHER FATIGUE: ICD-10-CM

## 2022-06-28 DIAGNOSIS — Z13.29 ENCOUNTER FOR SCREENING FOR OTHER SUSPECTED ENDOCRINE DISORDER: ICD-10-CM

## 2022-06-28 DIAGNOSIS — C78.7 SECONDARY MALIGNANT NEOPLASM OF LIVER AND INTRAHEPATIC BILE DUCT: ICD-10-CM

## 2022-06-28 DIAGNOSIS — R97.8 OTHER ABNORMAL TUMOR MARKERS: ICD-10-CM

## 2022-06-28 DIAGNOSIS — E55.9 VITAMIN D DEFICIENCY, UNSPECIFIED: ICD-10-CM

## 2022-06-28 PROCEDURE — 99214 OFFICE O/P EST MOD 30 MIN: CPT | Mod: 25

## 2022-06-28 PROCEDURE — 36415 COLL VENOUS BLD VENIPUNCTURE: CPT

## 2022-06-28 NOTE — ASSESSMENT
[FreeTextEntry1] : Mr. Murry is 66yr M with Stage III(ypT4b,pN2,cMo AJCC 8th ed) poorly  differentiated gastric cancer of the body and antrum with MSI-H, Her2 -ve, KRAS WT, PD- L1 expression with CPS 5, s/p 8 cycles of FLOT (4/16/20-8/12/20), Total gastrectomy with esophagojejunostomy with enblock resection of segment 2/3, periportal and D2 lymphadenectomy, feeding jejunostomy, complete omentectomy,oversewing of duodenum (9/14/20), (Xeloda)RT(12/14/20). Now with recurrent dz,isolated biopsy proven liver met segment 6. Started C1 Pembrolizumab with Dr. Mendoza at [place] on 4/20/21. S/P C21 Pembro on 6/24/22.  He gets Pembro at [facility] due to proximity to his home. Tolerating Tx well, symptomatically improved and CEA has been trending down. CT Chest and MRI Abdomen on 3/1/22 at [facility], which showed continued disease regression. Now CEA slightly increasing. 6/15/22 MRI ABDOMEN reveals stable 0.3 cm segment 6 and 0.9 cm segment 7 treated liver \par metastases. Stable posttreatment changes of the liver. No new liver lesions on the current exam. 6/15/22 CT CHEST CORI. Here today for f/u\par \par 9/14/20\par surgical pathology report\par A. Omentum, Omentectomy: Benign omental fibroadipose tissue with chronic inflammation and reactive mesothelium\par B Lymph node, common bile duct , excision: One benign lymph node(0/1)\par C. Lymph node, Hepatic artery , excision: Metastatic adenocarcinoma involving three of three lymph node(3/3)\par D. Lymph node, portal vein excision: One benign lymph node(0/1)\par E. Stomach and Liber, Total gastrectomy and partial hepatectomy \par - Invasive poorly differentiated adenocarcinoma, measuring 8.0cm, predominantly involving gastric body and antrum\par -Tumor invades through entire thickness of gastric wall and into liver parenchyma. \par -All surgical margins( proximal ,distal, radial and liver resection margins) are free of tumor.\par - Focal lymphovascular space invasion present \par - No peripheral invasion seen\par -Metastatic adenocarcinoma involving one of twenty five lymph nodes(1/25)\par -Metastatic focus in lymph node measures 2.2cm\par -Background stomach shows intestinal metaplasia\par -AJCC 8th ed pathologic tumor stage: [id number]\par F.Lymph node, left gastric , excision:  One benign lymph node(0/1)\par G .Lymph node, Infrapancreatic, excision: Metastatic adenocarcinoma involving one of one lymph node(1/1) \par \par 2/3/21 CT CAP At [facility]\par 1.Interval development of left hepatic vein thrombosis as well as a large geographic region of hypodensity throughout the left hepatic lobe measuring 8.4x7.2x7.8cm. Finding could represent a perfusional anomaly related to left hepatic vein occlusion. However, a large metastatic lesion with associated left hepatic vein thrombosis is not excluded. Additionally, there is a 1.3am hypodensity in hepatic seg 6/7, which is also new and raises concern for a small metastatic focus. \par 2.Stable postsurgical changes of total gastrectomy and esophagojejunostomy and jejunojejunostomy , jejunostomy tube in place. No evidence of recurrence. No bowel obstruction. \par 3. No pulmonary nodule, focal consolidation, pleural effusion, or pneumothorax. No thoracic lymphadenopathy. \par 4. No abdominopelvic lymphadenopathy. \par \par 2/24/21 MRI Abdomen\par 1.Gastric adenocarcinoma s/p total gastrectomy with postsurgical appearance. No suspicious nodularity/enhancement at the surgical sites to suggest local recurrence. \par 2. Since the prior to CT dated 11/3/20 there has been interval increased atrophy of the left lateral segment, with  lack of uptake notes on hepatobiliary phase imaging  in a geographic distribution, suggestive of post radiation treatment changes. There is associated diminished caliber of the left portal vein and occluded left hepatic vein. Findings are most likely related to post radiation treatment changes of the left lateral segment. Infiltrative metastasis is considered much less likely. Recommend a 3 months f/u\par 3.Interval development of a new discrete 2 cm segment 6/7 hepatic lesion since the prior to CT AP dated 11/3/20 suspicious for viable hepatic metastasis. Additional indeterminate 0.3cm segment 5 hepatic lesion for which hepatic metastasis also remains on the differential diagnosis. \par \par 3/23/21\par CT guided liver biopsy\par -Adenocarcinoma, moderately-poorly differentiated \par positive for CK7, negative for CK20, CDX-2 Hepar-1\par This pattern of staining supports the diagnosis, but is not specific in regards to site of origin. Given  patient's history of gastric cancer, this morphology and immunophenotype would be compatible with a metastasis from that site.  \par \par 5/27/21 CT AP at [facility]\par 2.1 cm x 1.9 cm right hepatic mass previously 1.3x0.9cm. 1.1cm hypodense lesion right hepatic lobe not seen previously. Previously seen 8.4cmx7.2cm ill defined hypodensity throughout the left hepatic lobe is no longer seen. Imp: right hepatic mass increased in size. New small right hepatic lesion suspicious metastasis.\par \par 07/13/21 MR Abdomen w/wo IV Contrast ([facility])\par Progressive atrophy of the left lobe of the liver and metastatic lesion with hypoenhancing defect on hepatobiliary phase imaging measuring approximately 4.2 x 3.2 cm, decreased in size from February CT where it measured 8.4 x 7.2 cm and from May CT where it measured approximately 4.3 x 4.1 cm. Hypoenhancing segment 6/7 lesion measuring 2.1 cm on prior CT scan measures 1.4 cm on the current exam. Segment 6 lesion is not readily seen on current study. \par No new liver metastasis are identified. MRI Findings of hepatic iron deposition. Hemochromatosis protocol by be performed as needed. Asymmetric left hemidiaphragm elevation as above. \par \par 8/9/21 MRI Abdomen\par 1. Minimal interval decreased size left hepatic lobe hypoenhancing lesion now measuring 3.9cm, previously measuring 4.2cm\par 2. Interval decreased size right hepatic lobe segment 6/7 lesion measuring 1.0 cm previously measuring 1.4cm\par 3. No significant change right hepatic lobe segment 6 lesion ,measuring 0.7cm\par 4. No signs of hepatic iron deposition. \par \par 12/1/21 CT CHEST WITHOUT CONTRAST\par 1. No intrathoracic metastatic disease\par 2. For evaluation of contents of the upper abdomen, please refer to the MRI abdomen study scheduled to be performed on the same day \par \par 12/1/21 MRI ABDOMEN WITHOUT/WITH IV CONTRAST\par 1. Status post total gastrectomy and left lateral segmentectomy as well as post chemoradiation of liver metastases, currently on immunotherapy. Evolving post radiation treatment changes in the medial left hepatic lobe since the prior study dated 8/9/2021 (without discrete lesion noted on today's exam). Continued slight decrease in size of 2 right hepatic lobe metastases which measure up to 0.9 cm in segment 7 on the current study. No new liver metastases on the current study.\par 2. 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 Post radiation treatment changes to the bone marrow. \par \par 3/1/22 CT CHEST at [facility]\par No metastatic disease\par \par 03/10/22 MR Abdomen w/wo Contrast:\par Right hepatic lobe segment 7 hypoenhancing lesion now measures 0.9 cm. Right hepatic lobe segment 6 measuring 0.4 cm, previously 0.6 cm .Stable redemonstration of post treatment changes of the liver. Stable redemonstration of segment 7 hypoenhancing lesion. Slight interval decrease of size of segment 6 non enhancing lesion. Continued surveillance is advised. Non specific low signal in the left lower lobe, likely post radiation changes. \par \par ct DNA not detected (7/6/21, 10/12/21, 3/7/22)\par \par 6/15/22 MRI ABDOMEN WITHOUT/WITH IV CONTRAST at [facility]\par Stable 0.3 cm segment 6 and 0.9 cm segment 7 treated liver metastases. Stable posttreatment changes of the liver. No new liver lesions on the current exam.\par T11-L3 vertebral body post radiation induced fatty marrow changes.\par \par 6/15/22 CT CHEST WITHOUT IV CONTRAST at [facility]\par No evidence of metastatic disease in the thorax.\par \par Plan\par # Stage III(ypT4b,pN2,cMo AJCC 8th ed) MSI-H, Her2 -ve, KRAS-ve, PD- L1 expression CPS 5  Gastric cancer, now with recurrent dz with liver mets\par -s/p C8 FLOT, Total gastrectomy with esophagojejunostomy, Xeloda/RT completed 12/20. Noted to have local and hep recurrence\par -Started C1 Pembrolizumab with Dr. Mendoza at [place] on 4/20/21. CT AP on 5/27 with mixed response. MR Abdomen/Pelvis on 8/9/21 showing decrease in size of liver mets.  MRI on 12/1/21 showing continued slightly decrease in size of liver w/o new disease. Status post total gastrectomy and left lateral segmentectomy as well as post chemoradiation of liver metastases, currently on immunotherapy. Evolving post radiation treatment changes in the medial left hepatic lobe since the prior study dated 8/9/2021 (without discrete lesion noted on today's exam). Continued slight decrease in size of 2 right hepatic lobe metastases which measure up to 0.9 cm in segment 7 on the current study. No new liver metastases on the current study.\par - Received C21 Pembro on 6/24/22 ct Pembro  C22 will be on 7/15/22\par - Saw surgeon at [facility] and not candidate for surgery  at this time. At that time, recommended continued treatment for 2- 3 months and reevaluate. . \par - SW f/u\par -CEA 6.1 (4/26/22), 6.2 (3/31/22), 5.1 (3/8/22), 7.1 (1/27/22), 6.5 (12/15/21), 4.8(11/10/21),5.7(10/12/21), 6.1(9/14/21),  7 ( 8/25/21),  42.3 (06/14/21) and 253 (05/05/21). \par -ct DNA not detected (7/6/21, 10/12/21, 03/07/22) Will repeat today\par -Check CBC, CMP, CEA, Iron/TIBC, Ferritin, B12, Vit D,and  TSH\par -Repeated CT Chest and MRI Abdomen on 3/1/22 at [facility], which showed continued disease regression. Will suggest evaluation with Surgical Oncology for possible metastasectomy; would favor R0 resection over continued immunotherapy. 6/15/22 MRI ABDOMEN reveals stable 0.3 cm segment 6 and 0.9 cm segment 7 treated liver \par metastases. Stable posttreatment changes of the liver. No new liver lesions on the current exam. 6/15/22 CT CHEST CORI.\par -ct IO vs SBRT\par \par # Increased fatigue\par -Checked ACTH and cortisol level at [facility] \par -TSH nl\par \par # Peripheral neuropathy- stable\par - Continue Lyrica 75mg tid  as per Dr. Tavarez. \par \par Plan discussed with Dr. James IBRAHIM in 2 months

## 2022-06-28 NOTE — HISTORY OF PRESENT ILLNESS
[de-identified] : Mr. Murry is 66yr M with Stage III(ypT4b,pN2,cMo AJCC 8th ed) poorly  differentiated gastric cancer of the body and antrum with MSI-H, Her2 -ve, KRAS WT, PD- L1 expression with CPS 5, s/p 8 cycles of FLOT (4/16/20-8/12/20), Total gastrectomy with esophagojejunostomy with enblock resection of segment 2/3, periportal and D2 lymphadenectomy, feeding jejunostomy, complete omentectomy,oversewing of duodenum (9/14/20), (Xeloda)RT(12/14/20). Now with recurrent dz,isolated biopsy proven liver met segment 6. Started C1 Pembrolizumab with Dr. Mendoza at [place] on 4/20/21. He went to the ED at [facility] for pain and leakage from PEG tube on 5/27 and repeated CT AP with mixed response and PEG tube was removed after that. s/p C21 Pembro on 6/24/22. He gets Pembro at [facility] due to proximity to his home. Tolerating Tx well, symptomatically improved and CEA has been trending down. MR Abdomen (8/9/21) showing decreased in left and right lobe mets.Continued slight decrease in size of 2 right hepatic lobe metastases which measure up to 0.9 cm in segment 7 on the current study. No new liver metastases on MRI dated in December 2021. On 6/15/22 MRI ABDOMEN reveals stable 0.3 cm segment 6 and 0.9 cm segment 7 treated liver metastases. Stable posttreatment changes of the liver. No new liver lesions on the current exam. On 6/15/22 CT CHEST CORI.Here today for f/u\par \par He was diagnosed with  Stage III(ypT4b,pN2,cMo) poorly  differentiated gastric cancer of the body and antrum, Her2 negative. He completed 8 cycles of FLOT chemotherapy 4/16/20-8/12/20  then had total gastrectomy with esophagojejunostomy with enblock resection of segment 2/3, periportal and D2 lymphadenectomy, feeding jejunostomy, complete omentectomy,oversewing of duodenum on 9/14/20 and completed post-op chemo(Xeloda)RT 12/14/20 at [facility]. Surveillance CT AP on 2/3/21 showed interval development of left hepatic vein thrombosis as well as a large geographic region of hypodensity throughout the left hepatic lobe measuring 8.4x7.2x7.8 cm . He underwent liver biopsy on 3/23/21, path confirmed moderately differentiated adenocarcinoma, positive for CK7, negative for CK20, CDX-2 , Hepar-1.  His oncologist at [facility] is suggesting he has Immunotherapy. He has sought second opinion from [facility] and is now here for a third opinion. He is reluctant to offer in depth information regarding Phx.\par \par 9/14/20\par surgical pathology report\par A. Omentum, Omentectomy: Benign omental fibroadipose tissue with chronic inflammation and reactive mesothelium\par B Lymph node, common bile duct , excision: One benign lymph node(0/1)\par C. Lymph node, Hepatic artery , excision: Metastatic adenocarcinoma involving three of three lymph node(3/3)\par D. Lymph node, portal vein excision: One benign lymph node(0/1)\par E. Stomach and Liber, Total gastrectomy and partial hepatectomy \par - Invasive poorly differentiated adenocarcinoma, measuring 8.0cm, predominantly involving gastric body and antrum\par -Tumor invades through entire thickness of gastric wall and into liver parenchyma. \par -All surgical margins( proximal ,distal, radial and liver resection margins) are free of tumor.\par - Focal lymphovascular space invasion present \par - No peripheral invasion seen\par -Metastatic adenocarcinoma involving one of twenty five lymph nodes(1/25)\par -Metastatic focus in lymph node measures 2.2cm\par -Background stomach shows intestinal metaplasia\par -AJCC 8th ed pathologic tumor stage: [id number]\par F.Lymph node, left gastric , excision:  One benign lymph node(0/1)\par G .Lymph node, Infrapancreatic, excision: Metastatic adenocarcinoma involving one of one lymph node(1/1) \par \par 2/3/21 CT CAP At [facility]\par 1.Interval development of left hepatic vein thrombosis as well as a large geographic region of hypodensity throughout the left hepatic lobe measuring 8.4x7.2x7.8cm. Finding could represent a perfusional anomaly related to left hepatic vein occlusion. However, a large metastatic lesion with associated left hepatic vein thrombosis is not excluded. Additionally, there is a 1.3am hypodensity in hepatic seg 6/7, which is also new and raises concern for a small metastatic focus. \par 2.Stable postsurgical changes of total gastrectomy and esophagojejunostomy and jejunojejunostomy , jejunostomy tube in place. No evidence of recurrence. No bowel obstruction. \par 3. No pulmonary nodule, focal consolidation, pleural effusion, or pneumothorax. No thoracic lymphadenopathy. \par 4. No abdominopelvic lymphadenopathy. \par \par 2/24/21 MRI Abdomen\par 1.Gastric adenocarcinoma s/p total gastrectomy with postsurgical appearance. No suspicious nodularity/enhancement at the surgical sites to suggest local recurrence. \par 2. Since the prior to CT dated 11/3/20 there has been interval increased atrophy of the left lateral segment, with  lack of uptake notes on hepatobiliary phase imaging  in a geographic distribution, suggestive of post radiation treatment changes. There is associated diminished caliber of the left portal vein and occluded left hepatic vein. Findings are most likely related to post radiation treatment changes of the left lateral segment. Infiltrative metastasis is considered much less likely. Recommend a 3 months f/u\par 3.Interval development of a new discrete 2 cm segment 6/7 hepatic lesion since the prior to CT AP dated 11/3/20 suspicious for viable hepatic metastasis. Additional indeterminate 0.3cm segment 5 hepatic lesion for which hepatic metastasis also remains on the differential diagnosis. \par \par 3/23/21\par CT guided liver biopsy\par -Adenocarcinoma, moderately-poorly differentiated \par positive for CK7, negative for CK20, CDX-2 Hepar-1\par This pattern of staining supports the diagnosis, but is not specific in regards to site of origin. Given  patient's history of gastric cancer, this morphology and immunophenotype would be compatible with a metastasis from that site.  \par \par 5/27/21 CT AP at [facility]\par 2.1 cm x 1.9 cm right hepatic mass previously 1.3x0.9cm. 1.1cm hypodense lesion right hepatic lobe not seen previously. Previously seen 8.4cmx7.2cm ill defined hypodensity throughout the left hepatic lobe is no longer seen. Imp: right hepatic mass increased in size. New small right hepatic lesion suspicious metastasis\par \par 07/13/21 MR Abdomen w/wo IV Contrast ([facility])\par Progressive atrophy of the left lobe of the liver and metastatic lesion with hypoenhancing defect on hepatobiliary phase imaging measuring approximately 4.2 x 3.2 cm, decreased in size from February CT where it measured 8.4 x 7.2 cm and from May CT where it measured approximately 4.3 x 4.1 cm. Hypoenhancing segment 6/7 lesion measuring 2.1 cm on prior CT scan measures 1.4 cm on the current exam. Segment 6 lesion is not readily seen on current study. \par No new liver metastasis are identified. MRI Findings of hepatic iron deposition. Hemochromatosis protocol by be performed as needed. Asymmetric left hemidiaphragm elevation as above. \par \par 8/9/21 MRI Abdomen\par 1. Minimal interval decreased size left hepatic lobe hypoenhancing lesion now measuring 3.9cm, previously measuring 4.2cm\par 2. Interval decreased size right hepatic lobe segment 6/7 lesion measuring 1.0 cm previously measuring 1.4cm\par 3. No significant change right hepatic lobe segment 6 lesion ,measuring 0.7cm\par 4. No signs of hepatic iron deposition. \par \par 12/1/21 CT CHEST WITHOUT CONTRAST\par 1. No intrathoracic metastatic disease\par 2. For evaluation of contents of the upper abdomen, please refer to the MRI abdomen study scheduled to be performed on the same day \par \par 12/1/21 MRI ABDOMEN WITHOUT/WITH IV CONTRAST\par 1. Status post total gastrectomy and left lateral segmentectomy as well as post chemoradiation of liver metastases, currently on immunotherapy. Evolving post radiation treatment changes in the medial left hepatic lobe since the prior study dated 8/9/2021 (without discrete lesion noted on today's exam). Continued slight decrease in size of 2 right hepatic lobe metastases which measure up to 0.9 cm in segment 7 on the current study. No new liver metastases on the current study.\par 2. 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 Post radiation treatment changes to the bone marrow. \par \par 3/1/22 CT CHEST at [facility]\par No metastatic disease\par \par 03/10/22 MR Abdomen w/wo Contrast:\par Right hepatic lobe segment 7 hypoenhancing lesion now measures 0.9 cm. Right hepatic lobe segment 6 measuring 0.4 cm, previously 0.6 cm .Stable redemonstration of post treatment changes of the liver. Stable redemonstration of segment 7 hypoenhancing lesion. Slight interval decrease of size of segment 6 non enhancing lesion. Continued surveillance is advised. Non specific low signal in the left lower lobe, likely post radiation changes. \par \par 6/15/22 MRI ABDOMEN WITHOUT/WITH IV CONTRAST at [facility]\par Stable 0.3 cm segment 6 and 0.9 cm segment 7 treated liver metastases. Stable posttreatment changes of the liver. No new liver lesions on the current exam.\par T11-L3 vertebral body post radiation induced fatty marrow changes.\par \par 6/15/22 CT CHEST WITHOUT IV CONTRAST at [facility]\par No evidence of metastatic disease in the thorax.\par \par ct DNA not detected (7/6/21, 10/12/21, 3/7/22) [de-identified] : Patient presents to clinic for f/u. On Pembro every 3 weeks. Continues to have consistent peripheral neuropathy, loss of appetite, and insomnia. Denies any pain, SOB, chest pain, N/V/C/D, skin rash, or fever. \par \par

## 2022-06-28 NOTE — REVIEW OF SYSTEMS
[Fatigue] : fatigue [Insomnia] : insomnia [Negative] : Allergic/Immunologic [Fever] : no fever [Chills] : no chills [Night Sweats] : no night sweats [Recent Change In Weight] : ~T no recent weight change [Eye Pain] : no eye pain [Dysphagia] : no dysphagia [Odynophagia] : no odynophagia [Chest Pain] : no chest pain [Lower Ext Edema] : no lower extremity edema [Shortness Of Breath] : no shortness of breath [Wheezing] : no wheezing [Cough] : no cough [Abdominal Pain] : no abdominal pain [Vomiting] : no vomiting [Constipation] : no constipation [Diarrhea] : no diarrhea [Dysuria] : no dysuria [Muscle Weakness] : no muscle weakness [Confused] : no confusion [Dizziness] : no dizziness [Fainting] : no fainting [Difficulty Walking] : no difficulty walking [FreeTextEntry2] : loss of appetite  [de-identified] : chronic tingling to fingers/toes

## 2022-06-29 LAB
25(OH)D3 SERPL-MCNC: 28 NG/ML
CEA SERPL-MCNC: 6.2 NG/ML
FOLATE SERPL-MCNC: 12.8 NG/ML
TSH SERPL-ACNC: 0.84 UIU/ML
VIT B12 SERPL-MCNC: 380 PG/ML

## 2022-07-01 ENCOUNTER — NON-APPOINTMENT (OUTPATIENT)
Age: 67
End: 2022-07-01

## 2023-06-05 ENCOUNTER — APPOINTMENT (OUTPATIENT)
Dept: NEUROLOGY | Facility: CLINIC | Age: 68
End: 2023-06-05
Payer: MEDICAID

## 2023-06-05 VITALS
SYSTOLIC BLOOD PRESSURE: 91 MMHG | HEIGHT: 70 IN | HEART RATE: 73 BPM | BODY MASS INDEX: 18.32 KG/M2 | DIASTOLIC BLOOD PRESSURE: 53 MMHG | WEIGHT: 128 LBS | TEMPERATURE: 97.7 F | OXYGEN SATURATION: 97 % | RESPIRATION RATE: 16 BRPM

## 2023-06-05 DIAGNOSIS — T45.1X5A DRUG-INDUCED POLYNEUROPATHY: ICD-10-CM

## 2023-06-05 DIAGNOSIS — G62.0 DRUG-INDUCED POLYNEUROPATHY: ICD-10-CM

## 2023-06-05 PROCEDURE — 99213 OFFICE O/P EST LOW 20 MIN: CPT

## 2023-06-05 RX ORDER — SILDENAFIL 100 MG/1
100 TABLET, FILM COATED ORAL
Qty: 12 | Refills: 0 | Status: ACTIVE | COMMUNITY
Start: 2023-04-10

## 2023-06-05 RX ORDER — BENZONATATE 200 MG/1
200 CAPSULE ORAL
Qty: 20 | Refills: 0 | Status: ACTIVE | COMMUNITY
Start: 2023-05-10

## 2023-06-05 RX ORDER — ZOLPIDEM TARTRATE 10 MG/1
10 TABLET ORAL
Qty: 30 | Refills: 0 | Status: ACTIVE | COMMUNITY
Start: 2023-06-02

## 2023-06-05 RX ORDER — KETOCONAZOLE 20 MG/G
2 CREAM TOPICAL
Qty: 60 | Refills: 0 | Status: ACTIVE | COMMUNITY
Start: 2023-04-26

## 2023-06-05 RX ORDER — HYDROCORTISONE 1 %
12 CREAM (GRAM) TOPICAL
Qty: 396 | Refills: 0 | Status: ACTIVE | COMMUNITY
Start: 2023-05-24

## 2023-06-05 RX ORDER — ETODOLAC 200 MG/1
200 CAPSULE ORAL
Qty: 219 | Refills: 0 | Status: ACTIVE | COMMUNITY
Start: 2023-05-14

## 2023-06-05 RX ORDER — LIDOCAINE 5% 700 MG/1
5 PATCH TOPICAL
Qty: 30 | Refills: 0 | Status: ACTIVE | COMMUNITY
Start: 2023-05-04

## 2023-06-05 RX ORDER — ERGOCALCIFEROL 1.25 MG/1
50000 CAPSULE ORAL
Qty: 4 | Refills: 0 | Status: ACTIVE | COMMUNITY
Start: 2022-11-07

## 2023-06-05 RX ORDER — NAPROXEN SODIUM 275 MG/1
275 TABLET ORAL
Qty: 147 | Refills: 0 | Status: ACTIVE | COMMUNITY
Start: 2023-05-14

## 2023-06-05 RX ORDER — DOXEPIN HYDROCHLORIDE 50 MG/1
50 CAPSULE ORAL
Qty: 146 | Refills: 0 | Status: ACTIVE | COMMUNITY
Start: 2023-05-14

## 2023-06-05 RX ORDER — PREGABALIN 50 MG/1
50 CAPSULE ORAL
Qty: 60 | Refills: 5 | Status: DISCONTINUED | COMMUNITY
Start: 2021-12-08 | End: 2023-06-05

## 2023-06-05 NOTE — HISTORY OF PRESENT ILLNESS
[FreeTextEntry1] : Neuropathy stable overall\par Taking lyrica 75mg TID which helps somewhat but sometimes pain breaks through\par He states that gastric cancer is improving and may be in remission. He follows at [facility]. \par \par Reviewed: \par last note from heme/onc \par labs below

## 2023-06-05 NOTE — PHYSICAL EXAM
[FreeTextEntry1] : Motor: 5/5 strength in LE \par Sensory: vibration absent at toes, moderately reduced at ankles b/l; JPS mildly impaired R big toe, normal L big toe \par Reflexes: 1+ patellar, absent achilles b/l

## 2023-06-05 NOTE — ASSESSMENT
[FreeTextEntry1] : Exam is actually improving although he reports symptoms are stable\par Continue lyrica 75mg TID\par f/u 1 year

## 2023-09-20 ENCOUNTER — APPOINTMENT (OUTPATIENT)
Dept: PULMONOLOGY | Facility: CLINIC | Age: 68
End: 2023-09-20
Payer: MEDICARE

## 2023-09-20 ENCOUNTER — OUTPATIENT (OUTPATIENT)
Dept: OUTPATIENT SERVICES | Facility: HOSPITAL | Age: 68
LOS: 1 days | End: 2023-09-20
Payer: MEDICAID

## 2023-09-20 VITALS
HEART RATE: 68 BPM | HEIGHT: 70 IN | DIASTOLIC BLOOD PRESSURE: 80 MMHG | WEIGHT: 126 LBS | BODY MASS INDEX: 18.04 KG/M2 | SYSTOLIC BLOOD PRESSURE: 120 MMHG | OXYGEN SATURATION: 97 % | TEMPERATURE: 98.2 F

## 2023-09-20 DIAGNOSIS — R05.3 CHRONIC COUGH: ICD-10-CM

## 2023-09-20 PROCEDURE — 99204 OFFICE O/P NEW MOD 45 MIN: CPT | Mod: 25

## 2023-09-20 PROCEDURE — 71046 X-RAY EXAM CHEST 2 VIEWS: CPT | Mod: 26

## 2023-09-20 PROCEDURE — 94060 EVALUATION OF WHEEZING: CPT

## 2023-09-20 PROCEDURE — 71046 X-RAY EXAM CHEST 2 VIEWS: CPT

## 2023-09-20 PROCEDURE — 94729 DIFFUSING CAPACITY: CPT

## 2023-09-20 PROCEDURE — 94727 GAS DIL/WSHOT DETER LNG VOL: CPT

## 2023-11-13 ENCOUNTER — RX RENEWAL (OUTPATIENT)
Age: 68
End: 2023-11-13

## 2023-11-13 RX ORDER — PREGABALIN 75 MG/1
75 CAPSULE ORAL
Qty: 270 | Refills: 1 | Status: ACTIVE | COMMUNITY
Start: 2023-04-26 | End: 1900-01-01

## 2023-12-08 ENCOUNTER — APPOINTMENT (OUTPATIENT)
Dept: PULMONOLOGY | Facility: CLINIC | Age: 68
End: 2023-12-08
Payer: MEDICARE

## 2023-12-08 VITALS
HEART RATE: 66 BPM | OXYGEN SATURATION: 97 % | DIASTOLIC BLOOD PRESSURE: 78 MMHG | TEMPERATURE: 97.1 F | SYSTOLIC BLOOD PRESSURE: 121 MMHG | HEIGHT: 70 IN | WEIGHT: 125 LBS | BODY MASS INDEX: 17.9 KG/M2

## 2023-12-08 PROCEDURE — 99213 OFFICE O/P EST LOW 20 MIN: CPT | Mod: 25

## 2023-12-08 RX ORDER — BENZONATATE 200 MG/1
200 CAPSULE ORAL 3 TIMES DAILY
Qty: 90 | Refills: 1 | Status: ACTIVE | COMMUNITY
Start: 2023-12-08 | End: 1900-01-01

## 2024-06-03 ENCOUNTER — APPOINTMENT (OUTPATIENT)
Dept: NEUROLOGY | Facility: CLINIC | Age: 69
End: 2024-06-03

## 2025-02-02 NOTE — ASSESSMENT
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  Heart sounds S1, S2.  No murmurs, rubs or gallops.